# Patient Record
Sex: FEMALE | Race: BLACK OR AFRICAN AMERICAN | NOT HISPANIC OR LATINO | Employment: FULL TIME | URBAN - METROPOLITAN AREA
[De-identification: names, ages, dates, MRNs, and addresses within clinical notes are randomized per-mention and may not be internally consistent; named-entity substitution may affect disease eponyms.]

---

## 2017-05-14 ENCOUNTER — HOSPITAL ENCOUNTER (EMERGENCY)
Facility: HOSPITAL | Age: 27
Discharge: HOME | End: 2017-05-14
Attending: EMERGENCY MEDICINE

## 2017-05-14 VITALS
WEIGHT: 160 LBS | TEMPERATURE: 98 F | SYSTOLIC BLOOD PRESSURE: 128 MMHG | OXYGEN SATURATION: 98 % | BODY MASS INDEX: 29.44 KG/M2 | HEIGHT: 62 IN | HEART RATE: 99 BPM | RESPIRATION RATE: 20 BRPM | DIASTOLIC BLOOD PRESSURE: 70 MMHG

## 2017-05-14 DIAGNOSIS — J45.901 ASTHMA WITH ACUTE EXACERBATION, UNSPECIFIED ASTHMA SEVERITY: Primary | ICD-10-CM

## 2017-05-14 PROCEDURE — 94640 AIRWAY INHALATION TREATMENT: CPT

## 2017-05-14 PROCEDURE — 99283 EMERGENCY DEPT VISIT LOW MDM: CPT | Mod: 25

## 2017-05-14 PROCEDURE — 96372 THER/PROPH/DIAG INJ SC/IM: CPT

## 2017-05-14 PROCEDURE — 25000242 PHARM REV CODE 250 ALT 637 W/ HCPCS: Performed by: EMERGENCY MEDICINE

## 2017-05-14 PROCEDURE — 63600175 PHARM REV CODE 636 W HCPCS: Performed by: EMERGENCY MEDICINE

## 2017-05-14 RX ORDER — IPRATROPIUM BROMIDE AND ALBUTEROL SULFATE 2.5; .5 MG/3ML; MG/3ML
3 SOLUTION RESPIRATORY (INHALATION)
Status: COMPLETED | OUTPATIENT
Start: 2017-05-14 | End: 2017-05-14

## 2017-05-14 RX ORDER — ALBUTEROL SULFATE 90 UG/1
1-2 AEROSOL, METERED RESPIRATORY (INHALATION) EVERY 6 HOURS PRN
Qty: 1 INHALER | Refills: 0 | Status: SHIPPED | OUTPATIENT
Start: 2017-05-14 | End: 2023-04-12

## 2017-05-14 RX ORDER — PREDNISONE 20 MG/1
60 TABLET ORAL
Status: COMPLETED | OUTPATIENT
Start: 2017-05-14 | End: 2017-05-14

## 2017-05-14 RX ORDER — KETOROLAC TROMETHAMINE 30 MG/ML
30 INJECTION, SOLUTION INTRAMUSCULAR; INTRAVENOUS
Status: COMPLETED | OUTPATIENT
Start: 2017-05-14 | End: 2017-05-14

## 2017-05-14 RX ORDER — PREDNISONE 10 MG/1
10 TABLET ORAL DAILY
Qty: 21 TABLET | Refills: 0 | Status: SHIPPED | OUTPATIENT
Start: 2017-05-14 | End: 2017-05-24

## 2017-05-14 RX ADMIN — PREDNISONE 60 MG: 20 TABLET ORAL at 02:05

## 2017-05-14 RX ADMIN — IPRATROPIUM BROMIDE AND ALBUTEROL SULFATE 3 ML: .5; 3 SOLUTION RESPIRATORY (INHALATION) at 02:05

## 2017-05-14 RX ADMIN — KETOROLAC TROMETHAMINE 30 MG: 30 INJECTION, SOLUTION INTRAMUSCULAR at 02:05

## 2017-05-14 NOTE — ED AVS SNAPSHOT
[facility] -   [id number] [place] [id number]               Alisha Cardenas   2017  2:18 AM   ED    Description:  Female : 1990   Department:  [facility]           Your Care was Coordinated By:     Provider Role From To    Gurvinder Chavira MD Attending Provider 17 0220 --      Reason for Visit     Asthma           Diagnoses this Visit        Comments    Asthma with acute exacerbation, unspecified asthma severity    -  Primary       ED Disposition     None           To Do List           Follow-up Information     Follow up with [facility] In 2 days.    Specialty:  Internal Medicine    Contact information:    [address] [facility] [address] [phone]    Additional information:    (off [address]) 1st floor        Follow up with [facility].    Specialty:  Emergency Medicine    Why:  If symptoms worsen    Contact information:    [address] [facility] [id number] [phone]       These Medications        Disp Refills Start End    predniSONE (DELTASONE) 10 MG tablet 21 tablet 0 2017    Take 1 tablet (10 mg total) by mouth once daily. Take 4 tabs x 3 days, then  Take 2 tabs x 3 days, then   Take 1 tab x 3 days. - Oral    Pharmacy: [facility] [id number] [address] & [facility] Ph #: [phone]       albuterol 90 mcg/actuation inhaler 1 Inhaler 0 2017    Inhale 1-2 puffs into the lungs every 6 (six) hours as needed for Wheezing. Rescue - Inhalation    Pharmacy: [facility] [id number] [facility] [address] [facility] & [facility] Ph #: [phone]         [facility]     [facility] Care [facility] -  Assistance  Unless otherwise directed by your provider, please contact [facility], our nurse  care line that is available for 24/7 assistance.     Registered nurses in the [facility] provide: appointment scheduling, clinical advisement, health education, and other advisory services.  Call: [phone] (toll free)               Medications           Message regarding Medications     Verify the changes and/or additions to your medication regime listed below are the same as discussed with your clinician today.  If any of these changes or additions are incorrect, please notify your healthcare provider.        START taking these NEW medications        Refills    predniSONE (DELTASONE) 10 MG tablet 0    Sig: Take 1 tablet (10 mg total) by mouth once daily. Take 4 tabs x 3 days, then  Take 2 tabs x 3 days, then   Take 1 tab x 3 days.    Class: Print    Route: Oral    albuterol 90 mcg/actuation inhaler 0    Sig: Inhale 1-2 puffs into the lungs every 6 (six) hours as needed for Wheezing. Rescue    Class: Print    Route: Inhalation      These medications were administered today        Dose Freq    albuterol-ipratropium 2.5mg-0.5mg/3mL nebulizer solution 3 mL 3 mL Every 5 min    Sig: Take 3 mLs by nebulization every 5 (five) minutes.    Class: Normal    Route: Nebulization    predniSONE tablet 60 mg 60 mg ED 1 Time    Sig: Take 3 tablets (60 mg total) by mouth ED 1 Time.    Class: Normal    Route: Oral    ketorolac injection 30 mg 30 mg ED 1 Time    Sig: Inject 30 mg into the muscle ED 1 Time.    Class: Normal    Route: Intramuscular    Non-formulary Exception Code: Defer to pharmacy           Verify that the below list of medications is an accurate representation of the medications you are currently taking.  If none reported, the list may be blank. If incorrect, please contact your healthcare provider. Carry this list with you in case of emergency.           Current Medications     albuterol 90 mcg/actuation inhaler Inhale 1-2 puffs into the lungs every 6 (six) hours as needed for Wheezing. Rescue     "ibuprofen (ADVIL,MOTRIN) 800 MG tablet Take 1 tablet (800 mg total) by mouth 3 (three) times daily.    norgestimate-ethinyl estradiol (ORTHO TRI-CYCLEN,TRI-SPRINTEC) 0.18/0.215/0.25 mg-35 mcg (28) tablet Take 1 tablet by mouth once daily.    predniSONE (DELTASONE) 10 MG tablet Take 1 tablet (10 mg total) by mouth once daily. Take 4 tabs x 3 days, then  Take 2 tabs x 3 days, then   Take 1 tab x 3 days.    sumatriptan (IMITREX) 100 MG tablet Take 1 tablet (100 mg total) by mouth daily as needed for Migraine.           Clinical Reference Information           Your Vitals Were     BP Pulse Temp Resp Height Weight    135/71 (BP Location: Right arm, Patient Position: Sitting) 101 97.8 °F (36.6 °C) (Oral) 18 5' 2" (1.575 m) 72.6 kg (160 lb)    SpO2 BMI             100% 29.26 kg/m2         Allergies as of 5/14/2017        Reactions    Shellfish Containing Products       Immunizations Administered on Date of Encounter - 5/14/2017     None      ED Micro, Lab, POCT     None      ED Imaging Orders     None        Discharge Instructions         Asthma (Adult)  Asthma is a disease where the medium and  small air passages within the lung go into spasm and restrict the flow of air. Inflammation and swelling of the airways cause further restriction. During an acute asthma attack, these factors cause difficulty breathing, wheezing, cough and chest tightness.    An asthma attack can be triggered by many things. Common triggers include infections such as the common cold, bronchitis, pneumonia. Irritants such as smoke or pollutants in the air, emotional upset, and exercise can also trigger an attack. In many adults with asthma, allergies to dust, mold, pollen and animal dander can cause an asthma attack. Skipping doses of daily asthma medicine can also bring on an asthma attack.  Asthma can be controlled using the proper medicines prescribed by your healthcare provider and avoiding exposure to known triggers including allergens and " "irritants.  Home care  · Take prescribed medicine exactly at the times advised. If you need medicine such as from a hand held inhaler or aerosol breathing machine more than every 4 hours, contact your healthcare provider or seek immediate medical attention. If prescribed an antibiotic or prednisone, take all of the medicine as prescribed, even if you are feeling better after a few days.  · Do not smoke. Avoid being exposed to the smoke of others.  · Some people with asthma have worsening of their symptoms when they take aspirin and non-steroidal or fever-reducing medicines like ibuprofen and naproxen. Talk to your healthcare provider if you think this may apply to you.  Follow-up care  Follow up with your healthcare provider, or as advised. Always bring all of your current medicines to any appointments with your healthcare provider. Also bring a complete list of medications even those not taken for asthma. If you do not already have one, talk to your healthcare provider about developing a personalized "Asthma Action Plan."  A pneumococcal (pneumonia) vaccine and yearly flu shot (every fall) are recommended. Ask your doctor about this.  When to seek medical advice  Call your healthcare provider right away if any of these occur:   · Increased wheezing or shortness of breath  · Need to use your inhalers more often than usual without relief  · Fever of 100.4ºF (38ºC) or higher, or as directed by your healthcare provider  · Coughing up lots of dark-colored or bloody sputum (mucus)  · Chest pain with each breath  · If you use a peak flow meter as part of an Asthma Action Plan, and you are still in the yellow zone (50% to 80%) 15 minutes after using inhaler medicine.  Call 911  Call 911 if any of the following occur  · Trouble walking or talking because of shortness of breath  · If you use a peak flow meter as part of an Asthma Action Plan and you are still in the red zone (less than 50%) 15 minutes after using inhaler " medicine  · Lips or fingernails turning gray or blue  Date Last Reviewed: 12/2/2015  © 8751-5420 [employer]. [address] [place] [address], [place] [address]. All rights reserved. This information is not intended as a substitute for professional medical care. Always follow your healthcare professional's instructions.          Your Scheduled Appointments     Nov 13, 2017  1:00 PM CST   Established Patient Visit with MD [facility] ([facility])    [id number] [place] [id number] [phone]              [facility] Sign-Up     Activating your [facility] account is as easy as 1-2-3!     1) Visit [email or web address], select Sign Up Now, enter this activation code and your date of birth, then select Next.  [email or web address]  Expires: 6/28/2017  3:20 AM      2) Create a username and password to use when you visit [facility] in the future and select a security question in case you lose your password and select Next.    3) Enter your e-mail address and click Sign Up!    Additional Information  If you have questions, please e-mail [email or web address] or call [phone] to talk to our [facility] staff. Remember, [facility] is NOT to be used for urgent needs. For medical emergencies, dial 911.          [facility] complies with applicable Federal civil rights laws and does not discriminate on the basis of race, color, national origin, age, disability, or sex.        Language Assistance Services     ATTENTION: Language assistance services are available, free of charge. Please call [phone].      ATENCIÓN: Si habla español, tiene a morales disposición servicios gratuitos de asistencia lingüística. Llame al [phone].     CHÚ Ý: N?u b?n nói Ti?ng Vi?t, có các d?ch v? h? tr? ngôn ng? mi?n phí dành cho b?n. G?i s? [phone].

## 2017-05-14 NOTE — DISCHARGE INSTRUCTIONS
"  Asthma (Adult)  Asthma is a disease where the medium and  small air passages within the lung go into spasm and restrict the flow of air. Inflammation and swelling of the airways cause further restriction. During an acute asthma attack, these factors cause difficulty breathing, wheezing, cough and chest tightness.    An asthma attack can be triggered by many things. Common triggers include infections such as the common cold, bronchitis, pneumonia. Irritants such as smoke or pollutants in the air, emotional upset, and exercise can also trigger an attack. In many adults with asthma, allergies to dust, mold, pollen and animal dander can cause an asthma attack. Skipping doses of daily asthma medicine can also bring on an asthma attack.  Asthma can be controlled using the proper medicines prescribed by your healthcare provider and avoiding exposure to known triggers including allergens and irritants.  Home care  · Take prescribed medicine exactly at the times advised. If you need medicine such as from a hand held inhaler or aerosol breathing machine more than every 4 hours, contact your healthcare provider or seek immediate medical attention. If prescribed an antibiotic or prednisone, take all of the medicine as prescribed, even if you are feeling better after a few days.  · Do not smoke. Avoid being exposed to the smoke of others.  · Some people with asthma have worsening of their symptoms when they take aspirin and non-steroidal or fever-reducing medicines like ibuprofen and naproxen. Talk to your healthcare provider if you think this may apply to you.  Follow-up care  Follow up with your healthcare provider, or as advised. Always bring all of your current medicines to any appointments with your healthcare provider. Also bring a complete list of medications even those not taken for asthma. If you do not already have one, talk to your healthcare provider about developing a personalized "Asthma Action Plan."  A " pneumococcal (pneumonia) vaccine and yearly flu shot (every fall) are recommended. Ask your doctor about this.  When to seek medical advice  Call your healthcare provider right away if any of these occur:   · Increased wheezing or shortness of breath  · Need to use your inhalers more often than usual without relief  · Fever of 100.4ºF (38ºC) or higher, or as directed by your healthcare provider  · Coughing up lots of dark-colored or bloody sputum (mucus)  · Chest pain with each breath  · If you use a peak flow meter as part of an Asthma Action Plan, and you are still in the yellow zone (50% to 80%) 15 minutes after using inhaler medicine.  Call 911  Call 911 if any of the following occur  · Trouble walking or talking because of shortness of breath  · If you use a peak flow meter as part of an Asthma Action Plan and you are still in the red zone (less than 50%) 15 minutes after using inhaler medicine  · Lips or fingernails turning gray or blue  Date Last Reviewed: 12/2/2015  © 6383-1705 [employer]. [address] [place] [address], [place] [address]. All rights reserved. This information is not intended as a substitute for professional medical care. Always follow your healthcare professional's instructions.

## 2017-05-14 NOTE — ED PROVIDER NOTES
SCRIBE #1 NOTE: I, Cayla Rae, am scribing for, and in the presence of, Gurvinder Chavira MD. I have scribed the entire note.      History      Chief Complaint   Patient presents with    Asthma     cough and chest wall pain       Review of patient's allergies indicates:   Allergen Reactions    Shellfish containing products         HPI   HPI    5/14/2017, 2:26 AM   History obtained from the patient      History of Present Illness: Alisha Cardenas is a 26 y.o. female patient with PMHx of asthma who presents to the Emergency Department for SOB which onset gradually tonight. Symptoms are constant and moderate in severity. No mitigating or exacerbating factors reported. Associated sxs include cough and chest wall pain. Pt reports taking Allegra, Zyrtec, and Flonase for allergies. Patient denies any CP, diaphoresis, palpitations, extremity weakness/numbness, leg swelling, dizziness, N/V, and all other sxs at this time. No further complaints or concerns at this time.     Arrival mode: Personal vehicle     PCP: Primary Doctor No       Past Medical History:  Past Medical History:   Diagnosis Date    Asthma     reports as a child    Astigmatism of left eye     Prior miscarriage with pregnancy in first trimester, antepartum     Prior miscarriage with pregnancy in first trimester, antepartum     Prior miscarriage with pregnancy in second trimester, antepartum        Past Surgical History:  History reviewed. No pertinent surgical history.      Family History:  History reviewed. No pertinent family history.    Social History:  Social History     Social History Main Topics    Smoking status: Never Smoker    Smokeless tobacco: Unknown    Alcohol use Yes      Comment: weekends    Drug use: No    Sexual activity: Yes     Partners: Male     Birth control/ protection: OCP       ROS   Review of Systems   Constitutional: Negative for diaphoresis and fever.   HENT: Negative for sore throat.    Respiratory: Positive for cough and  shortness of breath.    Cardiovascular: Negative for chest pain, palpitations and leg swelling.   Gastrointestinal: Negative for nausea and vomiting.   Genitourinary: Negative for dysuria.   Musculoskeletal: Negative for back pain.        (+) chest wall pain   Skin: Negative for rash.   Neurological: Negative for dizziness, weakness and numbness.   Hematological: Does not bruise/bleed easily.   All other systems reviewed and are negative.      Physical Exam    Initial Vitals   BP Pulse Resp Temp SpO2   05/14/17 0217 05/14/17 0217 05/14/17 0217 05/14/17 0217 05/14/17 0217   135/71 100 20 97.8 °F (36.6 °C) 97 %      Physical Exam  Nursing Notes and Vital Signs Reviewed.  Constitutional: Patient is in no acute distress. Awake and alert. Well-developed and well-nourished.  Head: Atraumatic. Normocephalic.  Eyes: PERRL. EOM intact. Conjunctivae are not pale. No scleral icterus.  ENT: Mucous membranes are moist. Oropharynx is clear and symmetric.    Neck: Supple. Full ROM. No lymphadenopathy.  Cardiovascular: Regular rate. Regular rhythm. No murmurs, rubs, or gallops. Distal pulses are 2+ and symmetric.  Pulmonary/Chest: No respiratory distress. Decreased breath sounds bilaterally with diffuse anterior wall tenderness. No wheezing, rales, or rhonchi.  Abdominal: Soft and non-distended.  There is no tenderness.  No rebound, guarding, or rigidity. Good bowel sounds.  Genitourinary: No CVA tenderness  Musculoskeletal: Moves all extremities. No obvious deformities. No edema. No calf tenderness.  Skin: Warm and dry.  Neurological:  Alert, awake, and appropriate.  Normal speech.  No acute focal neurological deficits are appreciated.  Psychiatric: Normal affect. Good eye contact. Appropriate in content.    ED Course    Procedures  ED Vital Signs:  Vitals:    05/14/17 0217 05/14/17 0226 05/14/17 0230 05/14/17 0234   BP: 135/71      Pulse: 100 88 99 101   Resp: 20 19 18 18   Temp: 97.8 °F (36.6 °C)      TempSrc: Oral      SpO2:  "97% 100% 100% 100%   Weight: 72.6 kg (160 lb)      Height: 5' 2" (1.575 m)       05/14/17 0330   BP: 128/70   Pulse: 99   Resp: 20   Temp: 98.3 °F (36.8 °C)   TempSrc: Oral   SpO2: 98%   Weight:    Height:        Abnormal Lab Results:  Labs Reviewed - No data to display     All Lab Results:      Imaging Results:  Imaging Results     None                  The Emergency Provider reviewed the vital signs and test results, which are outlined above.    ED Discussion     3:20 AM: Reassessed pt at this time.  Pt states her condition has improved at this time. Discussed with pt all pertinent ED information and results. Discussed pt dx of asthma with acute exacerbation and plan of tx. Gave pt all f/u and return to the ED instructions. All questions and concerns were addressed at this time. Pt expresses understanding of information and instructions, and is comfortable with plan to discharge. Pt is stable for discharge.    I discussed with patient and/or family/caretaker that evaluation in the ED does not suggest any emergent or life threatening medical conditions requiring immediate intervention beyond what was provided in the ED, and I believe patient is safe for discharge.  Regardless, an unremarkable evaluation in the ED does not preclude the development or presence of a serious of life threatening condition. As such, patient was instructed to return immediately for any worsening or change in current symptoms.      ED Medication(s):  Medications   albuterol-ipratropium 2.5mg-0.5mg/3mL nebulizer solution 3 mL (3 mLs Nebulization Given 5/14/17 0234)   predniSONE tablet 60 mg (60 mg Oral Given 5/14/17 0242)   ketorolac injection 30 mg (30 mg Intramuscular Given 5/14/17 0241)       Discharge Medication List as of 5/14/2017  3:20 AM      START taking these medications    Details   albuterol 90 mcg/actuation inhaler Inhale 1-2 puffs into the lungs every 6 (six) hours as needed for Wheezing. Rescue, Starting 5/14/2017, Until Mon " 5/14/18, Print      predniSONE (DELTASONE) 10 MG tablet Take 1 tablet (10 mg total) by mouth once daily. Take 4 tabs x 3 days, then  Take 2 tabs x 3 days, then   Take 1 tab x 3 days., Starting 5/14/2017, Until Wed 5/24/17, Print             Follow-up Information     Follow up with [facility] In [facility] days.    Specialty:  Internal Medicine    Contact information:    [address] [facility] [address] [phone]    Additional information:    ([address]) 1st floor        Follow up with [facility]    Specialty:  Emergency Medicine    Why:  If symptoms worsen    Contact information:    [address] [facility] [address] [phone]            Medical Decision Making              Scribe Attestation:   Scribe #1: I performed the above scribed service and the documentation accurately describes the services I performed. I attest to the accuracy of the note.    Attending:   Physician Attestation Statement for Scribe #1: I, Gurvinder Chavira MD, personally performed the services described in this documentation, as scribed by Cayla Rae, in my presence, and it is both accurate and complete.          Clinical Impression       ICD-10-CM ICD-9-CM   1. Asthma with acute exacerbation, unspecified asthma severity J45.901 493.92       Disposition:   Disposition: Discharged  Condition: Stable         Gurvinder Chavira MD  05/14/17 0504

## 2018-11-15 ENCOUNTER — OFFICE VISIT (OUTPATIENT)
Dept: URGENT CARE | Facility: CLINIC | Age: 28
End: 2018-11-15
Payer: COMMERCIAL

## 2018-11-15 VITALS
DIASTOLIC BLOOD PRESSURE: 81 MMHG | BODY MASS INDEX: 29.44 KG/M2 | WEIGHT: 160 LBS | OXYGEN SATURATION: 98 % | HEART RATE: 96 BPM | RESPIRATION RATE: 16 BRPM | TEMPERATURE: 98 F | SYSTOLIC BLOOD PRESSURE: 135 MMHG | HEIGHT: 62 IN

## 2018-11-15 DIAGNOSIS — J01.00 ACUTE NON-RECURRENT MAXILLARY SINUSITIS: Primary | ICD-10-CM

## 2018-11-15 PROCEDURE — 99214 OFFICE O/P EST MOD 30 MIN: CPT | Mod: S$GLB,,, | Performed by: NURSE PRACTITIONER

## 2018-11-15 RX ORDER — PREDNISONE 20 MG/1
20 TABLET ORAL DAILY
Qty: 4 TABLET | Refills: 0 | Status: SHIPPED | OUTPATIENT
Start: 2018-11-15 | End: 2018-11-19

## 2018-11-15 RX ORDER — CEFDINIR 300 MG/1
300 CAPSULE ORAL 2 TIMES DAILY
Qty: 14 CAPSULE | Refills: 0 | Status: SHIPPED | OUTPATIENT
Start: 2018-11-15 | End: 2018-11-22

## 2018-11-15 NOTE — LETTER
November 15, 2018      [facility] [address] [address]  Phone: [phone]  Fax: [phone]       Patient: Alisha Cardenas   YOB: 1990  Date of Visit: 11/15/2018    To Whom It May Concern:    Marcelino Cardenas  was at [facility] on 11/15/2018. She may return to work/school on 11/16/2018 with no restrictions. If you have any questions or concerns, or if I can be of further assistance, please do not hesitate to contact me.    Sincerely,    Vanessa Kearney NP

## 2018-11-15 NOTE — PROGRESS NOTES
"Subjective:       Patient ID: Alisha Cardenas is a 27 y.o. female.    Vitals:  height is 5' 2" (1.575 m) and weight is 72.6 kg (160 lb). Her oral temperature is 98.3 °F (36.8 °C). Her blood pressure is 135/81 and her pulse is 96. Her respiration is 16 and oxygen saturation is 98%.     Chief Complaint: Sinus Problem    Onset from Sunday, tried flonase benadryl      Sinus Problem   This is a new problem. The current episode started in the past 7 days. The problem is unchanged. There has been no fever. Her pain is at a severity of 9/10. The pain is moderate. Associated symptoms include congestion and sinus pressure. Pertinent negatives include no chills, coughing, diaphoresis, ear pain, shortness of breath or sore throat.       Constitution: Negative for chills, sweating, fatigue and fever.   HENT: Positive for congestion and sinus pressure. Negative for ear pain, sinus pain, sore throat and voice change.    Neck: Negative for painful lymph nodes.   Eyes: Negative for eye redness.   Respiratory: Negative for chest tightness, cough, sputum production, bloody sputum, COPD, shortness of breath, stridor, wheezing and asthma.    Gastrointestinal: Negative for nausea and vomiting.   Musculoskeletal: Negative for muscle ache.   Skin: Negative for rash.   Allergic/Immunologic: Negative for seasonal allergies and asthma.   Hematologic/Lymphatic: Negative for swollen lymph nodes.       Objective:      Physical Exam   Constitutional: She is oriented to person, place, and time. She appears well-developed and well-nourished. She is cooperative.  Non-toxic appearance. She does not appear ill. No distress.   HENT:   Head: Normocephalic and atraumatic.   Right Ear: Hearing, external ear and ear canal normal. A middle ear effusion is present.   Left Ear: Hearing, tympanic membrane, external ear and ear canal normal.   Nose: Mucosal edema and rhinorrhea present. No nasal deformity. No epistaxis. Right sinus exhibits maxillary sinus " tenderness. Right sinus exhibits no frontal sinus tenderness. Left sinus exhibits maxillary sinus tenderness. Left sinus exhibits no frontal sinus tenderness.   Mouth/Throat: Uvula is midline and mucous membranes are normal. No trismus in the jaw. Normal dentition. No uvula swelling. Posterior oropharyngeal edema and posterior oropharyngeal erythema present.   Eyes: Conjunctivae and lids are normal. No scleral icterus.   Sclera clear bilat   Neck: Trachea normal, full passive range of motion without pain and phonation normal. Neck supple.   Cardiovascular: Normal rate, regular rhythm, normal heart sounds, intact distal pulses and normal pulses.   Pulmonary/Chest: Effort normal and breath sounds normal. No stridor. No respiratory distress. She has no decreased breath sounds. She has no wheezes. She has no rhonchi.   Abdominal: Soft. Normal appearance and bowel sounds are normal. She exhibits no distension. There is no tenderness.   Musculoskeletal: Normal range of motion. She exhibits no edema or deformity.   Lymphadenopathy:     She has cervical adenopathy.   Neurological: She is alert and oriented to person, place, and time. She exhibits normal muscle tone. Coordination normal.   Skin: Skin is warm, dry and intact. She is not diaphoretic. No pallor.   Psychiatric: She has a normal mood and affect. Her speech is normal and behavior is normal. Judgment and thought content normal. Cognition and memory are normal.   Nursing note and vitals reviewed.      Assessment:       1. Acute non-recurrent maxillary sinusitis        Plan:         Acute non-recurrent maxillary sinusitis  -     cefdinir (OMNICEF) 300 MG capsule; Take 1 capsule (300 mg total) by mouth 2 (two) times daily. for 7 days  Dispense: 14 capsule; Refill: 0  -     predniSONE (DELTASONE) 20 MG tablet; Take 1 tablet (20 mg total) by mouth once daily. for 4 days  Dispense: 4 tablet; Refill: 0      Patient Instructions   Follow up with your doctor in a few  days.  Return to the urgent care or go to the ER if symptoms get worse.        Start taking your antibiotics and take for the full duration.  Start oral steroids today and take as directed.     Zyrtec, Claritin, or Allegra OTC as directed for the next 7 days  Add a decongestant to your antihistamine for congestion- like Zyrtec-D, Claritin D, Allegra D-this may increase your blood pressure.   If high blood pressure, an alternative decongestant is Coricidin HBP   Flonase OTC as directed for the next 7 days  Salt Water Nasal Spray OTC 3x/day for the next 7 days        Follow up with Primary Care or ENT if not improved in 7-10 Days        Sinusitis (Antibiotic Treatment)    The sinuses are air-filled spaces within the bones of the face. They connect to the inside of the nose. Sinusitis is an inflammation of the tissue lining the sinus cavity. Sinus inflammation can occur during a cold. It can also be due to allergies to pollens and other particles in the air. Sinusitis can cause symptoms of sinus congestion and fullness. A sinus infection causes fever, headache and facial pain. There is often green or yellow drainage from the nose or into the back of the throat (post-nasal drip). You have been given antibiotics to treat this condition.    Please return here or go to the Emergency Department for any concerns or worsening of condition.    Home care:  · Take the full course of antibiotics as instructed. Do not stop taking them, even if you feel better.  · Get rest!  · Drink plenty of water, hot tea, and other liquids. This may help thin mucus. It also may promote sinus drainage.  · Heat may help soothe painful areas of the face. Use a towel soaked in hot water. Or,  the shower and direct the hot spray onto your face. Using a vaporizer along with a menthol rub at night may also help.   · An expectorant containing guaifenesin may help thin the mucus and promote drainage from the sinuses.  · If you are a female and on  "birth control pills and take the antibiotics, use additional methods to prevent pregnancy while on the antibiotics and for one cycle after.  · Flonase (fluticasone) is an over the counter nasal spray which may help with your symptoms.  · Zyrtec D, Claritin D, or Allegra D can also help with symptoms of congestion and drainage  · If you have hypertesion, avoid using the "D" which is a decongestant.  · If clear drainage, you can take plain zyrtec, claritin, allegra.  · If congested, you can take pseudoephedrine-you need to ask for this behind the pharmacy counter-do not take if you have high blood pressure. Pheylephrine is on the shelf and is not effective.  · Tylenol or ibuprofen can be used as directed for pain, unless you have allergies. Avoid ibuprofen if medical conditions such as stomach ulcers, kidney or liver disease, or blood thinners  · Afrin is effective if flying in the next few days. Take as directed for the airplane flight upon taking off and landing. Do not continue to use Afrin as it will cause rebound congestion.  · Don't smoke. This can worsen symptoms.  Follow-up care  Follow up with your healthcare provider or our staff if you are not improving within the next week.    When to seek medical advice  Call your healthcare provider if any of these occur:  · Facial pain or headache becoming more severe  · Stiff neck  · Unusual drowsiness or confusion  · Swelling of the forehead or eyelids  · Vision problems, including blurred or double vision  · Fever of 100.4ºF (38ºC) or higher, or as directed by your healthcare provider  · Seizure  · Breathing problems  · Symptoms not resolving within 10 days               "

## 2018-11-15 NOTE — PATIENT INSTRUCTIONS
Follow up with your doctor in a few days.  Return to the urgent care or go to the ER if symptoms get worse.        Start taking your antibiotics and take for the full duration.  Start oral steroids today and take as directed.     Zyrtec, Claritin, or Allegra OTC as directed for the next 7 days  Add a decongestant to your antihistamine for congestion- like Zyrtec-D, Claritin D, Allegra D-this may increase your blood pressure.   If high blood pressure, an alternative decongestant is Coricidin HBP   Flonase OTC as directed for the next 7 days  Salt Water Nasal Spray OTC 3x/day for the next 7 days        Follow up with Primary Care or ENT if not improved in 7-10 Days        Sinusitis (Antibiotic Treatment)    The sinuses are air-filled spaces within the bones of the face. They connect to the inside of the nose. Sinusitis is an inflammation of the tissue lining the sinus cavity. Sinus inflammation can occur during a cold. It can also be due to allergies to pollens and other particles in the air. Sinusitis can cause symptoms of sinus congestion and fullness. A sinus infection causes fever, headache and facial pain. There is often green or yellow drainage from the nose or into the back of the throat (post-nasal drip). You have been given antibiotics to treat this condition.    Please return here or go to the Emergency Department for any concerns or worsening of condition.    Home care:  · Take the full course of antibiotics as instructed. Do not stop taking them, even if you feel better.  · Get rest!  · Drink plenty of water, hot tea, and other liquids. This may help thin mucus. It also may promote sinus drainage.  · Heat may help soothe painful areas of the face. Use a towel soaked in hot water. Or,  the shower and direct the hot spray onto your face. Using a vaporizer along with a menthol rub at night may also help.   · An expectorant containing guaifenesin may help thin the mucus and promote drainage from the  "sinuses.  · If you are a female and on birth control pills and take the antibiotics, use additional methods to prevent pregnancy while on the antibiotics and for one cycle after.  · Flonase (fluticasone) is an over the counter nasal spray which may help with your symptoms.  · Zyrtec D, Claritin D, or Allegra D can also help with symptoms of congestion and drainage  · If you have hypertesion, avoid using the "D" which is a decongestant.  · If clear drainage, you can take plain zyrtec, claritin, allegra.  · If congested, you can take pseudoephedrine-you need to ask for this behind the pharmacy counter-do not take if you have high blood pressure. Pheylephrine is on the shelf and is not effective.  · Tylenol or ibuprofen can be used as directed for pain, unless you have allergies. Avoid ibuprofen if medical conditions such as stomach ulcers, kidney or liver disease, or blood thinners  · Afrin is effective if flying in the next few days. Take as directed for the airplane flight upon taking off and landing. Do not continue to use Afrin as it will cause rebound congestion.  · Don't smoke. This can worsen symptoms.  Follow-up care  Follow up with your healthcare provider or our staff if you are not improving within the next week.    When to seek medical advice  Call your healthcare provider if any of these occur:  · Facial pain or headache becoming more severe  · Stiff neck  · Unusual drowsiness or confusion  · Swelling of the forehead or eyelids  · Vision problems, including blurred or double vision  · Fever of 100.4ºF (38ºC) or higher, or as directed by your healthcare provider  · Seizure  · Breathing problems  · Symptoms not resolving within 10 days          "

## 2020-08-11 ENCOUNTER — OFFICE VISIT (OUTPATIENT)
Dept: URGENT CARE | Facility: CLINIC | Age: 30
End: 2020-08-11
Payer: COMMERCIAL

## 2020-08-11 VITALS
SYSTOLIC BLOOD PRESSURE: 123 MMHG | WEIGHT: 160 LBS | BODY MASS INDEX: 29.44 KG/M2 | DIASTOLIC BLOOD PRESSURE: 70 MMHG | HEART RATE: 119 BPM | HEIGHT: 62 IN | RESPIRATION RATE: 16 BRPM | OXYGEN SATURATION: 98 % | TEMPERATURE: 99 F
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
## 2020-08-11 DIAGNOSIS — M54.9 DORSALGIA, UNSPECIFIED: ICD-10-CM

## 2020-08-11 DIAGNOSIS — M54.50 ACUTE MIDLINE LOW BACK PAIN WITHOUT SCIATICA: Primary | ICD-10-CM

## 2020-08-11 PROCEDURE — 96372 THER/PROPH/DIAG INJ SC/IM: CPT | Mod: S$GLB,,, | Performed by: NURSE PRACTITIONER

## 2020-08-11 PROCEDURE — 72100 XR LUMBAR SPINE 2 OR 3 VIEWS: ICD-10-PCS | Mod: S$GLB,,, | Performed by: RADIOLOGY

## 2020-08-11 PROCEDURE — 96372 PR INJECTION,THERAP/PROPH/DIAG2ST, IM OR SUBCUT: ICD-10-PCS | Mod: S$GLB,,, | Performed by: NURSE PRACTITIONER

## 2020-08-11 PROCEDURE — 99214 OFFICE O/P EST MOD 30 MIN: CPT | Mod: 25,S$GLB,, | Performed by: NURSE PRACTITIONER

## 2020-08-11 PROCEDURE — 99214 PR OFFICE/OUTPT VISIT, EST, LEVL IV, 30-39 MIN: ICD-10-PCS | Mod: 25,S$GLB,, | Performed by: NURSE PRACTITIONER

## 2020-08-11 PROCEDURE — 72100 X-RAY EXAM L-S SPINE 2/3 VWS: CPT | Mod: S$GLB,,, | Performed by: RADIOLOGY

## 2020-08-11 RX ORDER — KETOROLAC TROMETHAMINE 30 MG/ML
30 INJECTION, SOLUTION INTRAMUSCULAR; INTRAVENOUS
Status: COMPLETED | OUTPATIENT
Start: 2020-08-11 | End: 2020-08-11

## 2020-08-11 RX ORDER — CYCLOBENZAPRINE HCL 10 MG
10 TABLET ORAL 2 TIMES DAILY PRN
Qty: 22 TABLET | Refills: 0 | Status: SHIPPED | OUTPATIENT
Start: 2020-08-11 | End: 2021-06-24

## 2020-08-11 RX ORDER — NAPROXEN 500 MG/1
500 TABLET ORAL 2 TIMES DAILY PRN
Qty: 20 TABLET | Refills: 0 | Status: ON HOLD | OUTPATIENT
Start: 2020-08-11 | End: 2022-03-20 | Stop reason: HOSPADM

## 2020-08-11 RX ADMIN — KETOROLAC TROMETHAMINE 30 MG: 30 INJECTION, SOLUTION INTRAMUSCULAR; INTRAVENOUS at 04:08
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
## 2020-08-11 NOTE — LETTER
August 11, 2020      [facility] [address] [address]  Phone: [phone]  Fax: [phone]       Patient: Alisha Cardenas   YOB: 1990  Date of Visit: 08/11/2020    To Whom It May Concern:    Marcelino Cardenas  was at [facility] on 08/11/2020. She may return to work/school on 08/13/20 with no restrictions. If you have any questions or concerns, or if I can be of further assistance, please do not hesitate to contact me.    Sincerely,    Jany Ha MA
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
## 2021-03-18 ENCOUNTER — OFFICE VISIT (OUTPATIENT)
Dept: UROGYNECOLOGY | Facility: CLINIC | Age: 31
End: 2021-03-18
Payer: COMMERCIAL

## 2021-03-18 VITALS
HEIGHT: 62 IN | BODY MASS INDEX: 33.47 KG/M2 | SYSTOLIC BLOOD PRESSURE: 120 MMHG | WEIGHT: 181.88 LBS | DIASTOLIC BLOOD PRESSURE: 70 MMHG

## 2021-03-18 DIAGNOSIS — N85.2 ENLARGED UTERUS: ICD-10-CM

## 2021-03-18 DIAGNOSIS — R39.15 URINARY URGENCY: Primary | ICD-10-CM

## 2021-03-18 PROCEDURE — 88112 CYTOPATH CELL ENHANCE TECH: CPT | Performed by: PATHOLOGY

## 2021-03-18 PROCEDURE — 88112 CYTOPATH CELL ENHANCE TECH: CPT | Mod: 26,,, | Performed by: PATHOLOGY

## 2021-03-18 PROCEDURE — 99205 OFFICE O/P NEW HI 60 MIN: CPT | Mod: S$GLB,,, | Performed by: OBSTETRICS & GYNECOLOGY

## 2021-03-18 PROCEDURE — 99205 PR OFFICE/OUTPT VISIT, NEW, LEVL V, 60-74 MIN: ICD-10-PCS | Mod: S$GLB,,, | Performed by: OBSTETRICS & GYNECOLOGY

## 2021-03-18 PROCEDURE — 99999 PR PBB SHADOW E&M-EST. PATIENT-LVL III: CPT | Mod: PBBFAC,,, | Performed by: OBSTETRICS & GYNECOLOGY

## 2021-03-18 PROCEDURE — 99999 PR PBB SHADOW E&M-EST. PATIENT-LVL III: ICD-10-PCS | Mod: PBBFAC,,, | Performed by: OBSTETRICS & GYNECOLOGY

## 2021-03-18 PROCEDURE — 88112 PR  CYTOPATH, CELL ENHANCE TECH: ICD-10-PCS | Mod: 26,,, | Performed by: PATHOLOGY

## 2021-03-18 RX ORDER — SOLIFENACIN SUCCINATE 5 MG/1
5 TABLET, FILM COATED ORAL DAILY
Qty: 30 TABLET | Refills: 11 | Status: SHIPPED | OUTPATIENT
Start: 2021-03-18 | End: 2021-05-20 | Stop reason: ALTCHOICE

## 2021-03-23 LAB
FINAL PATHOLOGIC DIAGNOSIS: NORMAL
Lab: NORMAL

## 2021-04-08 ENCOUNTER — HOSPITAL ENCOUNTER (OUTPATIENT)
Dept: RADIOLOGY | Facility: OTHER | Age: 31
Discharge: HOME | End: 2021-04-08
Attending: OBSTETRICS & GYNECOLOGY
Payer: COMMERCIAL

## 2021-04-08 DIAGNOSIS — N85.2 ENLARGED UTERUS: ICD-10-CM

## 2021-04-08 PROCEDURE — 76856 US PELVIS COMP WITH TRANSVAG NON-OB (XPD): ICD-10-PCS | Mod: 26,,, | Performed by: RADIOLOGY

## 2021-04-08 PROCEDURE — 76856 US EXAM PELVIC COMPLETE: CPT | Mod: TC

## 2021-04-08 PROCEDURE — 76830 US PELVIS COMP WITH TRANSVAG NON-OB (XPD): ICD-10-PCS | Mod: 26,,, | Performed by: RADIOLOGY

## 2021-04-08 PROCEDURE — 76856 US EXAM PELVIC COMPLETE: CPT | Mod: 26,,, | Performed by: RADIOLOGY

## 2021-04-08 PROCEDURE — 76830 TRANSVAGINAL US NON-OB: CPT | Mod: 26,,, | Performed by: RADIOLOGY

## 2021-04-12 ENCOUNTER — PATIENT MESSAGE (OUTPATIENT)
Dept: UROGYNECOLOGY | Facility: CLINIC | Age: 31
End: 2021-04-12

## 2021-04-12 DIAGNOSIS — N83.202 OVARIAN CYST, LEFT: Primary | ICD-10-CM

## 2021-04-20 ENCOUNTER — TELEPHONE (OUTPATIENT)
Dept: UROGYNECOLOGY | Facility: CLINIC | Age: 31
End: 2021-04-20

## 2021-04-26 ENCOUNTER — PATIENT MESSAGE (OUTPATIENT)
Dept: RESEARCH | Facility: HOSPITAL | Age: 31
End: 2021-04-26

## 2021-05-20 ENCOUNTER — OFFICE VISIT (OUTPATIENT)
Dept: UROGYNECOLOGY | Facility: CLINIC | Age: 31
End: 2021-05-20
Payer: COMMERCIAL

## 2021-05-20 VITALS
DIASTOLIC BLOOD PRESSURE: 66 MMHG | HEART RATE: 93 BPM | BODY MASS INDEX: 32.88 KG/M2 | HEIGHT: 62 IN | SYSTOLIC BLOOD PRESSURE: 127 MMHG | WEIGHT: 178.69 LBS

## 2021-05-20 DIAGNOSIS — R35.0 URINARY FREQUENCY: ICD-10-CM

## 2021-05-20 DIAGNOSIS — R39.15 URINARY URGENCY: Primary | ICD-10-CM

## 2021-05-20 PROCEDURE — 99999 PR PBB SHADOW E&M-EST. PATIENT-LVL III: CPT | Mod: PBBFAC,,, | Performed by: PHYSICIAN ASSISTANT

## 2021-05-20 PROCEDURE — 87147 CULTURE TYPE IMMUNOLOGIC: CPT | Performed by: PHYSICIAN ASSISTANT

## 2021-05-20 PROCEDURE — 99999 PR PBB SHADOW E&M-EST. PATIENT-LVL III: ICD-10-PCS | Mod: PBBFAC,,, | Performed by: PHYSICIAN ASSISTANT

## 2021-05-20 PROCEDURE — 99212 PR OFFICE/OUTPT VISIT, EST, LEVL II, 10-19 MIN: ICD-10-PCS | Mod: S$GLB,,, | Performed by: PHYSICIAN ASSISTANT

## 2021-05-20 PROCEDURE — 99212 OFFICE O/P EST SF 10 MIN: CPT | Mod: S$GLB,,, | Performed by: PHYSICIAN ASSISTANT

## 2021-05-20 PROCEDURE — 87086 URINE CULTURE/COLONY COUNT: CPT | Performed by: PHYSICIAN ASSISTANT

## 2021-05-20 PROCEDURE — 87088 URINE BACTERIA CULTURE: CPT | Performed by: PHYSICIAN ASSISTANT

## 2021-05-20 RX ORDER — TOLTERODINE 2 MG/1
2 CAPSULE, EXTENDED RELEASE ORAL DAILY
Qty: 30 CAPSULE | Refills: 11 | Status: SHIPPED | OUTPATIENT
Start: 2021-05-20 | End: 2022-05-06

## 2021-05-23 LAB — BACTERIA UR CULT: ABNORMAL

## 2021-05-24 ENCOUNTER — PATIENT MESSAGE (OUTPATIENT)
Dept: UROGYNECOLOGY | Facility: CLINIC | Age: 31
End: 2021-05-24

## 2021-05-24 DIAGNOSIS — N30.00 ACUTE CYSTITIS WITHOUT HEMATURIA: Primary | ICD-10-CM

## 2021-05-24 RX ORDER — CEFDINIR 300 MG/1
300 CAPSULE ORAL 2 TIMES DAILY
Qty: 10 CAPSULE | Refills: 0 | Status: SHIPPED | OUTPATIENT
Start: 2021-05-24 | End: 2021-05-29

## 2022-03-15 ENCOUNTER — OFFICE VISIT (OUTPATIENT)
Dept: URGENT CARE | Facility: CLINIC | Age: 32
End: 2022-03-15
Payer: COMMERCIAL

## 2022-03-15 VITALS
SYSTOLIC BLOOD PRESSURE: 101 MMHG | WEIGHT: 178 LBS | OXYGEN SATURATION: 100 % | DIASTOLIC BLOOD PRESSURE: 55 MMHG | HEIGHT: 62 IN | TEMPERATURE: 98 F | HEART RATE: 99 BPM | BODY MASS INDEX: 32.76 KG/M2 | RESPIRATION RATE: 18 BRPM

## 2022-03-15 DIAGNOSIS — R05.9 COUGH: ICD-10-CM

## 2022-03-15 DIAGNOSIS — R52 GENERALIZED BODY ACHES: ICD-10-CM

## 2022-03-15 DIAGNOSIS — B34.9 ACUTE VIRAL SYNDROME: Primary | ICD-10-CM

## 2022-03-15 DIAGNOSIS — J02.9 SORE THROAT: ICD-10-CM

## 2022-03-15 LAB
CTP QC/QA: YES
CTP QC/QA: YES
POC MOLECULAR INFLUENZA A AGN: NEGATIVE
POC MOLECULAR INFLUENZA B AGN: NEGATIVE
SARS-COV-2 RDRP RESP QL NAA+PROBE: NEGATIVE

## 2022-03-15 PROCEDURE — 99213 PR OFFICE/OUTPT VISIT, EST, LEVL III, 20-29 MIN: ICD-10-PCS | Mod: S$GLB,,, | Performed by: NURSE PRACTITIONER

## 2022-03-15 PROCEDURE — 99213 OFFICE O/P EST LOW 20 MIN: CPT | Mod: S$GLB,,, | Performed by: NURSE PRACTITIONER

## 2022-03-15 PROCEDURE — 87502 POCT INFLUENZA A/B MOLECULAR: ICD-10-PCS | Mod: QW,S$GLB,, | Performed by: NURSE PRACTITIONER

## 2022-03-15 PROCEDURE — U0002: ICD-10-PCS | Mod: QW,S$GLB,, | Performed by: NURSE PRACTITIONER

## 2022-03-15 PROCEDURE — U0002 COVID-19 LAB TEST NON-CDC: HCPCS | Mod: QW,S$GLB,, | Performed by: NURSE PRACTITIONER

## 2022-03-15 PROCEDURE — 87502 INFLUENZA DNA AMP PROBE: CPT | Mod: QW,S$GLB,, | Performed by: NURSE PRACTITIONER

## 2022-03-15 NOTE — PATIENT INSTRUCTIONS
Use warm salt water gargles to ease your throat pain. Warm salt water gargles as needed for sore throat-  1/2 tsp salt to 1 cup warm water, gargle as desired. Hot tea helps soothe.     Over the counter you can use Tylenol (acetominophen) or Ibuprofen for your minor aches and pains as long as you have no contraindications.    Good nutrition. Lots of rest. Plenty of fluids        You must understand that you've received an Urgent Care treatment only and that you may be released before all your medical problems are known or treated. You, the patient, will arrange for follow up care as instructed.  Follow up with your PCP or specialty clinic as directed in the next 1-2 weeks if not improved or as needed.  You can call [phone] to schedule an appointment with the appropriate provider.  If your condition worsens we recommend that you receive another evaluation at the emergency room immediately or contact your primary medical clinics after hours call service to discuss your concerns.  Please return here or go to the Emergency Department for any concerns or worsening of condition.

## 2022-03-15 NOTE — PROGRESS NOTES
"Subjective:       Patient ID: Alisha Cardenas is a 31 y.o. female.    Vitals:  height is 5' 2" (1.575 m) and weight is 80.7 kg (178 lb). Her temperature is 98.1 °F (36.7 °C). Her blood pressure is 101/55 (abnormal) and her pulse is 99. Her respiration is 18 and oxygen saturation is 100%.     Chief Complaint: Sore Throat    Sore Throat   This is a new problem. The current episode started in the past 7 days (x2 days). The problem has been unchanged. There has been no fever. The pain is at a severity of 10/10. The pain is severe. Associated symptoms include ear pain, headaches, swollen glands and trouble swallowing. Associated symptoms comments: Body aches. She has tried acetaminophen for the symptoms. The treatment provided no relief.       Constitution: Positive for fatigue.   HENT: Positive for ear pain, sore throat and trouble swallowing.    Neurological: Positive for headaches.       Objective:      Physical Exam   Constitutional: She is oriented to person, place, and time. She appears well-developed. She is cooperative.  Non-toxic appearance. She does not appear ill. No distress.   HENT:   Head: Normocephalic and atraumatic.   Ears:   Right Ear: Hearing, tympanic membrane, external ear and ear canal normal.   Left Ear: Hearing, tympanic membrane, external ear and ear canal normal.   Nose: Nose normal. No mucosal edema, rhinorrhea or nasal deformity. No epistaxis. Right sinus exhibits no maxillary sinus tenderness and no frontal sinus tenderness. Left sinus exhibits no maxillary sinus tenderness and no frontal sinus tenderness.   Mouth/Throat: Uvula is midline, oropharynx is clear and moist and mucous membranes are normal. No trismus in the jaw. Normal dentition. No uvula swelling. Cobblestoning present. No posterior oropharyngeal erythema.   Eyes: Conjunctivae and lids are normal. Right eye exhibits no discharge. Left eye exhibits no discharge. No scleral icterus.   Neck: Trachea normal and phonation normal. Neck " supple.   Cardiovascular: Normal rate, regular rhythm, normal heart sounds and normal pulses.   Pulmonary/Chest: Effort normal and breath sounds normal. No respiratory distress.   Lungs clear bilaterally    Comments: Lungs clear bilaterally    Abdominal: Normal appearance and bowel sounds are normal. She exhibits no distension and no mass. Soft. There is no abdominal tenderness.   Musculoskeletal: Normal range of motion.         General: No deformity. Normal range of motion.   Neurological: She is alert and oriented to person, place, and time. She exhibits normal muscle tone. Coordination normal.   Skin: Skin is warm, dry, intact, not diaphoretic and not pale.   Psychiatric: Her speech is normal and behavior is normal. Judgment and thought content normal.   Nursing note and vitals reviewed.        Results for orders placed or performed in visit on 03/15/22   POCT Influenza A/B MOLECULAR   Result Value Ref Range    POC Molecular Influenza A Ag Negative Negative, Not Reported    POC Molecular Influenza B Ag Negative Negative, Not Reported     Acceptable Yes    POCT COVID-19 Rapid Screening   Result Value Ref Range    POC Rapid COVID Negative Negative     Acceptable Yes      Assessment:       1. Cough    2. Sore throat    3. Generalized body aches    4. Acute viral syndrome          Plan:         Cough  -     POCT COVID-19 Rapid Screening    Sore throat  -     Cancel: POCT COVID-19 Rapid Screening  -     (Magic mouthwash) 1:1:1 diphenhydramine(Benadryl) 12.5mg/5ml liq, aluminum & magnesium hydroxide-simethicone (Maalox), LIDOcaine viscous 2%; Swish and spit 10 mLs every 4 (four) hours as needed (sore throat). for mouth sores  Dispense: 360 mL; Refill: 0    Generalized body aches  -     POCT Influenza A/B MOLECULAR    Acute viral syndrome      Patient Instructions   Use warm salt water gargles to ease your throat pain. Warm salt water gargles as needed for sore throat-  1/2 tsp salt to 1  cup warm water, gargle as desired. Hot tea helps soothe.     Over the counter you can use Tylenol (acetominophen) or Ibuprofen for your minor aches and pains as long as you have no contraindications.    Good nutrition. Lots of rest. Plenty of fluids        You must understand that you've received an Urgent Care treatment only and that you may be released before all your medical problems are known or treated. You, the patient, will arrange for follow up care as instructed.  Follow up with your PCP or specialty clinic as directed in the next 1-2 weeks if not improved or as needed.  You can call [phone] to schedule an appointment with the appropriate provider.  If your condition worsens we recommend that you receive another evaluation at the emergency room immediately or contact your primary medical clinics after hours call service to discuss your concerns.  Please return here or go to the Emergency Department for any concerns or worsening of condition.

## 2022-03-19 ENCOUNTER — HOSPITAL ENCOUNTER (OUTPATIENT)
Facility: OTHER | Age: 32
Discharge: HOME | End: 2022-03-20
Attending: EMERGENCY MEDICINE | Admitting: INTERNAL MEDICINE
Payer: COMMERCIAL

## 2022-03-19 DIAGNOSIS — D64.9 ANEMIA: ICD-10-CM

## 2022-03-19 DIAGNOSIS — D64.9 ANEMIA, UNSPECIFIED TYPE: Primary | ICD-10-CM

## 2022-03-19 DIAGNOSIS — D75.839 THROMBOCYTOSIS: ICD-10-CM

## 2022-03-19 DIAGNOSIS — J06.9 VIRAL URI: ICD-10-CM

## 2022-03-19 PROBLEM — B34.9 VIRAL SYNDROME: Status: ACTIVE | Noted: 2022-03-19

## 2022-03-19 LAB
ABO + RH BLD: NORMAL
ALBUMIN SERPL BCP-MCNC: 3.5 G/DL (ref 3.5–5.2)
ALP SERPL-CCNC: 35 U/L (ref 55–135)
ALT SERPL W/O P-5'-P-CCNC: 10 U/L (ref 10–44)
ANION GAP SERPL CALC-SCNC: 15 MMOL/L (ref 8–16)
ANISOCYTOSIS BLD QL SMEAR: ABNORMAL
AST SERPL-CCNC: 17 U/L (ref 10–40)
BASOPHILS # BLD AUTO: 0.06 K/UL (ref 0–0.2)
BASOPHILS # BLD AUTO: 0.09 K/UL (ref 0–0.2)
BASOPHILS NFR BLD: 0.4 % (ref 0–1.9)
BASOPHILS NFR BLD: 0.7 % (ref 0–1.9)
BILIRUB SERPL-MCNC: 0.2 MG/DL (ref 0.1–1)
BLD GP AB SCN CELLS X3 SERPL QL: NORMAL
BUN SERPL-MCNC: 9 MG/DL (ref 6–20)
CALCIUM SERPL-MCNC: 9.3 MG/DL (ref 8.7–10.5)
CHLORIDE SERPL-SCNC: 104 MMOL/L (ref 95–110)
CO2 SERPL-SCNC: 20 MMOL/L (ref 23–29)
CREAT SERPL-MCNC: 0.9 MG/DL (ref 0.5–1.4)
CTP QC/QA: YES
DACRYOCYTES BLD QL SMEAR: ABNORMAL
DIFFERENTIAL METHOD: ABNORMAL
DIFFERENTIAL METHOD: ABNORMAL
EOSINOPHIL # BLD AUTO: 0.3 K/UL (ref 0–0.5)
EOSINOPHIL # BLD AUTO: 0.3 K/UL (ref 0–0.5)
EOSINOPHIL NFR BLD: 2 % (ref 0–8)
EOSINOPHIL NFR BLD: 2.1 % (ref 0–8)
ERYTHROCYTE [DISTWIDTH] IN BLOOD BY AUTOMATED COUNT: 35.8 % (ref 11.5–14.5)
ERYTHROCYTE [DISTWIDTH] IN BLOOD BY AUTOMATED COUNT: 35.9 % (ref 11.5–14.5)
EST. GFR  (AFRICAN AMERICAN): >60 ML/MIN/1.73 M^2
EST. GFR  (NON AFRICAN AMERICAN): >60 ML/MIN/1.73 M^2
GLUCOSE SERPL-MCNC: 139 MG/DL (ref 70–110)
GROUP A STREP, MOLECULAR: NEGATIVE
HCT VFR BLD AUTO: 17.9 % (ref 37–48.5)
HCT VFR BLD AUTO: 18.6 % (ref 37–48.5)
HCV AB SERPL QL IA: NEGATIVE
HGB BLD-MCNC: 4.4 G/DL (ref 12–16)
HGB BLD-MCNC: 4.6 G/DL (ref 12–16)
HIV 1+2 AB+HIV1 P24 AG SERPL QL IA: NEGATIVE
HYPOCHROMIA BLD QL SMEAR: ABNORMAL
IMM GRANULOCYTES # BLD AUTO: 0.08 K/UL (ref 0–0.04)
IMM GRANULOCYTES # BLD AUTO: 0.08 K/UL (ref 0–0.04)
IMM GRANULOCYTES NFR BLD AUTO: 0.6 % (ref 0–0.5)
IMM GRANULOCYTES NFR BLD AUTO: 0.6 % (ref 0–0.5)
LDH SERPL L TO P-CCNC: 307 U/L (ref 110–260)
LYMPHOCYTES # BLD AUTO: 3.2 K/UL (ref 1–4.8)
LYMPHOCYTES # BLD AUTO: 3.3 K/UL (ref 1–4.8)
LYMPHOCYTES NFR BLD: 23.1 % (ref 18–48)
LYMPHOCYTES NFR BLD: 24.7 % (ref 18–48)
MCH RBC QN AUTO: 16.1 PG (ref 27–31)
MCH RBC QN AUTO: 16.4 PG (ref 27–31)
MCHC RBC AUTO-ENTMCNC: 24.6 G/DL (ref 32–36)
MCHC RBC AUTO-ENTMCNC: 24.7 G/DL (ref 32–36)
MCV RBC AUTO: 66 FL (ref 82–98)
MCV RBC AUTO: 66 FL (ref 82–98)
MONOCYTES # BLD AUTO: 1 K/UL (ref 0.3–1)
MONOCYTES # BLD AUTO: 1 K/UL (ref 0.3–1)
MONOCYTES NFR BLD: 6.8 % (ref 4–15)
MONOCYTES NFR BLD: 7.3 % (ref 4–15)
NEUTROPHILS # BLD AUTO: 8.7 K/UL (ref 1.8–7.7)
NEUTROPHILS # BLD AUTO: 9.3 K/UL (ref 1.8–7.7)
NEUTROPHILS NFR BLD: 64.6 % (ref 38–73)
NEUTROPHILS NFR BLD: 67.1 % (ref 38–73)
NRBC BLD-RTO: 3 /100 WBC
NRBC BLD-RTO: 3 /100 WBC
OVALOCYTES BLD QL SMEAR: ABNORMAL
PLATELET # BLD AUTO: 1522 K/UL (ref 150–450)
PLATELET # BLD AUTO: 1560 K/UL (ref 150–450)
PLATELET BLD QL SMEAR: ABNORMAL
PMV BLD AUTO: 8.9 FL (ref 9.2–12.9)
PMV BLD AUTO: 9.1 FL (ref 9.2–12.9)
POLYCHROMASIA BLD QL SMEAR: ABNORMAL
POTASSIUM SERPL-SCNC: 3.7 MMOL/L (ref 3.5–5.1)
PROT SERPL-MCNC: 7.9 G/DL (ref 6–8.4)
RBC # BLD AUTO: 2.73 M/UL (ref 4–5.4)
RBC # BLD AUTO: 2.81 M/UL (ref 4–5.4)
RETICS/RBC NFR AUTO: 0.9 % (ref 0.5–2.5)
SARS-COV-2 RDRP RESP QL NAA+PROBE: NEGATIVE
SODIUM SERPL-SCNC: 139 MMOL/L (ref 136–145)
WBC # BLD AUTO: 13.48 K/UL (ref 3.9–12.7)
WBC # BLD AUTO: 13.88 K/UL (ref 3.9–12.7)

## 2022-03-19 PROCEDURE — G0378 HOSPITAL OBSERVATION PER HR: HCPCS

## 2022-03-19 PROCEDURE — 86920 COMPATIBILITY TEST SPIN: CPT | Performed by: PHYSICIAN ASSISTANT

## 2022-03-19 PROCEDURE — 80053 COMPREHEN METABOLIC PANEL: CPT | Performed by: EMERGENCY MEDICINE

## 2022-03-19 PROCEDURE — 25000003 PHARM REV CODE 250: Performed by: PHYSICIAN ASSISTANT

## 2022-03-19 PROCEDURE — 85045 AUTOMATED RETICULOCYTE COUNT: CPT | Performed by: EMERGENCY MEDICINE

## 2022-03-19 PROCEDURE — 84466 ASSAY OF TRANSFERRIN: CPT | Performed by: EMERGENCY MEDICINE

## 2022-03-19 PROCEDURE — 83615 LACTATE (LD) (LDH) ENZYME: CPT | Performed by: EMERGENCY MEDICINE

## 2022-03-19 PROCEDURE — 86803 HEPATITIS C AB TEST: CPT | Performed by: EMERGENCY MEDICINE

## 2022-03-19 PROCEDURE — 96361 HYDRATE IV INFUSION ADD-ON: CPT

## 2022-03-19 PROCEDURE — U0002 COVID-19 LAB TEST NON-CDC: HCPCS | Performed by: EMERGENCY MEDICINE

## 2022-03-19 PROCEDURE — 25000003 PHARM REV CODE 250: Performed by: EMERGENCY MEDICINE

## 2022-03-19 PROCEDURE — 86920 COMPATIBILITY TEST SPIN: CPT | Performed by: EMERGENCY MEDICINE

## 2022-03-19 PROCEDURE — 87651 STREP A DNA AMP PROBE: CPT | Performed by: EMERGENCY MEDICINE

## 2022-03-19 PROCEDURE — 85660 RBC SICKLE CELL TEST: CPT | Performed by: EMERGENCY MEDICINE

## 2022-03-19 PROCEDURE — 85025 COMPLETE CBC W/AUTO DIFF WBC: CPT | Performed by: EMERGENCY MEDICINE

## 2022-03-19 PROCEDURE — 82746 ASSAY OF FOLIC ACID SERUM: CPT | Performed by: EMERGENCY MEDICINE

## 2022-03-19 PROCEDURE — A4216 STERILE WATER/SALINE, 10 ML: HCPCS | Performed by: PHYSICIAN ASSISTANT

## 2022-03-19 PROCEDURE — 82728 ASSAY OF FERRITIN: CPT | Performed by: EMERGENCY MEDICINE

## 2022-03-19 PROCEDURE — 96375 TX/PRO/DX INJ NEW DRUG ADDON: CPT

## 2022-03-19 PROCEDURE — 99291 CRITICAL CARE FIRST HOUR: CPT | Mod: 25

## 2022-03-19 PROCEDURE — 86850 RBC ANTIBODY SCREEN: CPT | Performed by: EMERGENCY MEDICINE

## 2022-03-19 PROCEDURE — 99220 PR INITIAL OBSERVATION CARE,LEVL III: ICD-10-PCS | Mod: ,,, | Performed by: PHYSICIAN ASSISTANT

## 2022-03-19 PROCEDURE — 63600175 PHARM REV CODE 636 W HCPCS: Performed by: EMERGENCY MEDICINE

## 2022-03-19 PROCEDURE — 36415 COLL VENOUS BLD VENIPUNCTURE: CPT | Performed by: EMERGENCY MEDICINE

## 2022-03-19 PROCEDURE — 82607 VITAMIN B-12: CPT | Performed by: EMERGENCY MEDICINE

## 2022-03-19 PROCEDURE — 87389 HIV-1 AG W/HIV-1&-2 AB AG IA: CPT | Performed by: EMERGENCY MEDICINE

## 2022-03-19 PROCEDURE — 99220 PR INITIAL OBSERVATION CARE,LEVL III: CPT | Mod: ,,, | Performed by: PHYSICIAN ASSISTANT

## 2022-03-19 PROCEDURE — 96374 THER/PROPH/DIAG INJ IV PUSH: CPT

## 2022-03-19 RX ORDER — SODIUM CHLORIDE 0.9 % (FLUSH) 0.9 %
10 SYRINGE (ML) INJECTION
Status: CANCELLED | OUTPATIENT
Start: 2022-03-19

## 2022-03-19 RX ORDER — TALC
6 POWDER (GRAM) TOPICAL NIGHTLY PRN
Status: CANCELLED | OUTPATIENT
Start: 2022-03-19

## 2022-03-19 RX ORDER — HYDROCODONE BITARTRATE AND ACETAMINOPHEN 500; 5 MG/1; MG/1
TABLET ORAL
Status: DISCONTINUED | OUTPATIENT
Start: 2022-03-19 | End: 2022-03-20 | Stop reason: HOSPADM

## 2022-03-19 RX ORDER — SODIUM CHLORIDE 0.9 % (FLUSH) 0.9 %
10 SYRINGE (ML) INJECTION EVERY 8 HOURS
Status: DISCONTINUED | OUTPATIENT
Start: 2022-03-19 | End: 2022-03-20 | Stop reason: HOSPADM

## 2022-03-19 RX ORDER — AMOXICILLIN 250 MG
1 CAPSULE ORAL 2 TIMES DAILY PRN
Status: DISCONTINUED | OUTPATIENT
Start: 2022-03-19 | End: 2022-03-20 | Stop reason: HOSPADM

## 2022-03-19 RX ORDER — ONDANSETRON 2 MG/ML
4 INJECTION INTRAMUSCULAR; INTRAVENOUS
Status: COMPLETED | OUTPATIENT
Start: 2022-03-19 | End: 2022-03-19

## 2022-03-19 RX ORDER — ACETAMINOPHEN 325 MG/1
650 TABLET ORAL EVERY 6 HOURS PRN
Status: DISCONTINUED | OUTPATIENT
Start: 2022-03-19 | End: 2022-03-20 | Stop reason: HOSPADM

## 2022-03-19 RX ORDER — KETOROLAC TROMETHAMINE 30 MG/ML
15 INJECTION, SOLUTION INTRAMUSCULAR; INTRAVENOUS
Status: COMPLETED | OUTPATIENT
Start: 2022-03-19 | End: 2022-03-19

## 2022-03-19 RX ORDER — NALOXONE HCL 0.4 MG/ML
0.02 VIAL (ML) INJECTION
Status: DISCONTINUED | OUTPATIENT
Start: 2022-03-19 | End: 2022-03-20 | Stop reason: HOSPADM

## 2022-03-19 RX ORDER — TALC
6 POWDER (GRAM) TOPICAL NIGHTLY PRN
Status: DISCONTINUED | OUTPATIENT
Start: 2022-03-19 | End: 2022-03-20 | Stop reason: HOSPADM

## 2022-03-19 RX ADMIN — SODIUM CHLORIDE 1000 ML: 0.9 INJECTION, SOLUTION INTRAVENOUS at 08:03

## 2022-03-19 RX ADMIN — ONDANSETRON 4 MG: 2 INJECTION INTRAMUSCULAR; INTRAVENOUS at 08:03

## 2022-03-19 RX ADMIN — KETOROLAC TROMETHAMINE 15 MG: 30 INJECTION, SOLUTION INTRAMUSCULAR at 08:03

## 2022-03-19 RX ADMIN — Medication 10 ML: at 10:03

## 2022-03-20 VITALS
RESPIRATION RATE: 18 BRPM | TEMPERATURE: 98 F | HEIGHT: 62 IN | WEIGHT: 165.13 LBS | DIASTOLIC BLOOD PRESSURE: 74 MMHG | HEART RATE: 83 BPM | BODY MASS INDEX: 30.39 KG/M2 | OXYGEN SATURATION: 98 % | SYSTOLIC BLOOD PRESSURE: 124 MMHG

## 2022-03-20 LAB
ANION GAP SERPL CALC-SCNC: 9 MMOL/L (ref 8–16)
ANISOCYTOSIS BLD QL SMEAR: ABNORMAL
ANISOCYTOSIS BLD QL SMEAR: ABNORMAL
BASOPHILS # BLD AUTO: 0.08 K/UL (ref 0–0.2)
BASOPHILS # BLD AUTO: 0.09 K/UL (ref 0–0.2)
BASOPHILS NFR BLD: 0.6 % (ref 0–1.9)
BASOPHILS NFR BLD: 0.8 % (ref 0–1.9)
BLD PROD TYP BPU: NORMAL
BLOOD UNIT EXPIRATION DATE: NORMAL
BLOOD UNIT TYPE CODE: 5100
BLOOD UNIT TYPE CODE: 7300
BLOOD UNIT TYPE: NORMAL
BUN SERPL-MCNC: 8 MG/DL (ref 6–20)
CALCIUM SERPL-MCNC: 8.6 MG/DL (ref 8.7–10.5)
CHLORIDE SERPL-SCNC: 109 MMOL/L (ref 95–110)
CO2 SERPL-SCNC: 23 MMOL/L (ref 23–29)
CODING SYSTEM: NORMAL
CREAT SERPL-MCNC: 0.7 MG/DL (ref 0.5–1.4)
DACRYOCYTES BLD QL SMEAR: ABNORMAL
DACRYOCYTES BLD QL SMEAR: ABNORMAL
DIFFERENTIAL METHOD: ABNORMAL
DIFFERENTIAL METHOD: ABNORMAL
DISPENSE STATUS: NORMAL
EOSINOPHIL # BLD AUTO: 0.3 K/UL (ref 0–0.5)
EOSINOPHIL # BLD AUTO: 0.3 K/UL (ref 0–0.5)
EOSINOPHIL NFR BLD: 2.4 % (ref 0–8)
EOSINOPHIL NFR BLD: 2.6 % (ref 0–8)
ERYTHROCYTE [DISTWIDTH] IN BLOOD BY AUTOMATED COUNT: 34.5 % (ref 11.5–14.5)
ERYTHROCYTE [DISTWIDTH] IN BLOOD BY AUTOMATED COUNT: ABNORMAL % (ref 11.5–14.5)
EST. GFR  (AFRICAN AMERICAN): >60 ML/MIN/1.73 M^2
EST. GFR  (NON AFRICAN AMERICAN): >60 ML/MIN/1.73 M^2
FERRITIN SERPL-MCNC: 3 NG/ML (ref 20–300)
FOLATE SERPL-MCNC: 15.5 NG/ML (ref 4–24)
GLUCOSE SERPL-MCNC: 92 MG/DL (ref 70–110)
HCT VFR BLD AUTO: 20.9 % (ref 37–48.5)
HCT VFR BLD AUTO: 23.5 % (ref 37–48.5)
HGB BLD-MCNC: 5.6 G/DL (ref 12–16)
HGB BLD-MCNC: 6.8 G/DL (ref 12–16)
HGB S BLD QL SOLY: POSITIVE
HYPOCHROMIA BLD QL SMEAR: ABNORMAL
HYPOCHROMIA BLD QL SMEAR: ABNORMAL
IMM GRANULOCYTES # BLD AUTO: 0.07 K/UL (ref 0–0.04)
IMM GRANULOCYTES # BLD AUTO: 0.07 K/UL (ref 0–0.04)
IMM GRANULOCYTES NFR BLD AUTO: 0.6 % (ref 0–0.5)
IMM GRANULOCYTES NFR BLD AUTO: 0.6 % (ref 0–0.5)
IRON SERPL-MCNC: 18 UG/DL (ref 30–160)
LYMPHOCYTES # BLD AUTO: 2.5 K/UL (ref 1–4.8)
LYMPHOCYTES # BLD AUTO: 3.7 K/UL (ref 1–4.8)
LYMPHOCYTES NFR BLD: 21.7 % (ref 18–48)
LYMPHOCYTES NFR BLD: 30.1 % (ref 18–48)
MAGNESIUM SERPL-MCNC: 2.2 MG/DL (ref 1.6–2.6)
MCH RBC QN AUTO: 19 PG (ref 27–31)
MCH RBC QN AUTO: 20.8 PG (ref 27–31)
MCHC RBC AUTO-ENTMCNC: 26.8 G/DL (ref 32–36)
MCHC RBC AUTO-ENTMCNC: 28.9 G/DL (ref 32–36)
MCV RBC AUTO: 71 FL (ref 82–98)
MCV RBC AUTO: 72 FL (ref 82–98)
MONOCYTES # BLD AUTO: 0.7 K/UL (ref 0.3–1)
MONOCYTES # BLD AUTO: 0.8 K/UL (ref 0.3–1)
MONOCYTES NFR BLD: 5.9 % (ref 4–15)
MONOCYTES NFR BLD: 6.5 % (ref 4–15)
NEUTROPHILS # BLD AUTO: 7.4 K/UL (ref 1.8–7.7)
NEUTROPHILS # BLD AUTO: 7.8 K/UL (ref 1.8–7.7)
NEUTROPHILS NFR BLD: 59.8 % (ref 38–73)
NEUTROPHILS NFR BLD: 68.4 % (ref 38–73)
NRBC BLD-RTO: 2 /100 WBC
NRBC BLD-RTO: 2 /100 WBC
NUM UNITS TRANS PACKED RBC: NORMAL
OVALOCYTES BLD QL SMEAR: ABNORMAL
OVALOCYTES BLD QL SMEAR: ABNORMAL
PLATELET # BLD AUTO: 1179 K/UL (ref 150–450)
PLATELET # BLD AUTO: 1221 K/UL (ref 150–450)
PLATELET BLD QL SMEAR: ABNORMAL
PLATELET BLD QL SMEAR: ABNORMAL
PMV BLD AUTO: 8.3 FL (ref 9.2–12.9)
PMV BLD AUTO: 8.6 FL (ref 9.2–12.9)
POLYCHROMASIA BLD QL SMEAR: ABNORMAL
POLYCHROMASIA BLD QL SMEAR: ABNORMAL
POTASSIUM SERPL-SCNC: 4 MMOL/L (ref 3.5–5.1)
RBC # BLD AUTO: 2.95 M/UL (ref 4–5.4)
RBC # BLD AUTO: 3.27 M/UL (ref 4–5.4)
SATURATED IRON: 3 % (ref 20–50)
SODIUM SERPL-SCNC: 141 MMOL/L (ref 136–145)
TOTAL IRON BINDING CAPACITY: 653 UG/DL (ref 250–450)
TRANS ERYTHROCYTES VOL PATIENT: NORMAL ML
TRANSFERRIN SERPL-MCNC: 441 MG/DL (ref 200–375)
VIT B12 SERPL-MCNC: 754 PG/ML (ref 210–950)
WBC # BLD AUTO: 11.35 K/UL (ref 3.9–12.7)
WBC # BLD AUTO: 12.39 K/UL (ref 3.9–12.7)

## 2022-03-20 PROCEDURE — 85025 COMPLETE CBC W/AUTO DIFF WBC: CPT | Performed by: INTERNAL MEDICINE

## 2022-03-20 PROCEDURE — 36415 COLL VENOUS BLD VENIPUNCTURE: CPT | Performed by: PHYSICIAN ASSISTANT

## 2022-03-20 PROCEDURE — 83735 ASSAY OF MAGNESIUM: CPT | Performed by: PHYSICIAN ASSISTANT

## 2022-03-20 PROCEDURE — 87632 RESP VIRUS 6-11 TARGETS: CPT | Performed by: PHYSICIAN ASSISTANT

## 2022-03-20 PROCEDURE — 80048 BASIC METABOLIC PNL TOTAL CA: CPT | Performed by: PHYSICIAN ASSISTANT

## 2022-03-20 PROCEDURE — P9021 RED BLOOD CELLS UNIT: HCPCS | Performed by: EMERGENCY MEDICINE

## 2022-03-20 PROCEDURE — 25000242 PHARM REV CODE 250 ALT 637 W/ HCPCS: Performed by: INTERNAL MEDICINE

## 2022-03-20 PROCEDURE — 36430 TRANSFUSION BLD/BLD COMPNT: CPT

## 2022-03-20 PROCEDURE — 99217 PR OBSERVATION CARE DISCHARGE: ICD-10-PCS | Mod: ,,, | Performed by: INTERNAL MEDICINE

## 2022-03-20 PROCEDURE — 99217 PR OBSERVATION CARE DISCHARGE: CPT | Mod: ,,, | Performed by: INTERNAL MEDICINE

## 2022-03-20 PROCEDURE — G0378 HOSPITAL OBSERVATION PER HR: HCPCS

## 2022-03-20 PROCEDURE — 36415 COLL VENOUS BLD VENIPUNCTURE: CPT | Performed by: INTERNAL MEDICINE

## 2022-03-20 PROCEDURE — 85025 COMPLETE CBC W/AUTO DIFF WBC: CPT | Mod: 91 | Performed by: PHYSICIAN ASSISTANT

## 2022-03-20 PROCEDURE — 25000003 PHARM REV CODE 250: Performed by: INTERNAL MEDICINE

## 2022-03-20 PROCEDURE — P9021 RED BLOOD CELLS UNIT: HCPCS | Performed by: PHYSICIAN ASSISTANT

## 2022-03-20 PROCEDURE — 25000003 PHARM REV CODE 250: Performed by: PHYSICIAN ASSISTANT

## 2022-03-20 PROCEDURE — A4216 STERILE WATER/SALINE, 10 ML: HCPCS | Performed by: PHYSICIAN ASSISTANT

## 2022-03-20 RX ORDER — ASPIRIN 81 MG/1
81 TABLET ORAL DAILY
Qty: 30 TABLET | Refills: 0 | Status: SHIPPED | OUTPATIENT
Start: 2022-03-20 | End: 2022-07-27

## 2022-03-20 RX ORDER — GUAIFENESIN/DEXTROMETHORPHAN 100-10MG/5
10 SYRUP ORAL EVERY 6 HOURS PRN
Status: DISCONTINUED | OUTPATIENT
Start: 2022-03-20 | End: 2022-03-20 | Stop reason: HOSPADM

## 2022-03-20 RX ORDER — FLUTICASONE PROPIONATE 50 MCG
2 SPRAY, SUSPENSION (ML) NASAL DAILY
Status: DISCONTINUED | OUTPATIENT
Start: 2022-03-20 | End: 2022-03-20 | Stop reason: HOSPADM

## 2022-03-20 RX ORDER — HYDROCODONE BITARTRATE AND ACETAMINOPHEN 500; 5 MG/1; MG/1
TABLET ORAL
Status: DISCONTINUED | OUTPATIENT
Start: 2022-03-20 | End: 2022-03-20 | Stop reason: HOSPADM

## 2022-03-20 RX ORDER — FERROUS SULFATE 325(65) MG
325 TABLET ORAL 2 TIMES DAILY
Qty: 60 TABLET | Refills: 1 | Status: SHIPPED | OUTPATIENT
Start: 2022-03-20 | End: 2023-04-12

## 2022-03-20 RX ORDER — FLUTICASONE PROPIONATE 50 MCG
2 SPRAY, SUSPENSION (ML) NASAL DAILY
Qty: 9.9 ML | Refills: 0 | Status: SHIPPED | OUTPATIENT
Start: 2022-03-21 | End: 2023-04-12

## 2022-03-20 RX ORDER — GUAIFENESIN/DEXTROMETHORPHAN 100-10MG/5
10 SYRUP ORAL EVERY 6 HOURS PRN
Qty: 118 ML | Refills: 0 | Status: SHIPPED | OUTPATIENT
Start: 2022-03-20 | End: 2022-03-30

## 2022-03-20 RX ADMIN — GUAIFENESIN AND DEXTROMETHORPHAN 10 ML: 100; 10 SYRUP ORAL at 04:03

## 2022-03-20 RX ADMIN — Medication 10 ML: at 05:03

## 2022-03-20 RX ADMIN — Medication 10 ML: at 03:03

## 2022-03-20 RX ADMIN — FLUTICASONE PROPIONATE 100 MCG: 50 SPRAY, METERED NASAL at 04:03
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
## 2022-03-20 NOTE — ED PROVIDER NOTES
Encounter Date: 3/19/2022    SCRIBE #1 NOTE: IAlvaro III, am scribing for, and in the presence of, Clemente Greenberg MD.       History     Chief Complaint   Patient presents with    URI     Sore throat, body aches, & malaise x 1 week. Pt reports being seen at  03/15/2022 & tested negative for Flu & Covid.      Alisha Cardenas is a 31 y.o. female, with a PMHx of asthma, who presents to the ED with multiple complaints. Patient reports that her symptoms developed 1 week ago, starting with a migraine type headache, then fatigue, and sore throat. She had an episode of emesis, and is currently nauseous. She also reports rhinnorhea, coughing, congestion, and associated sinus pressure, but she denies any shortness of breath or wheezing. She has had fevers but cannot remember her last temperature. She was seen at Urgent Care 4 days ago where she had both negative Covid and flu tests. She has been taking Dayquil, Nyquil, Motrin, Tylenol, and Benadryl with minimal improvement. No other exacerbating or alleviating factors.  No known sick contacts but she does work at a . Denies any other associated symptoms.     The history is provided by the patient.     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 2014 9:22:57 AM    [facility]: , habitual w/o pregnancy-No Additional Notes    Missed  2014 9:22:49 AM    [facility]: , Missed, Less than 22 Weeks-No Additional Notes    Prior miscarriage with pregnancy in first trimester, antepartum     Prior miscarriage with pregnancy in first  trimester, antepartum     Prior miscarriage with pregnancy in second trimester, antepartum      Past Surgical History:   Procedure Laterality Date    none       Family History   Problem Relation Age of Onset    No Known Problems Mother     No Known Problems Father     Vaginal cancer Neg Hx     Endometrial cancer Neg Hx     Cervical cancer Neg Hx      Social History     Tobacco Use    Smoking status: Never Smoker    Smokeless tobacco: Never Used   Substance Use Topics    Alcohol use: Yes     Comment: weekends    Drug use: No     Review of Systems   Constitutional: Positive for appetite change, fatigue and fever.   HENT: Positive for congestion, rhinorrhea, sinus pressure and sore throat.    Respiratory: Positive for cough. Negative for shortness of breath.    Cardiovascular: Negative for chest pain.   Gastrointestinal: Positive for nausea and vomiting.   Genitourinary: Negative for dysuria.   Musculoskeletal: Negative for back pain.   Skin: Negative for rash.   Neurological: Positive for headaches. Negative for weakness.   Hematological: Does not bruise/bleed easily.       Physical Exam     Initial Vitals [03/19/22 1900]   BP Pulse Resp Temp SpO2   (!) 116/57 108 18 99 °F (37.2 °C) 100 %      MAP       --         Physical Exam    Nursing note and vitals reviewed.  Constitutional: She appears well-developed and well-nourished. She is not diaphoretic. No distress.   HENT:   Head: Normocephalic and atraumatic.   Right Ear: External ear normal.   Left Ear: External ear normal.   Mouth/Throat: No oropharyngeal exudate.   Mild posterior pharynx erythema with no exudate.  TMs normal bilaterally   Eyes: EOM are normal.   Pale conjunctiva   Neck: Neck supple.    No cervical lymphadenopathy.    Normal range of motion.  Cardiovascular: Normal rate, regular rhythm, normal heart sounds and intact distal pulses.   No murmur heard.  Pulmonary/Chest: Breath sounds normal. She has no wheezes. She has no rhonchi. She has no  rales.   Abdominal: Abdomen is soft. There is no abdominal tenderness. There is no rebound and no guarding.   Musculoskeletal:         General: No edema.      Cervical back: Normal range of motion and neck supple.     Neurological: She is alert and oriented to person, place, and time.   Skin: Skin is warm and dry.   Psychiatric: She has a normal mood and affect.         ED Course   Procedures  Labs Reviewed   CBC W/ AUTO DIFFERENTIAL - Abnormal; Notable for the following components:       Result Value    WBC 13.88 (*)     RBC 2.73 (*)     Hemoglobin 4.4 (*)     Hematocrit 17.9 (*)     MCV 66 (*)     MCH 16.1 (*)     MCHC 24.6 (*)     RDW 35.9 (*)     Platelets 1,560 (*)     MPV 9.1 (*)     Immature Granulocytes 0.6 (*)     Gran # (ANC) 9.3 (*)     Immature Grans (Abs) 0.08 (*)     nRBC 3 (*)     Platelet Estimate Increased (*)     All other components within normal limits    Narrative:        critical result(s) called and verbal readback obtained from     TONE MANNING RN by [facility] 03/19/2022 20:20   COMPREHENSIVE METABOLIC PANEL - Abnormal; Notable for the following components:    CO2 20 (*)     Glucose 139 (*)     Alkaline Phosphatase 35 (*)     All other components within normal limits   CBC W/ AUTO DIFFERENTIAL - Abnormal; Notable for the following components:    WBC 13.48 (*)     RBC 2.81 (*)     Hemoglobin 4.6 (*)     Hematocrit 18.6 (*)     MCV 66 (*)     MCH 16.4 (*)     MCHC 24.7 (*)     RDW 35.8 (*)     Platelets 1,522 (*)     MPV 8.9 (*)     Immature Granulocytes 0.6 (*)     Gran # (ANC) 8.7 (*)     Immature Grans (Abs) 0.08 (*)     nRBC 3 (*)     All other components within normal limits    Narrative:        critical result(s) called and verbal readback obtained from   ERNIE CARRASCO RN by [facility] 03/19/2022 20:50   LACTATE DEHYDROGENASE - Abnormal; Notable for the following components:     (*)     All other components within normal limits   GROUP A STREP, MOLECULAR   HIV 1 / 2 ANTIBODY    Narrative:      Release to patient->Immediate   HEPATITIS C ANTIBODY    Narrative:     Release to patient->Immediate   RETICULOCYTES   FOLATE   VITAMIN B12   IRON AND TIBC   SICKLE CELL SCREEN   FERRITIN   SARS-COV-2 RDRP GENE   TYPE & SCREEN   PREPARE RBC SOFT     EKG Readings: (Independently Interpreted)   Normal sinus rhythm at 98, flattened T-waves diffusely, no previous       Imaging Results    None          Medications   0.9%  NaCl infusion (for blood administration) (has no administration in time range)   sodium chloride 0.9% flush 10 mL (has no administration in time range)   melatonin tablet 6 mg (has no administration in time range)   senna-docusate 8.6-50 mg per tablet 1 tablet (has no administration in time range)   acetaminophen tablet 650 mg (has no administration in time range)   naloxone 0.4 mg/mL injection 0.02 mg (has no administration in time range)   sodium chloride 0.9% bolus 1,000 mL (0 mLs Intravenous Stopped 3/19/22 2144)   ketorolac injection 15 mg (15 mg Intravenous Given 3/19/22 2046)   ondansetron injection 4 mg (4 mg Intravenous Given 3/19/22 2046)     Medical Decision Making:   History:   Old Medical Records: I decided to obtain old medical records.  Initial Assessment:       31-year-old female with history of asthma presents for evaluation of persistent URI symptoms for the past week.  She had initial onset of migraine-type headache with nausea, followed by fatigue and sore throat, nasal congestion, mild cough, and right ear fullness.  She denies any wheezing or SOB, and no vomiting or other complaints.  No known sick contacts so she does work in a .  She was seen at urgent care 4 days ago and had negative COVID and flu testing, was prescribed viscous lidocaine for sore throat and has been taking multiple OTC cold medications with no improvement.  She has been able tolerate p.o. but with decreased appetite, with her major complaint being headache and fatigue currently.  Exam reassuring with  no fever, mild tachycardia likely due to dehydration, no signs of strep throat or pneumonia, no sign of otitis media or sinusitis.  Most likely viral URI, given associated fatigue and migraine with nausea will check basic labs and treat with IVF, Toradol, Zofran.      CBC surprisingly with severe anemia with hemoglobin 4.4, and thrombocytosis with platelet count 1560. Per chart review, no recent baseline labs except for ED visit in 2018 for viral URI symptoms with hemoglobin 8.5 then.  No known history of anemia; she does endorse occasional heavy cycles for which she is on an OCP.  No symptoms of GI bleed.  CBC repeated to ensure no lab error and confirmed severe anemia and thrombocytosis.  Patient states that she occasionally felt fatigue or [place] in the past few months, but usually uses her asthma inhaler with some improvement.  Her fatigue became severe in the past week.  Discussed with Dr. Blair, hematology on-call, who suspects severe iron deficiency given low MCV, causing a reactive thrombocytosis.  He advises doing iron studies and anemia workup, and transfusing 2 units to start off with.  I had extensive discussion with patient about suspected diagnosis and need for transfusion, and she consented to this as well as admission.      Clinical Tests:   Lab Tests: Ordered and Reviewed          Scribe Attestation:   Scribe #1: I performed the above scribed service and the documentation accurately describes the services I performed. I attest to the accuracy of the note.    Attending Attestation:         Attending Critical Care:   Critical Care Times:   Direct Patient Care (initial evaluation, reassessments, and time considering the case)................................................................18 minutes.   Additional History from reviewing old medical records or taking additional history from the family, EMS, PCP, etc.......................5 minutes.   Ordering, Reviewing, and Interpreting Diagnostic  Studies...............................................................................................................5 minutes.   Documentation..................................................................................................................................................................................5 minutes.   Consultation with other Physicians. .................................................................................................................................................6 minutes.   ==============================================================  · Total Critical Care Time - exclusive of procedural time: 39 minutes.  ==============================================================  Critical Care Condition: potentially life-threatening   Critical Care Comments: Severe anemia, emergent transfusion                   I, Dr. Clemente Greenberg, personally performed the services described in this documentation. All medical record entries made by the scribe were at my direction and in my presence.  I have reviewed the chart and agree that the record reflects my personal performance and is accurate and complete. Clemente Greenberg MD.  9:54 PM 03/19/2022          Clinical Impression:   Final diagnoses:  [J06.9] Viral URI (Primary)  [D64.9] Anemia, unspecified type  [D75.839] Thrombocytosis  [D64.9] Anemia          ED Disposition Condition    Observation               Clemente Greenberg MD  03/19/22 [id number]       Clemente Greenberg MD  03/19/22 1723

## 2022-03-20 NOTE — PLAN OF CARE
03/20/22 1315   Discharge Assessment   Assessment Type Discharge Planning Assessment   Confirmed/corrected address, phone number and insurance Yes   Confirmed Demographics Correct on Facesheet   Source of Information patient   Communicated ANGELICA with patient/caregiver Yes   Reason For Admission Acute anemia   Lives With alone   Do you expect to return to your current living situation? Yes   Do you have help at home or someone to help you manage your care at home? No   Prior to hospitilization cognitive status: Alert/Oriented   Current cognitive status: Alert/Oriented   Walking or Climbing Stairs Difficulty none   Dressing/Bathing Difficulty none   Home Layout Able to live on 1st floor   Equipment Currently Used at Home none   Readmission within 30 days? No   Patient currently being followed by outpatient case management? No   Do you currently have service(s) that help you manage your care at home? No   Do you take prescription medications? Yes   Do you have prescription coverage? Yes   Coverage [employer] - [employer] CHOICE [employer]   Do you have any problems affording any of your prescribed medications? No   Is the patient taking medications as prescribed? yes   Who is going to help you get home at discharge? sister- Amy Hector - [phone]   How do you get to doctors appointments? car, drives self   Are you on dialysis? No   Do you take coumadin? No   Discharge Plan A Home   DME Needed Upon Discharge  none   Discharge Plan discussed with: Patient   Discharge Barriers Identified None

## 2022-03-20 NOTE — PLAN OF CARE
Plan of care reviewed with pt.  Pt independent with ADL's.  Pt denies pain/denies SOB.  Blood currently infusing, pt will discharge when transfusion is finished.  AVS reviewed with pt.  Pt denies questions at this time.  Telemetry discontinued, equipment returned.

## 2022-03-20 NOTE — ASSESSMENT & PLAN NOTE
- Ms. Alisha Cardenas presents with fatigue, malaise  - H&H were low at 4.6/18.6  - ED MD d/w Hematology, appreciate recs   - will complete RADHA workup   - 2 units PRBCs ordered in ED    - upon admission exam, transfusion has not started - patient's sister questioning use of iron infusion vs. Pill    - advised patient and sister that Iron studies still pending and onset of effects from iron would not be immediate     - patient advised that with her ongoing menstrual bleeding would cause H&H to continue to drop    - discussed potential outcomes of receiving vs. Refusing transfusion   - monitor labs in AM

## 2022-03-20 NOTE — SUBJECTIVE & OBJECTIVE
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 2014 9:22:57 AM    [facility]: , habitual w/o pregnancy-No Additional Notes    Missed  2014 9:22:49 AM    [facility] [place]: , Missed, Less than 22 Weeks-No Additional Notes    Prior miscarriage with pregnancy in first trimester, antepartum     Prior miscarriage with pregnancy in first trimester, antepartum     Prior miscarriage with pregnancy in second trimester, antepartum        Past Surgical History:   Procedure Laterality Date    none         Review of patient's allergies indicates:   Allergen Reactions    Shellfish containing products Anaphylaxis       No current facility-administered medications on file prior to encounter.     Current Outpatient Medications on File Prior to Encounter   Medication Sig    (Magic mouthwash) 1:1:1 diphenhydramine(Benadryl) 12.5mg/5ml liq, aluminum & magnesium hydroxide-simethicone (Maalox), LIDOcaine viscous 2% Swish and spit 10 mLs every 4 (four) hours as needed (sore throat). for mouth sores    mirabegron (MYRBETRIQ) 25 mg Tb24 ER tablet Take 2 tablets (50 mg total) by mouth once daily.    tolterodine (DETROL LA) 2 MG Cp24 Take 1 capsule (2 mg total) by mouth once daily.    albuterol 90 mcg/actuation inhaler Inhale 1-2 puffs into the lungs every 6 (six) hours as needed for Wheezing. Rescue    naproxen (NAPROSYN) 500 MG tablet Take 1 tablet (500 mg total) by mouth 2 (two) times daily as needed (back pain).    norgestimate-ethinyl estradiol (ORTHO TRI-CYCLEN,TRI-SPRINTEC) 0.18/0.215/0.25 mg-35 mcg (28) tablet Take 1 tablet by mouth once daily.     Family History       Problem Relation (Age of Onset)    No Known Problems Mother,  Father          Tobacco Use    Smoking status: Never Smoker    Smokeless tobacco: Never Used   Substance and Sexual Activity    Alcohol use: Yes     Comment: weekends    Drug use: No    Sexual activity: Yes     Partners: Male     Birth control/protection: OCP     Review of Systems   Constitutional:  Positive for appetite change, fatigue and fever. Negative for chills and diaphoresis.   HENT:  Positive for congestion, rhinorrhea, sinus pressure and sore throat. Negative for ear pain, postnasal drip and trouble swallowing.    Respiratory:  Positive for cough. Negative for chest tightness, shortness of breath and wheezing.    Cardiovascular:  Negative for chest pain and palpitations.   Gastrointestinal:  Positive for nausea and vomiting. Negative for abdominal distention, abdominal pain, constipation and diarrhea.   Genitourinary:  Negative for dysuria, flank pain, frequency, hematuria and urgency.   Musculoskeletal:  Positive for myalgias. Negative for arthralgias, back pain, gait problem, joint swelling, neck pain and neck stiffness.   Skin:  Negative for color change, pallor, rash and wound.   Neurological:  Positive for headaches. Negative for dizziness, syncope, weakness and light-headedness.   Psychiatric/Behavioral:  Negative for agitation, behavioral problems and confusion.    Objective:     Vital Signs (Most Recent):  Temp: 98.6 °F (37 °C) (03/20/22 0118)  Pulse: 93 (03/20/22 0118)  Resp: 17 (03/20/22 0118)  BP: (!) 98/51 (03/20/22 0118)  SpO2: 97 % (03/20/22 0118)   Vital Signs (24h Range):  Temp:  [98 °F (36.7 °C)-99 °F (37.2 °C)] 98.6 °F (37 °C)  Pulse:  [] 93  Resp:  [16-23] 17  SpO2:  [96 %-100 %] 97 %  BP: ()/(51-63) 98/51     Weight: 81.6 kg (180 lb)  Body mass index is 32.92 kg/m².    Physical Exam  Vitals and nursing note reviewed.   Constitutional:       General: She is not in acute distress.     Appearance: She is well-developed. She is obese. She is not ill-appearing, toxic-appearing or  diaphoretic.   HENT:      Head: Normocephalic and atraumatic.      Mouth/Throat:      Comments: Pale mucous membranes.  Eyes:      General: No scleral icterus.        Right eye: No discharge.         Left eye: No discharge.      Comments: Pale conjunctiva.    Neck:      Trachea: No tracheal deviation.   Cardiovascular:      Rate and Rhythm: Normal rate and regular rhythm.      Heart sounds: Normal heart sounds. No murmur heard.    No gallop.   Pulmonary:      Effort: Pulmonary effort is normal. No respiratory distress.      Breath sounds: Normal breath sounds. No stridor. No wheezing or rales.   Abdominal:      General: Bowel sounds are normal. There is no distension.      Palpations: Abdomen is soft. There is no mass.      Tenderness: There is no abdominal tenderness. There is no guarding.   Musculoskeletal:         General: No deformity. Normal range of motion.      Cervical back: Normal range of motion and neck supple.   Skin:     General: Skin is warm and dry.      Coloration: Skin is not pale.      Findings: No erythema or rash.   Neurological:      General: No focal deficit present.      Mental Status: She is alert and oriented to person, place, and time.      Cranial Nerves: No cranial nerve deficit.      Motor: No abnormal muscle tone.   Psychiatric:         Mood and Affect: Mood normal.         Behavior: Behavior normal.         Thought Content: Thought content normal.         Judgment: Judgment normal.           Significant Labs: All pertinent labs within the past 24 hours have been reviewed.  BMP:   Recent Labs   Lab 03/19/22 2010   *      K 3.7      CO2 20*   BUN 9   CREATININE 0.9   CALCIUM 9.3     CBC:   Recent Labs   Lab 03/19/22 2010 03/19/22  2042   WBC 13.88* 13.48*   HGB 4.4* 4.6*   HCT 17.9* 18.6*   PLT 1,560* 1,522*     CMP:   Recent Labs   Lab 03/19/22 2010      K 3.7      CO2 20*   *   BUN 9   CREATININE 0.9   CALCIUM 9.3   PROT 7.9   ALBUMIN 3.5    BILITOT 0.2   ALKPHOS 35*   AST 17   ALT 10   ANIONGAP 15   EGFRNONAA >60       Significant Imaging: I have reviewed all pertinent imaging results/findings within the past 24 hours.    Imaging Results    None

## 2022-03-20 NOTE — HPI
Ms. Alisha Cardenas is a 31 y.o. female, with PMH of asthma, who presented to [facility] ED on 3/19/22 due to URI symptoms x 1 week. She notes sore throat, body aches, fatigue, malaise x 1 week. She is currently nauseous, and recently vomited. She further notes rhinorrhea, cough, congestion, and sinus pressure. She endorses subjective fever. She was seen at Urgent Care on 3/15 and tested negative for flu and Covid. She has been using Dayquil, Nyquil, motrin, tylenol, and benadryl with minimal improvement. She was evaluated in the ED, where labs showed low H&H of 4.6/18.6, MCV 66, MCH 16.4, MCHC of 24.7, and elevated PLT count of 1522. LD was 307, ALP was 35, metabolic panel was otherwise unremarkable. The patient states she has heavy menses, and often fills 4-5 super pads daily and passes multiple large blood clots throughout the day. Her menstrual cycle started yesterday, and became heavier today. Case discussed between ED MD and on call Hematologist, with recs for evaluation for RADHA, and transfusion of 2 units PRBCs. She was placed on OBS.

## 2022-03-20 NOTE — H&P
[facility] Medicine  History & Physical    Patient Name: Alisha Cardenas  MRN: [id number]  Patient Class: OP- Observation  Admission Date: 3/19/2022  Attending Physician: Jacquie Borges MD   Primary Care Provider: Primary Doctor No         Patient information was obtained from patient, relative(s) and ER records.     Subjective:     Principal Problem:Acute anemia    Chief Complaint:   Chief Complaint   Patient presents with    URI     Sore throat, body aches, & malaise x 1 week. Pt reports being seen at  03/15/2022 & tested negative for Flu & Covid.         HPI: Ms. Alisha Cardenas is a 31 y.o. female, with PMH of asthma, who presented to [facility] [place] ED on 3/19/22 due to URI symptoms x 1 week. She notes sore throat, body aches, fatigue, malaise x 1 week. She is currently nauseous, and recently vomited. She further notes rhinorrhea, cough, congestion, and sinus pressure. She endorses subjective fever. She was seen at Urgent Care on 3/15 and tested negative for flu and Covid. She has been using Dayquil, Nyquil, motrin, tylenol, and benadryl with minimal improvement. She was evaluated in the ED, where labs showed low H&H of 4.6/18.6, MCV 66, MCH 16.4, MCHC of 24.7, and elevated PLT count of 1522. LD was 307, ALP was 35, metabolic panel was otherwise unremarkable. The patient states she has heavy menses, and often fills 4-5 super pads daily and passes multiple large blood clots throughout the day. Her menstrual cycle started yesterday, and became heavier today. Case discussed between ED MD and on call Hematologist, with recs for evaluation for RADHA, and transfusion of 2 units PRBCs. She was placed on OBS.       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 2014  9:22:57 AM    [facility]: , habitual w/o pregnancy-No Additional Notes    Missed  2014 9:22:49 AM    [facility]: , Missed, Less than 22 Weeks-No Additional Notes    Prior miscarriage with pregnancy in first trimester, antepartum     Prior miscarriage with pregnancy in first trimester, antepartum     Prior miscarriage with pregnancy in second trimester, antepartum        Past Surgical History:   Procedure Laterality Date    none         Review of patient's allergies indicates:   Allergen Reactions    Shellfish containing products Anaphylaxis       No current facility-administered medications on file prior to encounter.     Current Outpatient Medications on File Prior to Encounter   Medication Sig    (Magic mouthwash) 1:1:1 diphenhydramine(Benadryl) 12.5mg/5ml liq, aluminum & magnesium hydroxide-simethicone (Maalox), LIDOcaine viscous 2% Swish and spit 10 mLs every 4 (four) hours as needed (sore throat). for mouth sores    mirabegron (MYRBETRIQ) 25 mg Tb24 ER tablet Take 2 tablets (50 mg total) by mouth once daily.    tolterodine (DETROL LA) 2 MG Cp24 Take 1 capsule (2 mg total) by mouth once daily.    albuterol 90 mcg/actuation inhaler Inhale 1-2 puffs into the lungs every 6 (six) hours as needed for Wheezing. Rescue    naproxen (NAPROSYN) 500 MG tablet Take 1 tablet (500 mg total) by mouth 2 (two) times daily as needed (back pain).    norgestimate-ethinyl estradiol (ORTHO TRI-CYCLEN,TRI-SPRINTEC) 0.18/0.215/0.25 mg-35 mcg (28) tablet Take 1 tablet by mouth once daily.     Family History       Problem Relation (Age of Onset)    No Known Problems Mother, Father          Tobacco Use    Smoking status: Never Smoker    Smokeless tobacco: Never Used   Substance and Sexual Activity    Alcohol use: Yes     Comment: weekends    Drug use: No    Sexual activity: Yes     Partners: Male     Birth control/protection: OCP     Review of Systems    Constitutional:  Positive for appetite change, fatigue and fever. Negative for chills and diaphoresis.   HENT:  Positive for congestion, rhinorrhea, sinus pressure and sore throat. Negative for ear pain, postnasal drip and trouble swallowing.    Respiratory:  Positive for cough. Negative for chest tightness, shortness of breath and wheezing.    Cardiovascular:  Negative for chest pain and palpitations.   Gastrointestinal:  Positive for nausea and vomiting. Negative for abdominal distention, abdominal pain, constipation and diarrhea.   Genitourinary:  Negative for dysuria, flank pain, frequency, hematuria and urgency.   Musculoskeletal:  Positive for myalgias. Negative for arthralgias, back pain, gait problem, joint swelling, neck pain and neck stiffness.   Skin:  Negative for color change, pallor, rash and wound.   Neurological:  Positive for headaches. Negative for dizziness, syncope, weakness and light-headedness.   Psychiatric/Behavioral:  Negative for agitation, behavioral problems and confusion.    Objective:     Vital Signs (Most Recent):  Temp: 98.6 °F (37 °C) (03/20/22 0118)  Pulse: 93 (03/20/22 0118)  Resp: 17 (03/20/22 0118)  BP: (!) 98/51 (03/20/22 0118)  SpO2: 97 % (03/20/22 0118)   Vital Signs (24h Range):  Temp:  [98 °F (36.7 °C)-99 °F (37.2 °C)] 98.6 °F (37 °C)  Pulse:  [] 93  Resp:  [16-23] 17  SpO2:  [96 %-100 %] 97 %  BP: ()/(51-63) 98/51     Weight: 81.6 kg (180 lb)  Body mass index is 32.92 kg/m².    Physical Exam  Vitals and nursing note reviewed.   Constitutional:       General: She is not in acute distress.     Appearance: She is well-developed. She is obese. She is not ill-appearing, toxic-appearing or diaphoretic.   HENT:      Head: Normocephalic and atraumatic.      Mouth/Throat:      Comments: Pale mucous membranes.  Eyes:      General: No scleral icterus.        Right eye: No discharge.         Left eye: No discharge.      Comments: Pale conjunctiva.    Neck:      Trachea:  No tracheal deviation.   Cardiovascular:      Rate and Rhythm: Normal rate and regular rhythm.      Heart sounds: Normal heart sounds. No murmur heard.    No gallop.   Pulmonary:      Effort: Pulmonary effort is normal. No respiratory distress.      Breath sounds: Normal breath sounds. No stridor. No wheezing or rales.   Abdominal:      General: Bowel sounds are normal. There is no distension.      Palpations: Abdomen is soft. There is no mass.      Tenderness: There is no abdominal tenderness. There is no guarding.   Musculoskeletal:         General: No deformity. Normal range of motion.      Cervical back: Normal range of motion and neck supple.   Skin:     General: Skin is warm and dry.      Coloration: Skin is not pale.      Findings: No erythema or rash.   Neurological:      General: No focal deficit present.      Mental Status: She is alert and oriented to person, place, and time.      Cranial Nerves: No cranial nerve deficit.      Motor: No abnormal muscle tone.   Psychiatric:         Mood and Affect: Mood normal.         Behavior: Behavior normal.         Thought Content: Thought content normal.         Judgment: Judgment normal.           Significant Labs: All pertinent labs within the past 24 hours have been reviewed.  BMP:   Recent Labs   Lab 03/19/22 2010   *      K 3.7      CO2 20*   BUN 9   CREATININE 0.9   CALCIUM 9.3     CBC:   Recent Labs   Lab 03/19/22 2010 03/19/22  2042   WBC 13.88* 13.48*   HGB 4.4* 4.6*   HCT 17.9* 18.6*   PLT 1,560* 1,522*     CMP:   Recent Labs   Lab 03/19/22 2010      K 3.7      CO2 20*   *   BUN 9   CREATININE 0.9   CALCIUM 9.3   PROT 7.9   ALBUMIN 3.5   BILITOT 0.2   ALKPHOS 35*   AST 17   ALT 10   ANIONGAP 15   EGFRNONAA >60       Significant Imaging: I have reviewed all pertinent imaging results/findings within the past 24 hours.    Imaging Results    None          Assessment/Plan:     * Acute anemia  - Ms. Alisha Cardenas presents  with fatigue, malaise  - H&H were low at 4.6/18.6  - ED MD d/w Hematology, appreciate recs   - will complete RADHA workup   - 2 units PRBCs ordered in ED    - upon admission exam, transfusion has not started - patient's sister questioning use of iron infusion vs. Pill    - advised patient and sister that Iron studies still pending and onset of effects from iron would not be immediate     - patient advised that with her ongoing menstrual bleeding would cause H&H to continue to drop    - discussed potential outcomes of receiving vs. Refusing transfusion   - monitor labs in AM     Viral syndrome  - respiratory viral panel ordered   - symptomatic treatment       VTE Risk Mitigation (From admission, onward)         Ordered     IP VTE HIGH RISK PATIENT  Once         03/19/22 2227     Place sequential compression device  Until discontinued         03/19/22 2227     Reason for No Pharmacological VTE Prophylaxis  Once        Question:  Reasons:  Answer:  Physician Provided (leave comment)    03/19/22 2227                   Natalya Koch PA-C  Department of Hospital Medicine   [facility] - Med Surg ([place])

## 2022-03-20 NOTE — PLAN OF CARE
03/20/22 1315   Discharge Assessment   Assessment Type Discharge Planning Assessment   Confirmed/corrected address, phone number and insurance Yes   Confirmed Demographics Correct on Facesheet   Source of Information patient   Communicated ANGELICA with patient/caregiver Yes   Reason For Admission Acute anemia   Lives With alone   Do you expect to return to your current living situation? Yes   Do you have help at home or someone to help you manage your care at home? No   Prior to hospitilization cognitive status: Alert/Oriented   Current cognitive status: Alert/Oriented   Walking or Climbing Stairs Difficulty none   Dressing/Bathing Difficulty none   Home Layout Able to live on 1st floor   Equipment Currently Used at Home none   Readmission within 30 days? No   Patient currently being followed by outpatient case management? No   Do you currently have service(s) that help you manage your care at home? No   Do you take prescription medications? Yes   Do you have prescription coverage? Yes   Coverage AETNA - AETNA CHOICE POS   Do you have any problems affording any of your prescribed medications? No   Is the patient taking medications as prescribed? yes   Who is going to help you get home at discharge? sister- Amy Hector - [phone]   How do you get to doctors appointments? car, drives self   Are you on dialysis? No   Do you take coumadin? No   Discharge Plan A Home   DME Needed Upon Discharge  none   Discharge Plan discussed with: Patient   Discharge Barriers Identified None   [facility] ([place])  Initial Discharge Assessment       Primary Care Provider: Primary Doctor No    Admission Diagnosis: Anemia [D64.9]  Viral URI [J06.9]  Thrombocytosis [D75.839]  Anemia, unspecified type [D64.9]    Admission Date: 3/19/2022  Expected Discharge Date:     Discharge Barriers Identified: (P) None    Payor: AETNA / Plan: AETNA CHOICE POS / Product Type: Commercial /     Extended Emergency Contact Information  Primary  Emergency Contact: Ruy Das   [place]  Mobile Phone: [phone]  Relation: Significant other    Discharge Plan A: (P) Home         [id number] [place] - [address]  [address]  [place] [address]  Phone: [phone] Fax: [phone]      Initial Assessment (most recent)       Adult Discharge Assessment - 03/20/22 1315          Discharge Assessment    Assessment Type Discharge Planning Assessment     Confirmed/corrected address, phone number and insurance Yes     Confirmed Demographics Correct on Facesheet     Source of Information patient     Communicated ANGELICA with patient/caregiver Yes     Reason For Admission Acute anemia (P)      Lives With alone (P)      Do you expect to return to your current living situation? Yes (P)      Do you have help at home or someone to help you manage your care at home? No (P)      Prior to hospitilization cognitive status: Alert/Oriented (P)      Current cognitive status: Alert/Oriented (P)      Walking or Climbing Stairs Difficulty none (P)      Dressing/Bathing Difficulty none (P)      Home Layout Able to live on 1st floor (P)      Equipment Currently Used at Home none (P)      Readmission within 30 days? No (P)      Patient currently being followed by outpatient case management? No (P)      Do you currently have service(s) that help you manage your care at home? No (P)      Do you take prescription medications? Yes (P)      Do you have prescription coverage? Yes (P)      Coverage AETNA - AETNA CHOICE POS (P)      Do you have any problems affording any of your prescribed medications? No (P)      Is the patient taking medications as prescribed? yes (P)      Who is going to help you get home at discharge? sister- Amy Hector - [phone] (P)      How do you get to doctors appointments? car, drives self (P)      Are you on dialysis? No (P)      Do you take coumadin? No (P)      Discharge Plan A Home (P)      DME  Needed Upon Discharge  none (P)      Discharge Plan discussed with: Patient (P)      Discharge Barriers Identified None (P)

## 2022-03-20 NOTE — PLAN OF CARE
POC reviewed with patient. AAOx4. VS stable on room air. No  complaints verbalized during shift. One unit of blood transfused and another unit currently infusing. Positions self  and voids independently. No injuries, falls, or trauma occurred during shift. Purposeful rounding completed. Bed low and locked with side rails up x 3 and call light within reach. Sister at bedside.

## 2022-03-20 NOTE — SUBJECTIVE & OBJECTIVE
Interval History: feels better, still with some congestion and headache.    Review of Systems   Constitutional:  Positive for fatigue. Negative for chills and fever.   HENT:  Positive for congestion. Negative for trouble swallowing.    Respiratory:  Negative for cough and shortness of breath.    Cardiovascular:  Negative for chest pain and leg swelling.   Gastrointestinal:  Negative for abdominal pain, blood in stool, nausea and vomiting.   Genitourinary:  Positive for vaginal bleeding. Negative for dysuria and hematuria.   Musculoskeletal:  Negative for gait problem.   Skin:  Negative for rash.   Neurological:  Positive for headaches.   Psychiatric/Behavioral:  Negative for confusion.    Objective:     Vital Signs (Most Recent):  Temp: 98.2 °F (36.8 °C) (03/20/22 1629)  Pulse: 83 (03/20/22 1629)  Resp: 18 (03/20/22 1629)  BP: 124/74 (03/20/22 1629)  SpO2: 98 % (03/20/22 1629) Vital Signs (24h Range):  Temp:  [98 °F (36.7 °C)-99 °F (37.2 °C)] 98.2 °F (36.8 °C)  Pulse:  [] 83  Resp:  [16-23] 18  SpO2:  [96 %-100 %] 98 %  BP: ()/(51-74) 124/74     Weight: 74.9 kg (165 lb 2 oz)  Body mass index is 30.2 kg/m².    Intake/Output Summary (Last 24 hours) at 3/20/2022 1635  Last data filed at 3/20/2022 0727  Gross per 24 hour   Intake 1008.33 ml   Output --   Net 1008.33 ml      Physical Exam  Vitals reviewed.   Constitutional:       General: She is not in acute distress.     Appearance: She is well-developed.   HENT:      Head: Normocephalic and atraumatic.   Eyes:      Extraocular Movements: Extraocular movements intact.      Pupils: Pupils are equal, round, and reactive to light.   Cardiovascular:      Rate and Rhythm: Normal rate and regular rhythm.   Pulmonary:      Effort: Pulmonary effort is normal. No respiratory distress.      Breath sounds: Normal breath sounds.   Abdominal:      General: Bowel sounds are normal. There is no distension.      Palpations: Abdomen is soft.      Tenderness: There is no  abdominal tenderness.   Musculoskeletal:         General: No swelling. Normal range of motion.      Cervical back: Normal range of motion and neck supple.   Skin:     General: Skin is warm.      Coloration: Skin is pale.      Findings: No rash.   Neurological:      General: No focal deficit present.      Mental Status: She is alert and oriented to person, place, and time.   Psychiatric:         Mood and Affect: Mood normal.         Behavior: Behavior normal.       Significant Labs: All pertinent labs within the past 24 hours have been reviewed.    Significant Imaging: I have reviewed all pertinent imaging results/findings within the past 24 hours.

## 2022-03-23 LAB
ENTEROVIRUS/RHINOVIRUS: NOT DETECTED
HUMAN BOCAVIRUS: NOT DETECTED
HUMAN CORONAVIRUS, COMMON COLD VIRUS: NOT DETECTED
INFLUENZA A - H1N1-09: NOT DETECTED
PARAINFLUENZA: POSITIVE
RVP - ADENOVIRUS: NOT DETECTED
RVP - HUMAN METAPNEUMOVIRUS (HMPV): NOT DETECTED
RVP - INFLUENZA A: NOT DETECTED
RVP - INFLUENZA B: NOT DETECTED
RVP - RESPIRATORY SYNCTIAL VIRUS (RSV) A: NOT DETECTED
RVP - RESPIRATORY VIRAL PANEL, SOURCE: ABNORMAL

## 2022-03-29 NOTE — DISCHARGE SUMMARY
[facility] Medicine  Discharge Summary      Patient Name: Alisha Cardenas  MRN: [id number]  Patient Class: OP- Observation  Admission Date: 3/19/2022  Hospital Length of Stay: 0 days  Discharge Date and Time: 3/20/2022  6:27 PM  Attending Physician: Mattie att. providers found   Discharging Provider: Jacquie Borges MD  Primary Care Provider: Primary Doctor Mattie      HPI:   Ms. Alisha Cardenas is a 31 y.o. female, with PMH of asthma, who presented to [facility] ED on 3/19/22 due to URI symptoms x 1 week. She notes sore throat, body aches, fatigue, malaise x 1 week. She is currently nauseous, and recently vomited. She further notes rhinorrhea, cough, congestion, and sinus pressure. She endorses subjective fever. She was seen at Urgent Care on 3/15 and tested negative for flu and Covid. She has been using Dayquil, Nyquil, motrin, tylenol, and benadryl with minimal improvement. She was evaluated in the ED, where labs showed low H&H of 4.6/18.6, MCV 66, MCH 16.4, MCHC of 24.7, and elevated PLT count of 1522. LD was 307, ALP was 35, metabolic panel was otherwise unremarkable. The patient states she has heavy menses, and often fills 4-5 super pads daily and passes multiple large blood clots throughout the day. Her menstrual cycle started yesterday, and became heavier today. Case discussed between ED MD and on call Hematologist, with recs for evaluation for RADHA, and transfusion of 2 units PRBCs. She was placed on OBS.       * No surgery found *      Hospital Course:   Patient was on her periods but overall bleeding was ok.She denies being ever told she was anemic or getting  blood transfusion but last cbc from 11/ 2018 in care everywhere showed h/h 8.5/28. She was transfused 2 units of blood with h/h upto 6.8/23.5.She was given another unit of blood before dc.Platelets count improved from admit.Discussed with hem onc, since it was more than 1000, she was prescribed asa 81 mg daily after per periods and stop it if  bleeding worsens.Iron profile showed iron deficiency anemia.Patient will be started on iron pills on dc with outpatient hem follow up.Sickle cell screen was positive.  Vaginal us from 04/21 showed multi fibroid uterus, patient has regular periods with heavy bleeding first 2-3 days, continue ocp and follow ob gyn outpatient.    For viral syndrome ,she was treated symptomatically with meds for congestion.Viral panel was pending before d       Goals of Care Treatment Preferences:         Consults:     No new Assessment & Plan notes have been filed under this hospital service since the last note was generated.  Service: Hospital Medicine    Final Active Diagnoses:    Diagnosis Date Noted POA    PRINCIPAL PROBLEM:  Acute anemia [D64.9] 03/19/2022 Yes    Viral syndrome [B34.9] 03/19/2022 Yes      Problems Resolved During this Admission:       Discharged Condition: stable    Disposition: Home or Self Care    Follow Up:   Follow-up Information     Primary Doctor No Follow up.                     Patient Instructions:      Ambulatory referral/consult to Hematology / Oncology   Standing Status: Future   Referral Priority: Routine Referral Type: Consultation   Referral Reason: Specialty Services Required   Requested Specialty: Hematology and Oncology   Number of Visits Requested: 1     Ambulatory referral/consult to Obstetrics / Gynecology   Standing Status: Future   Referral Priority: Routine Referral Type: Consultation   Referral Reason: Specialty Services Required   Requested Specialty: Obstetrics and Gynecology   Number of Visits Requested: 1     Diet Adult Regular     Activity as tolerated       Significant Diagnostic Studies:   Lab Results   Component Value Date    WBC 11.35 03/20/2022    HGB 6.8 (L) 03/20/2022    HCT 23.5 (L) 03/20/2022    MCV 72 (L) 03/20/2022    PLT 1,179 (HH) 03/20/2022       BMP  Lab Results   Component Value Date     03/20/2022    K 4.0 03/20/2022     03/20/2022    CO2 23 03/20/2022     BUN 8 03/20/2022    CREATININE 0.7 03/20/2022    CALCIUM 8.6 (L) 03/20/2022    ANIONGAP 9 03/20/2022    ESTGFRAFRICA >60 03/20/2022    EGFRNONAA >60 03/20/2022     Lab Results   Component Value Date    IRON 18 (L) 03/19/2022    TIBC 653 (H) 03/19/2022    FERRITIN 3 (L) 03/19/2022       US Pelvis Comp with Transvag NON-OB (xpd  Narrative: EXAMINATION:  US PELVIS COMP WITH TRANSVAG NON-OB (XPD)    CLINICAL HISTORY:  Hypertrophy of uterus    TECHNIQUE:  Transabdominal sonography of the pelvis was performed, followed by transvaginal sonography to better evaluate the uterus and ovaries.    COMPARISON:  None.    FINDINGS:  Uterus:    Size: 9.3 x 6.4 x 6.8 cm    Masses: 2 intramural fibroids are noted in the uterus, measuring 4 x 3.4 x 4.1 cm in the anterior body of the uterus and 3.4 x 2.9 x 3.2 cm in the left fundal aspect of the uterus.    Endometrium: Normal in this pre menopausal patient, measuring 6 mm.  Small amount of fluid noted in the endometrial cavity.    Right ovary:    Size: 2.9 x 2.3 x 3.5 cm    Appearance: A prominent follicle/cyst is noted in the right ovary, measuring 2.6 x 1.2 x 2.4 cm.    Vascular flow: Normal.    Left ovary:    Size: 4.3 x 4.6 x 2.5 cm    Appearance: Several prominent follicles are noted in the left ovary.    Vascular Flow: Normal.    Free Fluid:    None.  Impression: Multi-fibroid uterus.    Electronically signed by: Prasanna Mccary  Date:    04/09/2021  Time:    08:40        Pending Diagnostic Studies:     None         Medications:  Reconciled Home Medications:      Medication List      START taking these medications    aspirin 81 MG EC tablet  Commonly known as: ECOTRIN  Take 1 tablet (81 mg total) by mouth once daily. Start after finishing  period     dextromethorphan-guaiFENesin  mg/5 ml  mg/5 mL liquid  Commonly known as: ROBITUSSIN-DM  Take 10 mLs by mouth every 6 (six) hours as needed (cough).     ferrous sulfate 325 mg (65 mg iron) Tab tablet  Commonly known as:  FEOSOL  Take 1 tablet (325 mg total) by mouth 2 (two) times daily.     fluticasone propionate 50 mcg/actuation nasal spray  Commonly known as: FLONASE  2 sprays (100 mcg total) by Each Nostril route once daily.        CONTINUE taking these medications    (MAGIC MOUTHWASH) 1:1:1 BENADRYL 12.5MG/5ML LIQ, ALUMINUM & MAGNESIUM  Swish and spit 10 mLs every 4 (four) hours as needed (sore throat). for mouth sores     albuterol 90 mcg/actuation inhaler  Commonly known as: PROVENTIL/VENTOLIN HFA  Inhale 1-2 puffs into the lungs every 6 (six) hours as needed for Wheezing. Rescue     mirabegron 25 mg Tb24 ER tablet  Commonly known as: MYRBETRIQ  Take 2 tablets (50 mg total) by mouth once daily.     norgestimate-ethinyl estradioL 0.18/0.215/0.25 mg-35 mcg (28) tablet  Commonly known as: ORTHO TRI-CYCLEN,TRI-SPRINTEC  Take 1 tablet by mouth once daily.     tolterodine 2 MG Cp24  Commonly known as: DETROL LA  Take 1 capsule (2 mg total) by mouth once daily.        STOP taking these medications    naproxen 500 MG tablet  Commonly known as: NAPROSYN            Indwelling Lines/Drains at time of discharge:   Lines/Drains/Airways     None                 Time spent on the discharge of patient: 35 minutes         Jacquie Borges MD  Department of Hospital Medicine  [facility] ([place])

## 2022-03-29 NOTE — HOSPITAL COURSE
Patient was on her periods but overall bleeding was ok.She denies being ever told she was anemic or getting  blood transfusion but last cbc from 11/ 2018 in care everywhere showed h/h 8.5/28. She was transfused 2 units of blood with h/h upto 6.8/23.5.She was given another unit of blood before dc.Platelets count improved from admit.Discussed with hem onc, since it was more than 1000, she was prescribed asa 81 mg daily after per periods and stop it if bleeding worsens.Iron profile showed iron deficiency anemia.Patient will be started on iron pills on dc with outpatient hem follow up.Sickle cell screen was positive.  Vaginal us from 04/21 showed multi fibroid uterus, patient has regular periods with heavy bleeding first 2-3 days, continue ocp and follow ob gyn outpatient.    For viral syndrome ,she was treated symptomatically with meds for congestion.Viral panel was pending before dc.

## 2022-04-11 ENCOUNTER — OFFICE VISIT (OUTPATIENT)
Dept: OBSTETRICS AND GYNECOLOGY | Facility: CLINIC | Age: 32
End: 2022-04-11
Attending: OBSTETRICS & GYNECOLOGY
Payer: COMMERCIAL

## 2022-04-11 VITALS
WEIGHT: 164.44 LBS | BODY MASS INDEX: 31.05 KG/M2 | SYSTOLIC BLOOD PRESSURE: 104 MMHG | HEIGHT: 61 IN | DIASTOLIC BLOOD PRESSURE: 70 MMHG

## 2022-04-11 DIAGNOSIS — D21.9 FIBROIDS: Primary | ICD-10-CM

## 2022-04-11 DIAGNOSIS — N93.9 ABNORMAL UTERINE BLEEDING: ICD-10-CM

## 2022-04-11 DIAGNOSIS — D64.9 ANEMIA, UNSPECIFIED TYPE: ICD-10-CM

## 2022-04-11 LAB
B-HCG UR QL: NEGATIVE
CTP QC/QA: YES

## 2022-04-11 PROCEDURE — 88305 TISSUE EXAM BY PATHOLOGIST: CPT | Mod: 26,,, | Performed by: PATHOLOGY

## 2022-04-11 PROCEDURE — 99999 PR PBB SHADOW E&M-EST. PATIENT-LVL IV: CPT | Mod: PBBFAC,,, | Performed by: OBSTETRICS & GYNECOLOGY

## 2022-04-11 PROCEDURE — 81025 POCT URINE PREGNANCY: ICD-10-PCS | Mod: S$GLB,,, | Performed by: OBSTETRICS & GYNECOLOGY

## 2022-04-11 PROCEDURE — 88305 TISSUE EXAM BY PATHOLOGIST: CPT | Performed by: PATHOLOGY

## 2022-04-11 PROCEDURE — 81025 URINE PREGNANCY TEST: CPT | Mod: S$GLB,,, | Performed by: OBSTETRICS & GYNECOLOGY

## 2022-04-11 PROCEDURE — 99203 OFFICE O/P NEW LOW 30 MIN: CPT | Mod: S$GLB,,, | Performed by: OBSTETRICS & GYNECOLOGY

## 2022-04-11 PROCEDURE — 99203 PR OFFICE/OUTPT VISIT, NEW, LEVL III, 30-44 MIN: ICD-10-PCS | Mod: S$GLB,,, | Performed by: OBSTETRICS & GYNECOLOGY

## 2022-04-11 PROCEDURE — 99999 PR PBB SHADOW E&M-EST. PATIENT-LVL IV: ICD-10-PCS | Mod: PBBFAC,,, | Performed by: OBSTETRICS & GYNECOLOGY

## 2022-04-11 PROCEDURE — 88305 TISSUE EXAM BY PATHOLOGIST: ICD-10-PCS | Mod: 26,,, | Performed by: PATHOLOGY

## 2022-04-11 RX ORDER — NORGESTIMATE AND ETHINYL ESTRADIOL 0.25-0.035
1 KIT ORAL DAILY
Qty: 90 TABLET | Refills: 3 | Status: SHIPPED | OUTPATIENT
Start: 2022-04-11 | End: 2023-04-12

## 2022-04-11 NOTE — PROGRESS NOTES
HISTORY OF PRESENT ILLNESS:    Alisha Cardenas is a 31 y.o. female, , Patient's last menstrual period was 2022.,  presents for fibroids and AUB    Long hx heavy cycles.  On OCPs since age 23 for contraception.  OCPs don't help with bleeding.  Minimal cramping.    3/21 u/s:  Uterus  9.3 x 6.4 x 6.8 cm.  2 intramural fibroids are noted in the uterus, measuring 4 x 3.4 x 4.1 cm in the anterior body of the uterus and 3.4 x 2.9 x 3.2 cm in the left fundal aspect of the uterus.  Endometrium 6 mm.  Small amount of fluid noted in the endometrial cavity.  Right ovary 2.9 x 2.3 x 3.5 cm.  A prominent follicle/cyst measuring 2.6 x 1.2 x 2.4 cm.  Left ovary 4.3 x 4.6 x 2.5 cm.  Several prominent follicles are noted in the left ovary.     Sees urogyn for urinary frequency & urgency    Age 16 EAB  Age 18 & 20 SAB  Desires future fertility      Past Medical History:   Diagnosis Date    Asthma     reports as a child    Astigmatism of left eye     Complete spontaneous  2014 9:22:39 AM    [facility]: , spontaneous, complete-No Additional Notes    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 2014 9:22:57 AM    [facility]: , habitual w/o pregnancy-No Additional Notes    Missed  2014 9:22:49 AM    [facility]: , Missed, Less than 22 Weeks-No Additional Notes    Prior miscarriage with pregnancy in first trimester, antepartum     Prior miscarriage with pregnancy in first trimester, antepartum     Prior miscarriage with pregnancy in second trimester, antepartum        Past Surgical History:   Procedure Laterality Date    none         MEDICATIONS AND ALLERGIES:      Current Outpatient Medications:     ferrous sulfate (FEOSOL) 325 mg (65 mg iron) Tab tablet, Take 1 tablet (325 mg total) by mouth 2 (two) times daily., Disp: 60 tablet, Rfl: 1    tolterodine (DETROL LA) 2 MG Cp24, Take 1 capsule (2 mg total) by  mouth once daily., Disp: 30 capsule, Rfl: 11    (Magic mouthwash) 1:1:1 diphenhydramine(Benadryl) 12.5mg/5ml liq, aluminum & magnesium hydroxide-simethicone (Maalox), LIDOcaine viscous 2%, Swish and spit 10 mLs every 4 (four) hours as needed (sore throat). for mouth sores, Disp: 360 mL, Rfl: 0    albuterol 90 mcg/actuation inhaler, Inhale 1-2 puffs into the lungs every 6 (six) hours as needed for Wheezing. Rescue, Disp: 1 Inhaler, Rfl: 0    aspirin (ECOTRIN) 81 MG EC tablet, Take 1 tablet (81 mg total) by mouth once daily. Start after finishing  period (Patient not taking: Reported on 4/11/2022), Disp: 30 tablet, Rfl: 0    fluticasone propionate (FLONASE) 50 mcg/actuation nasal spray, 2 sprays (100 mcg total) by Each Nostril route once daily. (Patient not taking: Reported on 4/11/2022), Disp: 9.9 mL, Rfl: 0    mirabegron (MYRBETRIQ) 25 mg Tb24 ER tablet, Take 2 tablets (50 mg total) by mouth once daily. (Patient not taking: Reported on 4/11/2022), Disp: 30 tablet, Rfl: 11    norgestimate-ethinyl estradioL (ORTHO-CYCLEN) 0.25-35 mg-mcg per tablet, Take 1 tablet by mouth once daily., Disp: 90 tablet, Rfl: 3    Review of patient's allergies indicates:   Allergen Reactions    Shellfish containing products Anaphylaxis       Family History   Problem Relation Age of Onset    No Known Problems Mother     No Known Problems Father     Vaginal cancer Neg Hx     Endometrial cancer Neg Hx     Cervical cancer Neg Hx        Social History     Socioeconomic History    Marital status: Single   Tobacco Use    Smoking status: Never Smoker    Smokeless tobacco: Never Used   Substance and Sexual Activity    Alcohol use: Yes     Comment: weekends    Drug use: No    Sexual activity: Yes     Partners: Male     Birth control/protection: OCP     Social Determinants of Health     Financial Resource Strain: Low Risk     Difficulty of Paying Living Expenses: Not very hard   Food Insecurity: No Food Insecurity    Worried  "About Running Out of Food in the Last Year: Never true    Ran Out of Food in the Last Year: Never true   Transportation Needs: No Transportation Needs    Lack of Transportation (Medical): No    Lack of Transportation (Non-Medical): No   Housing Stability: Low Risk     Unable to Pay for Housing in the Last Year: No    Number of Places Lived in the Last Year: 1    Unstable Housing in the Last Year: No       ROS:  GENERAL: No weight changes. No swelling. No fatigue. No fever.  CARDIOVASCULAR: No chest pain. No shortness of breath. No leg cramps.   NEUROLOGICAL: No headaches. No vision changes.  BREASTS: No pain. No lumps. No discharge.  ABDOMEN: No pain. No nausea. No vomiting. No diarrhea. No constipation.  REPRODUCTIVE: see above  VULVA: No pain. No lesions. No itching.  VAGINA: No relaxation. No itching. No odor. No discharge. No lesions.  URINARY: No incontinence. No nocturia. No frequency. No dysuria.    /70   Ht 5' 1" (1.549 m)   Wt 74.6 kg (164 lb 7.4 oz)   LMP 03/21/2022   BMI 31.08 kg/m²     PE:  APPEARANCE: Well nourished, well developed, in no acute distress.  ABDOMEN: Soft. No tenderness or masses. No hepatosplenomegaly. No hernias.  BREASTS, FUNDOSCOPIC, RECTAL DEFERRED  PELVIC: External female genitalia without lesions.  Female hair distribution. Adequate perineal body, Normal urethral meatus. Vagina moist and well rugated without lesions or discharge.  No significant cystocele or rectocele present. Cervix pink without lesions, discharge or tenderness. Uterus is 14 weeks, irregular, mobile and nontender. Adnexa without masses or tenderness.  EXTREMITIES: No edema      DIAGNOSIS & PLAN  1. Fibroids  norgestimate-ethinyl estradioL (ORTHO-CYCLEN) 0.25-35 mg-mcg per tablet    US Pelvis Comp with Transvag NON-OB (xpd   2. Anemia, unspecified type  Ambulatory referral/consult to Obstetrics / Gynecology   3. Abnormal uterine bleeding  norgestimate-ethinyl estradioL (ORTHO-CYCLEN) 0.25-35 mg-mcg " per tablet    US Pelvis Comp with Transvag NON-OB (xpd    POCT Urine Pregnancy    Specimen to Pathology, Ob/Gyn           COUNSELING:  Patient counseled.  Referred to [facility] for possible myomectomy        30 minutes addressing problems.  This includes face to face time and non-face to face time preparing to see the patient (eg, review of tests), Obtaining and/or reviewing separately obtained history, Documenting clinical information in the electronic or other health record, Independently interpreting resultsand communicating results to the patient/family/caregiver, or Care coordination.

## 2022-04-20 LAB
FINAL PATHOLOGIC DIAGNOSIS: NORMAL
Lab: NORMAL

## 2022-05-06 DIAGNOSIS — R35.0 URINARY FREQUENCY: ICD-10-CM

## 2022-05-06 DIAGNOSIS — R39.15 URINARY URGENCY: ICD-10-CM

## 2022-05-06 RX ORDER — TOLTERODINE 2 MG/1
2 CAPSULE, EXTENDED RELEASE ORAL DAILY
Qty: 30 CAPSULE | Refills: 0 | Status: SHIPPED | OUTPATIENT
Start: 2022-05-06 | End: 2022-07-27

## 2022-05-17 ENCOUNTER — HOSPITAL ENCOUNTER (OUTPATIENT)
Dept: RADIOLOGY | Facility: OTHER | Age: 32
Discharge: HOME | End: 2022-05-17
Attending: OBSTETRICS & GYNECOLOGY
Payer: COMMERCIAL

## 2022-05-17 DIAGNOSIS — N93.9 ABNORMAL UTERINE BLEEDING: ICD-10-CM

## 2022-05-17 DIAGNOSIS — D21.9 FIBROIDS: ICD-10-CM

## 2022-05-17 PROCEDURE — 76830 TRANSVAGINAL US NON-OB: CPT | Mod: 26,,, | Performed by: RADIOLOGY

## 2022-05-17 PROCEDURE — 76830 US PELVIS COMP WITH TRANSVAG NON-OB (XPD): ICD-10-PCS | Mod: 26,,, | Performed by: RADIOLOGY

## 2022-05-17 PROCEDURE — 76856 US PELVIS COMP WITH TRANSVAG NON-OB (XPD): ICD-10-PCS | Mod: 26,,, | Performed by: RADIOLOGY

## 2022-05-17 PROCEDURE — 76830 TRANSVAGINAL US NON-OB: CPT | Mod: TC

## 2022-05-17 PROCEDURE — 76856 US EXAM PELVIC COMPLETE: CPT | Mod: 26,,, | Performed by: RADIOLOGY

## 2022-07-20 ENCOUNTER — HOSPITAL ENCOUNTER (OUTPATIENT)
Dept: RADIOLOGY | Facility: HOSPITAL | Age: 32
Discharge: HOME | End: 2022-07-20
Attending: OBSTETRICS & GYNECOLOGY
Payer: COMMERCIAL

## 2022-07-20 DIAGNOSIS — N93.8 OTHER SPECIFIED ABNORMAL UTERINE AND VAGINAL BLEEDING: ICD-10-CM

## 2022-07-20 DIAGNOSIS — D25.0 FIBROIDS, SUBMUCOSAL: ICD-10-CM

## 2022-07-20 PROCEDURE — 72197 MRI PELVIS W WO CONTRAST: ICD-10-PCS | Mod: 26,,, | Performed by: RADIOLOGY

## 2022-07-20 PROCEDURE — 72197 MRI PELVIS W/O & W/DYE: CPT | Mod: TC

## 2022-07-20 PROCEDURE — 72197 MRI PELVIS W/O & W/DYE: CPT | Mod: 26,,, | Performed by: RADIOLOGY

## 2022-07-20 PROCEDURE — A9585 GADOBUTROL INJECTION: HCPCS | Performed by: OBSTETRICS & GYNECOLOGY

## 2022-07-20 PROCEDURE — 25500020 PHARM REV CODE 255: Performed by: OBSTETRICS & GYNECOLOGY

## 2022-07-20 RX ORDER — GADOBUTROL 604.72 MG/ML
7.5 INJECTION INTRAVENOUS
Status: COMPLETED | OUTPATIENT
Start: 2022-07-20 | End: 2022-07-20

## 2022-07-20 RX ADMIN — GADOBUTROL 7.5 ML: 604.72 INJECTION INTRAVENOUS at 02:07

## 2022-07-27 ENCOUNTER — ANESTHESIA EVENT (OUTPATIENT)
Dept: SURGERY | Facility: OTHER | Age: 32
End: 2022-07-27
Payer: COMMERCIAL

## 2022-07-27 ENCOUNTER — HOSPITAL ENCOUNTER (OUTPATIENT)
Dept: PREADMISSION TESTING | Facility: OTHER | Age: 32
Discharge: HOME | End: 2022-07-27
Attending: OBSTETRICS & GYNECOLOGY
Payer: COMMERCIAL

## 2022-07-27 VITALS
BODY MASS INDEX: 30.96 KG/M2 | WEIGHT: 164 LBS | HEART RATE: 91 BPM | DIASTOLIC BLOOD PRESSURE: 76 MMHG | TEMPERATURE: 98 F | SYSTOLIC BLOOD PRESSURE: 133 MMHG | HEIGHT: 61 IN | RESPIRATION RATE: 16 BRPM | OXYGEN SATURATION: 97 %

## 2022-07-27 DIAGNOSIS — Z01.818 PREOP TESTING: Primary | ICD-10-CM

## 2022-07-27 LAB
ABO + RH BLD: NORMAL
BASOPHILS # BLD AUTO: 0.09 K/UL (ref 0–0.2)
BASOPHILS NFR BLD: 1 % (ref 0–1.9)
BLD GP AB SCN CELLS X3 SERPL QL: NORMAL
DIFFERENTIAL METHOD: ABNORMAL
EOSINOPHIL # BLD AUTO: 0.7 K/UL (ref 0–0.5)
EOSINOPHIL NFR BLD: 7.4 % (ref 0–8)
ERYTHROCYTE [DISTWIDTH] IN BLOOD BY AUTOMATED COUNT: 13.7 % (ref 11.5–14.5)
HCT VFR BLD AUTO: 35.9 % (ref 37–48.5)
HGB BLD-MCNC: 11.1 G/DL (ref 12–16)
IMM GRANULOCYTES # BLD AUTO: 0.03 K/UL (ref 0–0.04)
IMM GRANULOCYTES NFR BLD AUTO: 0.3 % (ref 0–0.5)
LYMPHOCYTES # BLD AUTO: 2.3 K/UL (ref 1–4.8)
LYMPHOCYTES NFR BLD: 24.4 % (ref 18–48)
MCH RBC QN AUTO: 24 PG (ref 27–31)
MCHC RBC AUTO-ENTMCNC: 30.9 G/DL (ref 32–36)
MCV RBC AUTO: 78 FL (ref 82–98)
MONOCYTES # BLD AUTO: 0.5 K/UL (ref 0.3–1)
MONOCYTES NFR BLD: 5.5 % (ref 4–15)
NEUTROPHILS # BLD AUTO: 5.7 K/UL (ref 1.8–7.7)
NEUTROPHILS NFR BLD: 61.4 % (ref 38–73)
NRBC BLD-RTO: 0 /100 WBC
PLATELET # BLD AUTO: 553 K/UL (ref 150–450)
PMV BLD AUTO: 9.7 FL (ref 9.2–12.9)
RBC # BLD AUTO: 4.62 M/UL (ref 4–5.4)
WBC # BLD AUTO: 9.25 K/UL (ref 3.9–12.7)

## 2022-07-27 PROCEDURE — 36415 COLL VENOUS BLD VENIPUNCTURE: CPT | Performed by: ANESTHESIOLOGY

## 2022-07-27 PROCEDURE — 85025 COMPLETE CBC W/AUTO DIFF WBC: CPT | Performed by: ANESTHESIOLOGY

## 2022-07-27 PROCEDURE — 86920 COMPATIBILITY TEST SPIN: CPT | Performed by: ANESTHESIOLOGY

## 2022-07-27 PROCEDURE — 86850 RBC ANTIBODY SCREEN: CPT | Performed by: ANESTHESIOLOGY

## 2022-07-27 RX ORDER — SODIUM CHLORIDE, SODIUM LACTATE, POTASSIUM CHLORIDE, CALCIUM CHLORIDE 600; 310; 30; 20 MG/100ML; MG/100ML; MG/100ML; MG/100ML
INJECTION, SOLUTION INTRAVENOUS CONTINUOUS
Status: CANCELLED | OUTPATIENT
Start: 2022-07-27

## 2022-07-27 RX ORDER — LIDOCAINE HYDROCHLORIDE 10 MG/ML
0.5 INJECTION, SOLUTION EPIDURAL; INFILTRATION; INTRACAUDAL; PERINEURAL ONCE
Status: CANCELLED | OUTPATIENT
Start: 2022-07-27 | End: 2022-07-27

## 2022-07-27 RX ORDER — ACETAMINOPHEN 500 MG
1000 TABLET ORAL
Status: CANCELLED | OUTPATIENT
Start: 2022-07-27 | End: 2022-07-27

## 2022-07-27 NOTE — ANESTHESIA PREPROCEDURE EVALUATION
07/27/2022  Alisha Cardenas is a 31 y.o., female.      Pre-op Assessment    I have reviewed the Patient Summary Reports.     I have reviewed the Nursing Notes. I have reviewed the NPO Status.   I have reviewed the Medications.     Review of Systems  Anesthesia Hx:  Denies Family Hx of Anesthesia complications.   Denies Personal Hx of Anesthesia complications.   Social:  Non-Smoker    Hematology/Oncology:  Hematology Normal   Oncology Normal     EENT/Dental:EENT/Dental Normal   Cardiovascular:  Cardiovascular Normal     Pulmonary:   Asthma    Renal/:  Renal/ Normal     Hepatic/GI:  Hepatic/GI Normal    Musculoskeletal:  Musculoskeletal Normal    Neurological:  Neurology Normal    Endocrine:  Endocrine Normal    Dermatological:  Skin Normal    Psych:  Psychiatric Normal           Physical Exam    Airway:  Mallampati: II       Dental:Pt. With recent tooth abscess in back of mouth. One day left of antibiotics and pt. Reports no symptoms      Anesthesia Plan  Type of Anesthesia, risks & benefits discussed:    Anesthesia Type: Gen ETT  Intra-op Monitoring Plan: Standard ASA Monitors  Post Op Pain Control Plan: multimodal analgesia  Induction:  IV  Airway Plan: Video  Informed Consent: Informed consent signed with the Patient and all parties understand the risks and agree with anesthesia plan.  All questions answered.   ASA Score: 2  Anesthesia Plan Notes: Cbc and type and screen today.  Pt. With admission in March for DUB and given a unit of prbc for anemia    HCT 35.9    Ready For Surgery From Anesthesia Perspective.     .

## 2022-07-27 NOTE — PRE ADMISSION SCREENING
Spoke with Lamar in Dr. Montoya's office notifying of patient reporting tooth abscess from root canal. PCN started 7/18/2022. Completion date 7/28/2022. Pt asymptomatic. Pt instructed to call Dr. Montoya's office as well. Also notified that pt needs orders, op consent, H&P for surgery on 8/5/2022. Understanding verbalized.

## 2022-07-27 NOTE — DISCHARGE INSTRUCTIONS
Information to Prepare you for your Surgery    PRE-ADMIT TESTING -  [phone]    [address] [facility]          Your surgery has been scheduled at [facility]. We are pleased to have the opportunity to serve you. For Further Information please call [phone].    On the day of surgery please report to the Information Desk on the 1st floor.    CONTACT YOUR PHYSICIAN'S OFFICE THE DAY PRIOR TO YOUR SURGERY TO OBTAIN YOUR ARRIVAL TIME.     The evening before surgery do not eat anything after 9 p.m. ( this includes hard candy, chewing gum and mints).  You may only have GATORADE, POWERADE AND WATER  from 9 p.m. until you leave your home.   DO NOT DRINK ANY LIQUIDS ON THE WAY TO THE HOSPITAL.      Why does your anesthesiologist allow you to drink Gatorade/Powerade before surgery?  Gatorade/Powerade helps to increase your comfort before surgery and to decrease your nausea after surgery. The carbohydrates in Gatorade/Powerade help reduce your body's stress response to surgery.  If you are a diabetic-drink only water prior to surgery.      Current Visitor policy(12/27/2021) - Patients may have 2 visitors pre and post procedure. Only 2 visitors will be allowed in the Surgical building with the patient.     SPECIAL MEDICATION INSTRUCTIONS: TAKE medications checked off by the Anesthesiologist on your Medication List.    Surgery Patients:  If you take ASPIRIN - Your PHYSICIAN/SURGEON will need to inform you IF/OR when you need to stop taking aspirin prior to your surgery.     Do Not take any medications containing IBUPROFEN.    Do Not Wear any make-up (especially eye make-up) to surgery. Please remove any false eyelashes or eyelash extensions. If you arrive the day of surgery with makeup/eyelashes on you will be required to remove prior to surgery. (There is a risk of corneal abrasions if eye makeup/eyelash extensions are not removed)      Leave all valuables at home.    Do Not wear any jewelry or watches, including any metal in body piercings. Jewelry must be removed prior to coming to the hospital.  There is a possibility that rings that are unable to be removed may be cut off if they are on the surgical extremity.    Please remove all hair extensions, wigs, clips and any other metal accessories/ ornaments from your hair.  These items may pose a flammable/fire risk in Surgery and must be removed.    Do not shave your surgical area at least 5 days prior to your surgery. The surgical prep will be performed at the hospital according to Infection Control regulations.    Contact Lens must be removed before surgery. Either do not wear the contact lens or bring a case and solution for storage.  Please bring a container for eyeglasses or dentures as required.  Bring any paperwork your physician has provided, such as consent forms,  history and physicals, doctor's orders, etc.   Bring comfortable clothes that are loose fitting to wear upon discharge. Take into consideration the type of surgery being performed.  Maintain your diet as advised per your physician the day prior to surgery.      Adequate rest the night before surgery is advised.   Park in the Parking lot behind the hospital or in the [employer] [facility] across the street from the parking lot. Parking is complimentary.  If you will be discharged the same day as your procedure, please arrange for a responsible adult to drive you home or to accompany you if traveling by taxi.   YOU WILL NOT BE PERMITTED TO DRIVE OR TO LEAVE THE HOSPITAL ALONE AFTER SURGERY.   If you are being discharged the same day, it is strongly recommended that you arrange for someone to remain with you for the first 24 hrs following your surgery.    The Surgeon will speak to your family/visitor after your surgery regarding the outcome of your surgery and post op care.  The Surgeon may speak to you after your surgery, but there is a possibility you may  not remember the details.  Please check with your family members regarding the conversation with the Surgeon.    We strongly recommend whoever is bringing you home be present for discharge instructions.  This will ensure a thorough understanding for your post op home care.    ALL CHILDREN MUST ALWAYS BE ACCOMPANIED BY AN ADULT.    Visitors-Refer to current Visitor policy handouts.    Thank you for your cooperation.  The Staff of [facility].            Bathing Instructions with Hibiclens    Shower the evening before and morning of your procedure with Hibiclens:  Wash your face with water and your regular face wash/soap  Apply Hibiclens directly on your skin or on a wet washcloth and wash gently. When showering: Move away from the shower stream when applying Hibiclens to avoid rinsing off too soon.  Rinse thoroughly with warm water  Do not dilute Hibiclens        Dry off as usual, do not use any deodorant, powder, body lotions, perfume, after shave or cologne.

## 2022-08-01 NOTE — H&P
History & Physical    SUBJECTIVE:     History of Present Illness:  Patient is a 31 y.o. female presents with symptomatic uterine fibroids and recurrent pregnancy loss    No chief complaint on file.      Review of patient's allergies indicates:   Allergen Reactions    Shellfish containing products Anaphylaxis       No current facility-administered medications for this encounter.     Current Outpatient Medications   Medication Sig Dispense Refill    albuterol 90 mcg/actuation inhaler Inhale 1-2 puffs into the lungs every 6 (six) hours as needed for Wheezing. Rescue 1 Inhaler 0    cetirizine HCl (ZYRTEC ORAL) Take by mouth once daily at 6am.      ferrous sulfate (FEOSOL) 325 mg (65 mg iron) Tab tablet Take 1 tablet (325 mg total) by mouth 2 (two) times daily. (Patient taking differently: Take 325 mg by mouth once daily.) 60 tablet 1    fluticasone propionate (FLONASE) 50 mcg/actuation nasal spray 2 sprays (100 mcg total) by Each Nostril route once daily. 9.9 mL 0    multivit,calc,mins/iron/folic (WOMEN'S ONE DAILY ORAL) Take by mouth once daily.      norgestimate-ethinyl estradioL (ORTHO-CYCLEN) 0.25-35 mg-mcg per tablet Take 1 tablet by mouth once daily. 90 tablet 3    UNABLE TO FIND Take by mouth 3 (three) times daily. Penicillin for tooth abscess  Start date 2022  Completion Date 2022         Past Medical History:   Diagnosis Date    Asthma     reports as a child    Astigmatism of left eye     Complete spontaneous  2014 9:22:39 AM    [facility]: , spontaneous, complete-No Additional Notes    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 2014 9:22:57 AM    [facility]: , habitual w/o pregnancy-No Additional Notes    Missed  2014 9:22:49 AM    [facility]: , Missed, Less than 22 Weeks-No Additional Notes    Prior miscarriage with pregnancy in first trimester, antepartum     Prior  miscarriage with pregnancy in first trimester, antepartum     Prior miscarriage with pregnancy in second trimester, antepartum      Past Surgical History:   Procedure Laterality Date    DILATION AND CURETTAGE OF UTERUS      none      TONSILLECTOMY       Family History   Problem Relation Age of Onset    No Known Problems Mother     No Known Problems Father     Vaginal cancer Neg Hx     Endometrial cancer Neg Hx     Cervical cancer Neg Hx      Social History     Tobacco Use    Smoking status: Never Smoker    Smokeless tobacco: Never Used   Substance Use Topics    Alcohol use: Yes     Comment: weekends    Drug use: No        Review of Systems:       OBJECTIVE:     Vital Signs (Most Recent)      stable        Physical Exam:   large, fibroid uterus    Laboratory  hgb 22 11.1    Diagnostic Results:     MRI pelvis:  The uterus measures 8 x 7 x 8 cm and there are multiple uterine leiomyomas too numerous to count.  These are small medium and large the largest of which is central measuring 5 cm.  These are mostly intramural, subserosal, and submucosal.  There are no pedunculated fibroids.  There is a left ovarian cyst measuring 3 cm.  There are physiologic bilateral ovarian cysts.  No adenopathy is seen.  The endometrium is normal.  No definite uterine anomalies are detected.  No adenopathy is seen.  The bones are unremarkable.    ASSESSMENT/PLAN:     32yo  with uterine fibroids and hx of anemia    PLAN:  Preop TAP block  Abdominal myomectomy  Preop CBC and T/S if not done at [facility] appt

## 2022-08-04 ENCOUNTER — TELEPHONE (OUTPATIENT)
Dept: OBSTETRICS AND GYNECOLOGY | Facility: CLINIC | Age: 32
End: 2022-08-04
Payer: COMMERCIAL

## 2022-08-04 ENCOUNTER — PATIENT MESSAGE (OUTPATIENT)
Dept: OBSTETRICS AND GYNECOLOGY | Facility: CLINIC | Age: 32
End: 2022-08-04
Payer: COMMERCIAL

## 2022-08-05 ENCOUNTER — HOSPITAL ENCOUNTER (OUTPATIENT)
Facility: OTHER | Age: 32
LOS: 1 days | Discharge: HOME | End: 2022-08-08
Attending: OBSTETRICS & GYNECOLOGY | Admitting: OBSTETRICS & GYNECOLOGY
Payer: COMMERCIAL

## 2022-08-05 ENCOUNTER — ANESTHESIA (OUTPATIENT)
Dept: SURGERY | Facility: OTHER | Age: 32
End: 2022-08-05
Payer: COMMERCIAL

## 2022-08-05 DIAGNOSIS — Z98.890 S/P MYOMECTOMY: Primary | ICD-10-CM

## 2022-08-05 DIAGNOSIS — D25.9 UTERINE FIBROID: ICD-10-CM

## 2022-08-05 DIAGNOSIS — Z98.890 S/P MYOMECTOMY: ICD-10-CM

## 2022-08-05 LAB
B-HCG UR QL: NEGATIVE
CTP QC/QA: YES
HCT VFR BLD AUTO: 34.7 % (ref 37–48.5)
HGB BLD-MCNC: 10.6 G/DL (ref 12–16)
POCT GLUCOSE: 80 MG/DL (ref 70–110)

## 2022-08-05 PROCEDURE — 37000008 HC ANESTHESIA 1ST 15 MINUTES: Performed by: OBSTETRICS & GYNECOLOGY

## 2022-08-05 PROCEDURE — 63600175 PHARM REV CODE 636 W HCPCS: Performed by: ANESTHESIOLOGY

## 2022-08-05 PROCEDURE — 25000003 PHARM REV CODE 250: Performed by: NURSE ANESTHETIST, CERTIFIED REGISTERED

## 2022-08-05 PROCEDURE — 94799 UNLISTED PULMONARY SVC/PX: CPT

## 2022-08-05 PROCEDURE — 63600175 PHARM REV CODE 636 W HCPCS: Performed by: NURSE ANESTHETIST, CERTIFIED REGISTERED

## 2022-08-05 PROCEDURE — 25000003 PHARM REV CODE 250: Performed by: OBSTETRICS & GYNECOLOGY

## 2022-08-05 PROCEDURE — 27201423 OPTIME MED/SURG SUP & DEVICES STERILE SUPPLY: Performed by: OBSTETRICS & GYNECOLOGY

## 2022-08-05 PROCEDURE — C1729 CATH, DRAINAGE: HCPCS | Performed by: OBSTETRICS & GYNECOLOGY

## 2022-08-05 PROCEDURE — P9045 ALBUMIN (HUMAN), 5%, 250 ML: HCPCS | Mod: JG | Performed by: NURSE ANESTHETIST, CERTIFIED REGISTERED

## 2022-08-05 PROCEDURE — 71000039 HC RECOVERY, EACH ADD'L HOUR: Performed by: OBSTETRICS & GYNECOLOGY

## 2022-08-05 PROCEDURE — 63600175 PHARM REV CODE 636 W HCPCS

## 2022-08-05 PROCEDURE — C9290 INJ, BUPIVACAINE LIPOSOME: HCPCS | Performed by: ANESTHESIOLOGY

## 2022-08-05 PROCEDURE — G0378 HOSPITAL OBSERVATION PER HR: HCPCS

## 2022-08-05 PROCEDURE — 88305 TISSUE EXAM BY PATHOLOGIST: ICD-10-PCS | Mod: 26,,, | Performed by: PATHOLOGY

## 2022-08-05 PROCEDURE — 25000003 PHARM REV CODE 250: Performed by: STUDENT IN AN ORGANIZED HEALTH CARE EDUCATION/TRAINING PROGRAM

## 2022-08-05 PROCEDURE — 71000033 HC RECOVERY, INTIAL HOUR: Performed by: OBSTETRICS & GYNECOLOGY

## 2022-08-05 PROCEDURE — 36000706: Performed by: OBSTETRICS & GYNECOLOGY

## 2022-08-05 PROCEDURE — 85018 HEMOGLOBIN: CPT | Performed by: ANESTHESIOLOGY

## 2022-08-05 PROCEDURE — 25000003 PHARM REV CODE 250: Performed by: ANESTHESIOLOGY

## 2022-08-05 PROCEDURE — 88305 TISSUE EXAM BY PATHOLOGIST: CPT | Mod: 26,,, | Performed by: PATHOLOGY

## 2022-08-05 PROCEDURE — 63600175 PHARM REV CODE 636 W HCPCS: Performed by: STUDENT IN AN ORGANIZED HEALTH CARE EDUCATION/TRAINING PROGRAM

## 2022-08-05 PROCEDURE — 81025 URINE PREGNANCY TEST: CPT | Performed by: ANESTHESIOLOGY

## 2022-08-05 PROCEDURE — 88305 TISSUE EXAM BY PATHOLOGIST: CPT | Performed by: PATHOLOGY

## 2022-08-05 PROCEDURE — 63600175 PHARM REV CODE 636 W HCPCS: Performed by: OBSTETRICS & GYNECOLOGY

## 2022-08-05 PROCEDURE — 99900035 HC TECH TIME PER 15 MIN (STAT)

## 2022-08-05 PROCEDURE — 94761 N-INVAS EAR/PLS OXIMETRY MLT: CPT

## 2022-08-05 PROCEDURE — 85014 HEMATOCRIT: CPT | Performed by: ANESTHESIOLOGY

## 2022-08-05 PROCEDURE — 82962 GLUCOSE BLOOD TEST: CPT | Performed by: OBSTETRICS & GYNECOLOGY

## 2022-08-05 PROCEDURE — 64488 TAP BLOCK BI INJECTION: CPT | Performed by: ANESTHESIOLOGY

## 2022-08-05 PROCEDURE — 36000707: Performed by: OBSTETRICS & GYNECOLOGY

## 2022-08-05 PROCEDURE — 37000009 HC ANESTHESIA EA ADD 15 MINS: Performed by: OBSTETRICS & GYNECOLOGY

## 2022-08-05 PROCEDURE — 96374 THER/PROPH/DIAG INJ IV PUSH: CPT | Mod: 59

## 2022-08-05 RX ORDER — SIMETHICONE 80 MG
80 TABLET,CHEWABLE ORAL EVERY 4 HOURS PRN
Status: DISCONTINUED | OUTPATIENT
Start: 2022-08-05 | End: 2022-08-08 | Stop reason: HOSPADM

## 2022-08-05 RX ORDER — ALBUTEROL SULFATE 90 UG/1
2 AEROSOL, METERED RESPIRATORY (INHALATION) EVERY 6 HOURS PRN
Status: DISCONTINUED | OUTPATIENT
Start: 2022-08-05 | End: 2022-08-08 | Stop reason: HOSPADM

## 2022-08-05 RX ORDER — SODIUM CHLORIDE, SODIUM LACTATE, POTASSIUM CHLORIDE, CALCIUM CHLORIDE 600; 310; 30; 20 MG/100ML; MG/100ML; MG/100ML; MG/100ML
INJECTION, SOLUTION INTRAVENOUS CONTINUOUS
Status: DISCONTINUED | OUTPATIENT
Start: 2022-08-05 | End: 2022-08-08 | Stop reason: HOSPADM

## 2022-08-05 RX ORDER — MISOPROSTOL 200 UG/1
TABLET ORAL
Status: DISCONTINUED | OUTPATIENT
Start: 2022-08-05 | End: 2022-08-05 | Stop reason: HOSPADM

## 2022-08-05 RX ORDER — ONDANSETRON 8 MG/1
8 TABLET, ORALLY DISINTEGRATING ORAL EVERY 8 HOURS PRN
Status: DISCONTINUED | OUTPATIENT
Start: 2022-08-05 | End: 2022-08-08 | Stop reason: HOSPADM

## 2022-08-05 RX ORDER — BUPIVACAINE HYDROCHLORIDE 2.5 MG/ML
INJECTION, SOLUTION EPIDURAL; INFILTRATION; INTRACAUDAL
Status: COMPLETED | OUTPATIENT
Start: 2022-08-05 | End: 2022-08-05

## 2022-08-05 RX ORDER — HYDROCODONE BITARTRATE AND ACETAMINOPHEN 5; 325 MG/1; MG/1
1 TABLET ORAL EVERY 4 HOURS PRN
Status: DISCONTINUED | OUTPATIENT
Start: 2022-08-05 | End: 2022-08-08 | Stop reason: HOSPADM

## 2022-08-05 RX ORDER — LIDOCAINE HYDROCHLORIDE 10 MG/ML
0.5 INJECTION, SOLUTION EPIDURAL; INFILTRATION; INTRACAUDAL; PERINEURAL ONCE
Status: DISCONTINUED | OUTPATIENT
Start: 2022-08-05 | End: 2022-08-05 | Stop reason: HOSPADM

## 2022-08-05 RX ORDER — IBUPROFEN 400 MG/1
400 TABLET ORAL EVERY 6 HOURS
Status: DISCONTINUED | OUTPATIENT
Start: 2022-08-05 | End: 2022-08-06

## 2022-08-05 RX ORDER — ACETAMINOPHEN 500 MG
1000 TABLET ORAL
Status: DISCONTINUED | OUTPATIENT
Start: 2022-08-05 | End: 2022-08-08 | Stop reason: HOSPADM

## 2022-08-05 RX ORDER — FENTANYL CITRATE 50 UG/ML
INJECTION, SOLUTION INTRAMUSCULAR; INTRAVENOUS
Status: DISCONTINUED | OUTPATIENT
Start: 2022-08-05 | End: 2022-08-05

## 2022-08-05 RX ORDER — SODIUM CHLORIDE 0.9 % (FLUSH) 0.9 %
3 SYRINGE (ML) INJECTION
Status: DISCONTINUED | OUTPATIENT
Start: 2022-08-05 | End: 2022-08-08 | Stop reason: HOSPADM

## 2022-08-05 RX ORDER — ACETAMINOPHEN 500 MG
1000 TABLET ORAL
Status: COMPLETED | OUTPATIENT
Start: 2022-08-05 | End: 2022-08-05

## 2022-08-05 RX ORDER — LIDOCAINE HYDROCHLORIDE 20 MG/ML
INJECTION INTRAVENOUS
Status: DISCONTINUED | OUTPATIENT
Start: 2022-08-05 | End: 2022-08-05

## 2022-08-05 RX ORDER — HYDROMORPHONE HYDROCHLORIDE 2 MG/ML
0.4 INJECTION, SOLUTION INTRAMUSCULAR; INTRAVENOUS; SUBCUTANEOUS EVERY 5 MIN PRN
Status: DISCONTINUED | OUTPATIENT
Start: 2022-08-05 | End: 2022-08-05 | Stop reason: HOSPADM

## 2022-08-05 RX ORDER — MIDAZOLAM HYDROCHLORIDE 1 MG/ML
INJECTION INTRAMUSCULAR; INTRAVENOUS
Status: DISCONTINUED | OUTPATIENT
Start: 2022-08-05 | End: 2022-08-05

## 2022-08-05 RX ORDER — ALBUMIN HUMAN 50 G/1000ML
SOLUTION INTRAVENOUS CONTINUOUS PRN
Status: DISCONTINUED | OUTPATIENT
Start: 2022-08-05 | End: 2022-08-05

## 2022-08-05 RX ORDER — PROCHLORPERAZINE EDISYLATE 5 MG/ML
5 INJECTION INTRAMUSCULAR; INTRAVENOUS EVERY 6 HOURS PRN
Status: DISCONTINUED | OUTPATIENT
Start: 2022-08-05 | End: 2022-08-08 | Stop reason: HOSPADM

## 2022-08-05 RX ORDER — MUPIROCIN 20 MG/G
OINTMENT TOPICAL
Status: DISCONTINUED | OUTPATIENT
Start: 2022-08-05 | End: 2022-08-06

## 2022-08-05 RX ORDER — PROPOFOL 10 MG/ML
VIAL (ML) INTRAVENOUS
Status: DISCONTINUED | OUTPATIENT
Start: 2022-08-05 | End: 2022-08-05

## 2022-08-05 RX ORDER — HYDROMORPHONE HYDROCHLORIDE 2 MG/ML
1 INJECTION, SOLUTION INTRAMUSCULAR; INTRAVENOUS; SUBCUTANEOUS EVERY 6 HOURS PRN
Status: DISCONTINUED | OUTPATIENT
Start: 2022-08-05 | End: 2022-08-08 | Stop reason: HOSPADM

## 2022-08-05 RX ORDER — SODIUM CHLORIDE 9 MG/ML
INJECTION, SOLUTION INTRAVENOUS CONTINUOUS
Status: DISCONTINUED | OUTPATIENT
Start: 2022-08-05 | End: 2022-08-06

## 2022-08-05 RX ORDER — MEPERIDINE HYDROCHLORIDE 25 MG/ML
12.5 INJECTION INTRAMUSCULAR; INTRAVENOUS; SUBCUTANEOUS ONCE AS NEEDED
Status: DISCONTINUED | OUTPATIENT
Start: 2022-08-05 | End: 2022-08-05 | Stop reason: HOSPADM

## 2022-08-05 RX ORDER — HYDROCODONE BITARTRATE AND ACETAMINOPHEN 500; 5 MG/1; MG/1
TABLET ORAL
Status: DISCONTINUED | OUTPATIENT
Start: 2022-08-05 | End: 2022-08-07

## 2022-08-05 RX ORDER — VASOPRESSIN 20 [USP'U]/ML
INJECTION, SOLUTION INTRAMUSCULAR; SUBCUTANEOUS
Status: DISCONTINUED | OUTPATIENT
Start: 2022-08-05 | End: 2022-08-05 | Stop reason: HOSPADM

## 2022-08-05 RX ORDER — MUPIROCIN 20 MG/G
1 OINTMENT TOPICAL 2 TIMES DAILY
Status: DISCONTINUED | OUTPATIENT
Start: 2022-08-05 | End: 2022-08-08 | Stop reason: HOSPADM

## 2022-08-05 RX ORDER — ONDANSETRON 2 MG/ML
INJECTION INTRAMUSCULAR; INTRAVENOUS
Status: DISCONTINUED | OUTPATIENT
Start: 2022-08-05 | End: 2022-08-05

## 2022-08-05 RX ORDER — OXYCODONE HYDROCHLORIDE 5 MG/1
5 TABLET ORAL
Status: DISCONTINUED | OUTPATIENT
Start: 2022-08-05 | End: 2022-08-05 | Stop reason: HOSPADM

## 2022-08-05 RX ORDER — ROCURONIUM BROMIDE 10 MG/ML
INJECTION, SOLUTION INTRAVENOUS
Status: DISCONTINUED | OUTPATIENT
Start: 2022-08-05 | End: 2022-08-05

## 2022-08-05 RX ORDER — CEFAZOLIN SODIUM 1 G/3ML
2 INJECTION, POWDER, FOR SOLUTION INTRAMUSCULAR; INTRAVENOUS
Status: COMPLETED | OUTPATIENT
Start: 2022-08-05 | End: 2022-08-05

## 2022-08-05 RX ORDER — OXYCODONE AND ACETAMINOPHEN 10; 325 MG/1; MG/1
1 TABLET ORAL EVERY 4 HOURS PRN
Status: DISCONTINUED | OUTPATIENT
Start: 2022-08-05 | End: 2022-08-08 | Stop reason: HOSPADM

## 2022-08-05 RX ORDER — ONDANSETRON 2 MG/ML
4 INJECTION INTRAMUSCULAR; INTRAVENOUS DAILY PRN
Status: DISCONTINUED | OUTPATIENT
Start: 2022-08-05 | End: 2022-08-05 | Stop reason: HOSPADM

## 2022-08-05 RX ORDER — DIPHENHYDRAMINE HCL 25 MG
25 CAPSULE ORAL EVERY 4 HOURS PRN
Status: DISCONTINUED | OUTPATIENT
Start: 2022-08-05 | End: 2022-08-08 | Stop reason: HOSPADM

## 2022-08-05 RX ADMIN — MIDAZOLAM HYDROCHLORIDE 2 MG: 1 INJECTION, SOLUTION INTRAMUSCULAR; INTRAVENOUS at 11:08

## 2022-08-05 RX ADMIN — BUPIVACAINE 20 MG: 13.3 INJECTION, SUSPENSION, LIPOSOMAL INFILTRATION at 11:08

## 2022-08-05 RX ADMIN — MUPIROCIN: 20 OINTMENT TOPICAL at 10:08

## 2022-08-05 RX ADMIN — ROCURONIUM BROMIDE 10 MG: 10 SOLUTION INTRAVENOUS at 02:08

## 2022-08-05 RX ADMIN — FENTANYL CITRATE 50 MCG: 50 INJECTION, SOLUTION INTRAMUSCULAR; INTRAVENOUS at 02:08

## 2022-08-05 RX ADMIN — FENTANYL CITRATE 100 MCG: 50 INJECTION, SOLUTION INTRAMUSCULAR; INTRAVENOUS at 11:08

## 2022-08-05 RX ADMIN — ALBUMIN (HUMAN): 12.5 SOLUTION INTRAVENOUS at 02:08

## 2022-08-05 RX ADMIN — HYDROMORPHONE HYDROCHLORIDE 0.4 MG: 2 INJECTION INTRAMUSCULAR; INTRAVENOUS; SUBCUTANEOUS at 04:08

## 2022-08-05 RX ADMIN — HYDROMORPHONE HYDROCHLORIDE 0.4 MG: 2 INJECTION INTRAMUSCULAR; INTRAVENOUS; SUBCUTANEOUS at 03:08

## 2022-08-05 RX ADMIN — FENTANYL CITRATE 50 MCG: 50 INJECTION, SOLUTION INTRAMUSCULAR; INTRAVENOUS at 03:08

## 2022-08-05 RX ADMIN — SODIUM CHLORIDE, SODIUM LACTATE, POTASSIUM CHLORIDE, AND CALCIUM CHLORIDE: .6; .31; .03; .02 INJECTION, SOLUTION INTRAVENOUS at 01:08

## 2022-08-05 RX ADMIN — OXYCODONE 5 MG: 5 TABLET ORAL at 04:08

## 2022-08-05 RX ADMIN — FENTANYL CITRATE 100 MCG: 50 INJECTION, SOLUTION INTRAMUSCULAR; INTRAVENOUS at 12:08

## 2022-08-05 RX ADMIN — ONDANSETRON HYDROCHLORIDE 4 MG: 2 INJECTION INTRAMUSCULAR; INTRAVENOUS at 03:08

## 2022-08-05 RX ADMIN — SODIUM CHLORIDE, SODIUM LACTATE, POTASSIUM CHLORIDE, AND CALCIUM CHLORIDE: .6; .31; .03; .02 INJECTION, SOLUTION INTRAVENOUS at 11:08

## 2022-08-05 RX ADMIN — ACETAMINOPHEN 1000 MG: 500 TABLET, FILM COATED ORAL at 10:08

## 2022-08-05 RX ADMIN — IBUPROFEN 400 MG: 400 TABLET, FILM COATED ORAL at 06:08

## 2022-08-05 RX ADMIN — CEFAZOLIN 2 G: 330 INJECTION, POWDER, FOR SOLUTION INTRAMUSCULAR; INTRAVENOUS at 12:08

## 2022-08-05 RX ADMIN — PROPOFOL 200 MG: 10 INJECTION, EMULSION INTRAVENOUS at 12:08

## 2022-08-05 RX ADMIN — BUPIVACAINE HYDROCHLORIDE 40 ML: 2.5 INJECTION, SOLUTION EPIDURAL; INFILTRATION; INTRACAUDAL; PERINEURAL at 11:08

## 2022-08-05 RX ADMIN — CARBOXYMETHYLCELLULOSE SODIUM 2 DROP: 2.5 SOLUTION/ DROPS OPHTHALMIC at 12:08

## 2022-08-05 RX ADMIN — ROCURONIUM BROMIDE 40 MG: 10 SOLUTION INTRAVENOUS at 12:08

## 2022-08-05 RX ADMIN — OXYCODONE AND ACETAMINOPHEN 1 TABLET: 10; 325 TABLET ORAL at 10:08

## 2022-08-05 RX ADMIN — HYDROMORPHONE HYDROCHLORIDE 1 MG: 2 INJECTION INTRAMUSCULAR; INTRAVENOUS; SUBCUTANEOUS at 06:08

## 2022-08-05 RX ADMIN — MUPIROCIN 1 G: 20 OINTMENT TOPICAL at 09:08

## 2022-08-05 RX ADMIN — LIDOCAINE HYDROCHLORIDE 50 MG: 20 INJECTION, SOLUTION INTRAVENOUS at 12:08

## 2022-08-05 NOTE — ANESTHESIA PROCEDURE NOTES
Peripheral Block    Patient location during procedure: holding area   Block not for primary anesthetic.  Reason for block: at surgeon's request and post-op pain management   Post-op Pain Location: myomectomy   Timeout: 8/5/2022 11:38 AM   End time: 8/5/2022 11:49 AM    Staffing  Authorizing Provider: Haresh Vega MD  Performing Provider: Haresh Vega MD    Preanesthetic Checklist  Completed: patient identified, IV checked, site marked, risks and benefits discussed, surgical consent, monitors and equipment checked, pre-op evaluation and timeout performed  Peripheral Block  Patient position: supine  Prep: ChloraPrep  Patient monitoring: heart rate, cardiac monitor, continuous pulse ox and frequent blood pressure checks  Block type: TAP  Laterality: bilateral  Injection technique: single shot  Needle  Needle gauge: 22 G  Needle length: 3.5 in  Needle localization: ultrasound guidance   -ultrasound image captured on disc.  Assessment  Injection assessment: negative parasthesia, negative aspiration and local visualized surrounding nerve  Paresthesia pain: none  Heart rate change: no  Slow fractionated injection: yes  Pain Tolerance: comfortable throughout block and no complaints  Medications:    Medications: bupivacaine (pf) (MARCAINE) injection 0.25% - Perineural   40 mL - 8/5/2022 11:50:00 AM    Additional Notes  Bilateral TAP with bupivacaine 0.25% 20 cc plus Exparel 10cc to each

## 2022-08-05 NOTE — PLAN OF CARE
Denied current PCP  Pharmacy [facility] [place]  Mother will provide assist needed during recovery & transportation home        08/05/22 1811   Discharge Assessment   Assessment Type Discharge Planning Assessment   Confirmed/corrected address, phone number and insurance Yes   Confirmed Demographics   (corrected in EPIC)   Source of Information family;patient   Lives With alone   Do you expect to return to your current living situation? Yes   Do you have help at home or someone to help you manage your care at home? Yes   Prior to hospitilization cognitive status: Alert/Oriented   Current cognitive status: Alert/Oriented   Walking or Climbing Stairs Difficulty none   Dressing/Bathing Difficulty none   Equipment Currently Used at Home none   Do you currently have service(s) that help you manage your care at home? No   Do you take prescription medications? Yes   Do you have prescription coverage? Yes   Do you have any problems affording any of your prescribed medications? No   Is the patient taking medications as prescribed? yes   Who is going to help you get home at discharge? mother   How do you get to doctors appointments? car, drives self   Discharge Plan A Home

## 2022-08-05 NOTE — ANESTHESIA POSTPROCEDURE EVALUATION
Anesthesia Post Evaluation    Patient: Alisha Cardenas    Procedure(s) Performed: Procedure(s) (LRB):  MYOMECTOMY (N/A)    Final Anesthesia Type: general      Patient location during evaluation: PACU  Patient participation: Yes- Able to Participate  Level of consciousness: awake and alert  Post-procedure vital signs: reviewed and stable  Pain management: adequate  Airway patency: patent    PONV status at discharge: No PONV  Anesthetic complications: no      Cardiovascular status: blood pressure returned to baseline  Respiratory status: unassisted and spontaneous ventilation  Hydration status: euvolemic  Follow-up not needed.          Vitals Value Taken Time   /69 08/05/22 1635   Temp 36.4 °C (97.6 °F) 08/05/22 1533   Pulse 97 08/05/22 1639   Resp 16 08/05/22 1605   SpO2 98 % 08/05/22 1639   Vitals shown include unvalidated device data.      No case tracking events are documented in the log.      Pain/Rimma Score: Pain Rating Prior to Med Admin: 9 (8/5/2022  4:18 PM)  Pain Rating Post Med Admin: 7 (8/5/2022  4:29 PM)  Rimma Score: 8 (8/5/2022  4:03 PM)

## 2022-08-05 NOTE — NURSING
Received patient to room 380 from Recovery. Pt AAO x 4. Resp. Even and unlabored. Dsg to abdomen CDI. F/C patent and draining clear yellow urine to g/u bag. Pt c/o abdominal pain @ 10. Will administer pain medication as ordered. Family @ bedside. Call light in reach. VSS.

## 2022-08-05 NOTE — TRANSFER OF CARE
"Anesthesia Transfer of Care Note    Patient: Alisha Cardenas    Procedure(s) Performed: Procedure(s) (LRB):  MYOMECTOMY (N/A)    Patient location: PACU    Anesthesia Type: general    Transport from OR: Transported from OR on 2-3 L/min O2 by NC with adequate spontaneous ventilation    Post pain: adequate analgesia    Post assessment: no apparent anesthetic complications    Post vital signs: stable    Level of consciousness: awake and alert    Nausea/Vomiting: no nausea/vomiting    Complications: none    Transfer of care protocol was followed      Last vitals:   Visit Vitals  BP (!) 141/88 (BP Location: Right arm, Patient Position: Sitting)   Pulse 84   Temp 36.6 °C (97.8 °F) (Oral)   Resp 18   Ht 5' 1" (1.549 m)   Wt 74.4 kg (164 lb)   LMP 08/01/2022 (Exact Date)   SpO2 96%   Breastfeeding No   BMI 30.99 kg/m²     "

## 2022-08-05 NOTE — PLAN OF CARE
08/05/22 1813   Physical Activity   On average, how many days per week do you engage in moderate to strenuous exercise (like a brisk walk)? 1 day   On average, how many minutes do you engage in exercise at this level? 30 min   Financial Resource Strain   How hard is it for you to pay for the very basics like food, housing, medical care, and heating? Not hard   Housing Stability   In the last 12 months, was there a time when you were not able to pay the mortgage or rent on time? N   In the last 12 months, was there a time when you did not have a steady place to sleep or slept in a shelter (including now)? N   Transportation Needs   In the past 12 months, has lack of transportation kept you from medical appointments or from getting medications? no   In the past 12 months, has lack of transportation kept you from meetings, work, or from getting things needed for daily living? No   Food Insecurity   Within the past 12 months, you worried that your food would run out before you got the money to buy more. Never true   Within the past 12 months, the food you bought just didn't last and you didn't have money to get more. Never true   Stress   Do you feel stress - tense, restless, nervous, or anxious, or unable to sleep at night because your mind is troubled all the time - these days? Not at all   Social Connections   In a typical week, how many times do you talk on the phone with family, friends, or neighbors? Twice a week   How often do you get together with friends or relatives? Once   How often do you attend [employer] or [employer] services? Never   Do you belong to any clubs or organizations such as [employer]oration groups, unions, fraternal or athletic groups, or school groups? No   How often do you attend meetings of the clubs or organizations you belong to? Never   Are you , , , , never , or living with a partner? Never marrie   Alcohol Use   Q1: How often do you have a drink containing  alcohol? Monthly or l   Q2: How many drinks containing alcohol do you have on a typical day when you are drinking? 1 or 2   Q3: How often do you have six or more drinks on one occasion? Never

## 2022-08-05 NOTE — OP NOTE
[facility])  General Surgery  Operative Note    SUMMARY     Date of Procedure: 8/5/2022     Procedure: Procedure(s) (LRB):  MYOMECTOMY (N/A)       Surgeon(s) and Role:     * Renzo Montoya III, MD - Primary    Assisting Surgeon:Shoshana Watson, PGY3 and Beverly HamiltonPGY3    Pre-Operative Diagnosis: Submucous uterine fibroid [D25.0]    Post-Operative Diagnosis: Post-Op Diagnosis Codes:     * Submucous uterine fibroid [D25.0]    Anesthesia: General    Operative Findings (including complications, if any): mutlifibroid uterus, normal ovaries and tubes    Description of Technical Procedures:  Patient was taken to the operating room and after surgical time-out was performed and adequate anesthesia was obtained the patient was prepped and draped in the dorsal supine position.  A Choi catheter was placed into the vagina.  400 mcg of misoprostol was placed into the rectum.  He a Pfannenstiel skin incision was made with a scalpel and carried down to the underlying layer of the fascia with the Bovie.  The fascia was then incised with Cowart scissors.  The anterior lip of the fascia was grasped with Kocher's and the muscle was dissected bluntly.  The peritoneum was entered bluntly in the midline.  The peritoneal incision was then extended with stretching and Bovie cautery.  Attention was paid to the bowel.  It was packed away with wet sponges.  At this time multiple fibroids were resected from the uterus in Situ.  The uterus was then exteriorized and the remaining fibroids were removed.  In total 12 fibroids were removed.  The myometrial incisions were closed with 2-0 Vicryl in a horizontal mattress fashion.  The serosa was closed with a baseball stitch utilizing 2-0 Vicryl.  Prior to each incision, the serosa was injected with a 20% vasopressin solution for hemostasis.  All sponges removed from the abdomen.  The abdomen was then copiously irrigated.  The Marco Antonio retractor was removed from the abdomen.  The  fascia was closed with 0 Vicryl in a running fashion.  2 0 Vicryl was used for subcuticular stitch for reapproximation to the skin was closed with 4-0 Monocryl.  The patient tolerated the procedure well.  Sponge needle instrument counts were correct x2.  The patient was taken to the recovery room in stable condition.  I was scrubbed for the entire duration of this procedure.    Significant Surgical Tasks Conducted by the Assistant(s), if Applicable: Majority of the procedure under my supervision    Estimated Blood Loss (EBL): 200 mL    IVF: 1.2L, 500ml albumin    UOP: 375cc             Specimens:   Specimen (24h ago, onward)             Start     Ordered    Pending  Specimen to Pathology, Surgery Gynecology and Obstetrics  Once        Comments: Pre-op Diagnosis: Submucous uterine fibroid [D25.0]Procedure(s):MYOMECTOMY Number of specimens: Name of specimens:     References:    Click here for ordering Quick Tip   Question Answer Comment   Procedure Type: Gynecology and Obstetrics    Specimen Class: Routine/Screening    Which provider would you like to cc? VIC OCHOA III    Release to patient Immediate        Pending    Pending  Specimen to Pathology, Surgery Gynecology and Obstetrics  Once        Comments: Pre-op Diagnosis: Submucous uterine fibroid [D25.0]Procedure(s):MYOMECTOMY Number of specimens: 1Name of specimens:   1. Uterine fibroids     References:    Click here for ordering Quick Tip   Question Answer Comment   Procedure Type: Gynecology and Obstetrics    Specimen Class: Routine/Screening    Which provider would you like to cc? VIC OCHOA III.    Release to patient Immediate        Pending                        Condition: Good    Disposition: PACU - hemodynamically stable.    Attestation: I was present and scrubbed for the entire procedure.

## 2022-08-05 NOTE — INTERVAL H&P NOTE
The patient has been examined and the H&P has been reviewed:    I concur with the findings and no changes have occurred since H&P was written.    Surgery risks, benefits and alternative options discussed and understood by patient/family with Dr Montoya in clinic.     To OR for abdominal myomectomy    Shoshana Watson MD PGY-3  Obstetrics and Gynecology        There are no hospital problems to display for this patient.

## 2022-08-05 NOTE — PROGRESS NOTES
Report called to PHILIP Barcenas.  Pt report pain is at a 4 and is tolerable.  VSS on 2L/m via NC.  Pt resting quietly, sleeping/snoring in between care, arouses to voice.  Dressing CDI.

## 2022-08-05 NOTE — ANESTHESIA PROCEDURE NOTES
Intubation    Date/Time: 8/5/2022 12:38 PM  Performed by: Ira Bonner CRNA  Authorized by: Donna Dumont MD     Intubation:     Induction:  Intravenous    Intubated:  Postinduction    Mask Ventilation:  Easy mask    Attempts:  1    Attempted By:  CRNA    Method of Intubation:  Video laryngoscopy    Blade:  Ellis 3    Laryngeal View Grade: Grade I - full view of cords      Difficult Airway Encountered?: No      Complications:  None    Airway Device:  Oral endotracheal tube    Airway Device Size:  7.5    Inflation Amount (mL):  6    Tube secured:  22    Secured at:  The lips    Placement Verified By:  Capnometry    Complicating Factors:  Obesity    Findings Post-Intubation:  BS equal bilateral and atraumatic/condition of teeth unchanged

## 2022-08-06 LAB
BASOPHILS # BLD AUTO: 0.04 K/UL (ref 0–0.2)
BASOPHILS NFR BLD: 0.3 % (ref 0–1.9)
DIFFERENTIAL METHOD: ABNORMAL
EOSINOPHIL # BLD AUTO: 0 K/UL (ref 0–0.5)
EOSINOPHIL NFR BLD: 0 % (ref 0–8)
ERYTHROCYTE [DISTWIDTH] IN BLOOD BY AUTOMATED COUNT: 13.6 % (ref 11.5–14.5)
HCT VFR BLD AUTO: 28.2 % (ref 37–48.5)
HGB BLD-MCNC: 8.6 G/DL (ref 12–16)
IMM GRANULOCYTES # BLD AUTO: 0.06 K/UL (ref 0–0.04)
IMM GRANULOCYTES NFR BLD AUTO: 0.4 % (ref 0–0.5)
LYMPHOCYTES # BLD AUTO: 1.7 K/UL (ref 1–4.8)
LYMPHOCYTES NFR BLD: 11.1 % (ref 18–48)
MCH RBC QN AUTO: 23.8 PG (ref 27–31)
MCHC RBC AUTO-ENTMCNC: 30.5 G/DL (ref 32–36)
MCV RBC AUTO: 78 FL (ref 82–98)
MONOCYTES # BLD AUTO: 1.1 K/UL (ref 0.3–1)
MONOCYTES NFR BLD: 7.3 % (ref 4–15)
NEUTROPHILS # BLD AUTO: 12.1 K/UL (ref 1.8–7.7)
NEUTROPHILS NFR BLD: 80.9 % (ref 38–73)
NRBC BLD-RTO: 0 /100 WBC
PLATELET # BLD AUTO: 431 K/UL (ref 150–450)
PMV BLD AUTO: 10.7 FL (ref 9.2–12.9)
RBC # BLD AUTO: 3.62 M/UL (ref 4–5.4)
WBC # BLD AUTO: 14.88 K/UL (ref 3.9–12.7)

## 2022-08-06 PROCEDURE — 63600175 PHARM REV CODE 636 W HCPCS: Performed by: STUDENT IN AN ORGANIZED HEALTH CARE EDUCATION/TRAINING PROGRAM

## 2022-08-06 PROCEDURE — 85025 COMPLETE CBC W/AUTO DIFF WBC: CPT | Performed by: STUDENT IN AN ORGANIZED HEALTH CARE EDUCATION/TRAINING PROGRAM

## 2022-08-06 PROCEDURE — 36415 COLL VENOUS BLD VENIPUNCTURE: CPT | Performed by: STUDENT IN AN ORGANIZED HEALTH CARE EDUCATION/TRAINING PROGRAM

## 2022-08-06 PROCEDURE — 25000003 PHARM REV CODE 250: Performed by: STUDENT IN AN ORGANIZED HEALTH CARE EDUCATION/TRAINING PROGRAM

## 2022-08-06 PROCEDURE — 94761 N-INVAS EAR/PLS OXIMETRY MLT: CPT

## 2022-08-06 PROCEDURE — G0378 HOSPITAL OBSERVATION PER HR: HCPCS

## 2022-08-06 PROCEDURE — 96361 HYDRATE IV INFUSION ADD-ON: CPT

## 2022-08-06 PROCEDURE — 96376 TX/PRO/DX INJ SAME DRUG ADON: CPT

## 2022-08-06 RX ORDER — IBUPROFEN 600 MG/1
600 TABLET ORAL EVERY 6 HOURS
Status: DISCONTINUED | OUTPATIENT
Start: 2022-08-06 | End: 2022-08-08 | Stop reason: HOSPADM

## 2022-08-06 RX ORDER — LANOLIN ALCOHOL/MO/W.PET/CERES
1 CREAM (GRAM) TOPICAL DAILY
Status: DISCONTINUED | OUTPATIENT
Start: 2022-08-06 | End: 2022-08-08 | Stop reason: HOSPADM

## 2022-08-06 RX ADMIN — FERROUS SULFATE TAB 325 MG (65 MG ELEMENTAL FE) 1 EACH: 325 (65 FE) TAB at 08:08

## 2022-08-06 RX ADMIN — ONDANSETRON 8 MG: 8 TABLET, ORALLY DISINTEGRATING ORAL at 06:08

## 2022-08-06 RX ADMIN — OXYCODONE AND ACETAMINOPHEN 1 TABLET: 10; 325 TABLET ORAL at 01:08

## 2022-08-06 RX ADMIN — IBUPROFEN 600 MG: 600 TABLET ORAL at 05:08

## 2022-08-06 RX ADMIN — IBUPROFEN 400 MG: 400 TABLET, FILM COATED ORAL at 05:08

## 2022-08-06 RX ADMIN — HYDROMORPHONE HYDROCHLORIDE 1 MG: 2 INJECTION INTRAMUSCULAR; INTRAVENOUS; SUBCUTANEOUS at 06:08

## 2022-08-06 RX ADMIN — OXYCODONE AND ACETAMINOPHEN 1 TABLET: 10; 325 TABLET ORAL at 08:08

## 2022-08-06 RX ADMIN — IBUPROFEN 400 MG: 400 TABLET, FILM COATED ORAL at 12:08

## 2022-08-06 RX ADMIN — HYDROMORPHONE HYDROCHLORIDE 1 MG: 2 INJECTION INTRAMUSCULAR; INTRAVENOUS; SUBCUTANEOUS at 12:08

## 2022-08-06 RX ADMIN — MUPIROCIN 1 G: 20 OINTMENT TOPICAL at 09:08

## 2022-08-06 RX ADMIN — MUPIROCIN 1 G: 20 OINTMENT TOPICAL at 08:08

## 2022-08-06 RX ADMIN — OXYCODONE AND ACETAMINOPHEN 1 TABLET: 10; 325 TABLET ORAL at 05:08

## 2022-08-06 RX ADMIN — IBUPROFEN 600 MG: 600 TABLET ORAL at 01:08

## 2022-08-06 RX ADMIN — SODIUM CHLORIDE, SODIUM LACTATE, POTASSIUM CHLORIDE, AND CALCIUM CHLORIDE 1000 ML: .6; .31; .03; .02 INJECTION, SOLUTION INTRAVENOUS at 08:08

## 2022-08-06 NOTE — PLAN OF CARE
Pt AAO x 4. Dsg to abdomen CDI. Pt voiding straw colored urine. Pain controlled with pain medications as ordered. Tolerating diet well. Pt ambulated in the arango today. Tolerated well.   Problem: Adult Inpatient Plan of Care  Goal: Plan of Care Review  Outcome: Ongoing, Progressing  Goal: Patient-Specific Goal (Individualized)  Outcome: Ongoing, Progressing  Goal: Absence of Hospital-Acquired Illness or Injury  Outcome: Ongoing, Progressing  Goal: Optimal Comfort and Wellbeing  Outcome: Ongoing, Progressing  Goal: Readiness for Transition of Care  Outcome: Ongoing, Progressing     Problem: Infection  Goal: Absence of Infection Signs and Symptoms  Outcome: Ongoing, Progressing     Problem: Fall Injury Risk  Goal: Absence of Fall and Fall-Related Injury  Outcome: Ongoing, Progressing   Pt free from falls, trauma or injury. Safety maintained. Call light in reach. Mother @ bedside. VSS.

## 2022-08-06 NOTE — PROGRESS NOTES
[facility]  Gynecology   Progress Note    Patient Name: Alisha Cardenas  MRN: [id number]  Admission Date: 8/5/2022  Primary Care Provider: Primary Doctor No  Principal Problem: S/P myomectomy    Subjective:     Interval History: POD#1 s/p Mini-laparotomy/abdominal myomectomy.   Patient is doing well this morning. She reports mild abdominal pain that is moderately relived by scheduled and PRN pain medications. She reports light to moderate vaginal bleeding (currently on menstrual cycle). She has not ambulated yet due to concern for pain. She is voiding via sosa catheter. She has not passed flatus. She is tolerating a regular diet without nausea or vomiting, ate small bites of potatoes last night without difficulty.    Scheduled Meds:   acetaminophen  1,000 mg Oral Q8H    ibuprofen  400 mg Oral Q6H    mupirocin  1 g Nasal BID     Continuous Infusions:   sodium chloride 0.9%      lactated ringers       PRN Meds:sodium chloride, albuterol, diphenhydrAMINE, HYDROcodone-acetaminophen, HYDROmorphone, mupirocin, ondansetron, oxyCODONE-acetaminophen, prochlorperazine, simethicone, sodium chloride 0.9%    Review of patient's allergies indicates:   Allergen Reactions    Shellfish containing products Anaphylaxis       Objective:     Vital Signs (Most Recent):  Temp: 98.8 °F (37.1 °C) (08/05/22 2308)  Pulse: 93 (08/05/22 2308)  Resp: 18 (08/06/22 0049)  BP: 118/73 (08/05/22 2308)  SpO2: 100 % (08/05/22 2308) Vital Signs (24h Range):  Temp:  [97.6 °F (36.4 °C)-98.8 °F (37.1 °C)] 98.8 °F (37.1 °C)  Pulse:  [] 93  Resp:  [16-18] 18  SpO2:  [96 %-100 %] 100 %  BP: (112-141)/(64-90) 118/73     Weight: 74.4 kg (164 lb)  Body mass index is 30.99 kg/m².  Patient's last menstrual period was 08/01/2022 (exact date).    I&O (Last 24H):    Intake/Output Summary (Last 24 hours) at 8/6/2022 0148  Last data filed at 8/5/2022 1713  Gross per 24 hour   Intake 2150 ml   Output 1075 ml   Net 1075 ml        Physical Exam:   Constitutional: She is oriented to person, place, and time. She appears well-developed.    Cardiovascular: Normal rate, regular rhythm, normal heart sounds and intact distal pulses.   Pulmonary/Chest: Effort normal and breath sounds normal. No respiratory distress. She has no wheezes.      Abdominal: Incision clean, dry, and intact, with pressure dressing I in place. Soft. Non-distended. There is mild to moderate tenderness diffusely, slightly more in RLQ.   Genitourinary: Choi in place draining clear yellow urine  Neurological: She is alert and oriented to person, place, and time.    Skin: Nails show no clubbing.    Psychiatric: She has a normal mood and affect.     Laboratory:  CBC:   Recent Labs   Lab 08/06/22  0334   WBC 14.88*   RBC 3.62*   HGB 8.6*   HCT 28.2*      MCV 78*   MCH 23.8*   MCHC 30.5*       Assessment/Plan:     Active Diagnoses:    Diagnosis Date Noted POA    PRINCIPAL PROBLEM:  S/P abdominal myomectomy [Z98.890] 08/05/2022 Not Applicable      Problems Resolved During this Admission:       Routine Post-Op Care:  POD#1, doing well, no acute events overnight; continue routine advances  - Regular diet, d/c IVF today  - Remove Choi catheter this morning, active void trial   - SCDs for DVT prophylaxis; Encourage ambulation  - Continue IS  - Continue scheduled colace  - Continue Current pain regimen     Anemia   - H/H 11/35 on admit   - AM H/H 8.6/28.2 today   - Consider repeat CBC later this AM to assess H/H trend     Anticipate discharge POD#1-2, when meeting all postop milestones .    Beverly Hamilton MD  Gynecology   [facility])    Late entry from 8/6/2022  Agree with above. Appreciate input and care  Pt examined by me at the bedside  Will continue to work towards postop milestones.   Will remain inpatient tonight for pain control and monitring of vitals    NESSA Montoya MD PhD  [facility]

## 2022-08-06 NOTE — NURSING
On call obgyn resident stated to wait until 11 am and reinsert the sosa catheter if pt has not yet voided.  Will pass to day shift nursing.  Call light in reach.   Will cont to monitor.

## 2022-08-07 LAB
ABO + RH BLD: NORMAL
BASOPHILS # BLD AUTO: 0.05 K/UL (ref 0–0.2)
BASOPHILS NFR BLD: 0.5 % (ref 0–1.9)
BLD GP AB SCN CELLS X3 SERPL QL: NORMAL
BLD PROD TYP BPU: NORMAL
BLOOD UNIT EXPIRATION DATE: NORMAL
BLOOD UNIT TYPE CODE: 7300
BLOOD UNIT TYPE: NORMAL
CODING SYSTEM: NORMAL
DIFFERENTIAL METHOD: ABNORMAL
DISPENSE STATUS: NORMAL
EOSINOPHIL # BLD AUTO: 0.3 K/UL (ref 0–0.5)
EOSINOPHIL NFR BLD: 2.7 % (ref 0–8)
ERYTHROCYTE [DISTWIDTH] IN BLOOD BY AUTOMATED COUNT: 13.6 % (ref 11.5–14.5)
HCT VFR BLD AUTO: 23.1 % (ref 37–48.5)
HGB BLD-MCNC: 6.9 G/DL (ref 12–16)
IMM GRANULOCYTES # BLD AUTO: 0.02 K/UL (ref 0–0.04)
IMM GRANULOCYTES NFR BLD AUTO: 0.2 % (ref 0–0.5)
LYMPHOCYTES # BLD AUTO: 2.4 K/UL (ref 1–4.8)
LYMPHOCYTES NFR BLD: 24.8 % (ref 18–48)
MCH RBC QN AUTO: 23.6 PG (ref 27–31)
MCHC RBC AUTO-ENTMCNC: 29.9 G/DL (ref 32–36)
MCV RBC AUTO: 79 FL (ref 82–98)
MONOCYTES # BLD AUTO: 0.8 K/UL (ref 0.3–1)
MONOCYTES NFR BLD: 8.5 % (ref 4–15)
NEUTROPHILS # BLD AUTO: 6.1 K/UL (ref 1.8–7.7)
NEUTROPHILS NFR BLD: 63.3 % (ref 38–73)
NRBC BLD-RTO: 0 /100 WBC
PLATELET # BLD AUTO: 321 K/UL (ref 150–450)
PMV BLD AUTO: 10.4 FL (ref 9.2–12.9)
RBC # BLD AUTO: 2.92 M/UL (ref 4–5.4)
TRANS ERYTHROCYTES VOL PATIENT: NORMAL ML
WBC # BLD AUTO: 9.58 K/UL (ref 3.9–12.7)

## 2022-08-07 PROCEDURE — 94761 N-INVAS EAR/PLS OXIMETRY MLT: CPT

## 2022-08-07 PROCEDURE — 25000003 PHARM REV CODE 250: Performed by: OBSTETRICS & GYNECOLOGY

## 2022-08-07 PROCEDURE — 85025 COMPLETE CBC W/AUTO DIFF WBC: CPT | Performed by: STUDENT IN AN ORGANIZED HEALTH CARE EDUCATION/TRAINING PROGRAM

## 2022-08-07 PROCEDURE — 86901 BLOOD TYPING SEROLOGIC RH(D): CPT | Performed by: STUDENT IN AN ORGANIZED HEALTH CARE EDUCATION/TRAINING PROGRAM

## 2022-08-07 PROCEDURE — 36415 COLL VENOUS BLD VENIPUNCTURE: CPT | Performed by: STUDENT IN AN ORGANIZED HEALTH CARE EDUCATION/TRAINING PROGRAM

## 2022-08-07 PROCEDURE — 36591 DRAW BLOOD OFF VENOUS DEVICE: CPT

## 2022-08-07 PROCEDURE — G0378 HOSPITAL OBSERVATION PER HR: HCPCS

## 2022-08-07 PROCEDURE — 86920 COMPATIBILITY TEST SPIN: CPT | Performed by: OBSTETRICS & GYNECOLOGY

## 2022-08-07 PROCEDURE — 25000003 PHARM REV CODE 250: Performed by: STUDENT IN AN ORGANIZED HEALTH CARE EDUCATION/TRAINING PROGRAM

## 2022-08-07 PROCEDURE — P9021 RED BLOOD CELLS UNIT: HCPCS | Performed by: OBSTETRICS & GYNECOLOGY

## 2022-08-07 PROCEDURE — 36430 TRANSFUSION BLD/BLD COMPNT: CPT

## 2022-08-07 RX ORDER — HYDROCODONE BITARTRATE AND ACETAMINOPHEN 500; 5 MG/1; MG/1
TABLET ORAL
Status: DISCONTINUED | OUTPATIENT
Start: 2022-08-07 | End: 2022-08-07

## 2022-08-07 RX ORDER — HYDROCODONE BITARTRATE AND ACETAMINOPHEN 500; 5 MG/1; MG/1
TABLET ORAL
Status: DISCONTINUED | OUTPATIENT
Start: 2022-08-07 | End: 2022-08-08 | Stop reason: HOSPADM

## 2022-08-07 RX ADMIN — MUPIROCIN 1 G: 20 OINTMENT TOPICAL at 11:08

## 2022-08-07 RX ADMIN — IBUPROFEN 600 MG: 600 TABLET ORAL at 11:08

## 2022-08-07 RX ADMIN — IBUPROFEN 600 MG: 600 TABLET ORAL at 05:08

## 2022-08-07 RX ADMIN — ACETAMINOPHEN 1000 MG: 500 TABLET, FILM COATED ORAL at 11:08

## 2022-08-07 RX ADMIN — HYDROCODONE BITARTRATE AND ACETAMINOPHEN 1 TABLET: 5; 325 TABLET ORAL at 06:08

## 2022-08-07 RX ADMIN — FERROUS SULFATE TAB 325 MG (65 MG ELEMENTAL FE) 1 EACH: 325 (65 FE) TAB at 09:08

## 2022-08-07 RX ADMIN — ACETAMINOPHEN 1000 MG: 500 TABLET, FILM COATED ORAL at 12:08

## 2022-08-07 RX ADMIN — OXYCODONE AND ACETAMINOPHEN 1 TABLET: 10; 325 TABLET ORAL at 05:08

## 2022-08-07 RX ADMIN — IBUPROFEN 600 MG: 600 TABLET ORAL at 06:08

## 2022-08-07 RX ADMIN — IBUPROFEN 600 MG: 600 TABLET ORAL at 12:08

## 2022-08-07 RX ADMIN — HYDROCODONE BITARTRATE AND ACETAMINOPHEN 1 TABLET: 5; 325 TABLET ORAL at 01:08

## 2022-08-07 RX ADMIN — SODIUM CHLORIDE: 0.9 INJECTION, SOLUTION INTRAVENOUS at 10:08

## 2022-08-07 NOTE — PLAN OF CARE
VSS and Oriented x4. Abdominal incision CDI. Patient tolerating diet. No complaints of nausea. Pain controlled with PRN medications. Patient ambulating independently. Ambulating 2X in hallway. Plan of care reviewed with patient. Purposeful rounding done, call light at bed side, bed at lowest position, brakes on, non-skid socks on, will continue to monitor.      0625- Type and Screen posted in results, nurse called blood bank to check availability of 1 Unit RBC. Blood bank states it will be read shortly.     Problem: Adult Inpatient Plan of Care  Goal: Optimal Comfort and Wellbeing  8/7/2022 0303 by Shayne Mathis RN  Outcome: Ongoing, Progressing  8/7/2022 0303 by Shayne Mathis RN  Outcome: Ongoing, Progressing     Problem: Fall Injury Risk  Goal: Absence of Fall and Fall-Related Injury  Outcome: Ongoing, Progressing     Problem: Pain Acute  Goal: Acceptable Pain Control and Functional Ability  Outcome: Ongoing, Progressing

## 2022-08-07 NOTE — PROGRESS NOTES
[facility] S[facility]  Gynecology   Progress Note    Patient Name: Alisha Cardenas  MRN: [id number]  Admission Date: 8/5/2022  Primary Care Provider: Primary Doctor No  Principal Problem: S/P myomectomy    Subjective:     Interval History: POD#2 s/p Mini-laparotomy/abdominal myomectomy.   Patient is doing well this morning. She reports abdominal pain that is moderately relived by scheduled and PRN pain medications. She reports light to moderate vaginal bleeding (currently on menstrual cycle). She is ambulating without difficulty and voiding spontanoeously. She is passing flatus. She is tolerating a regular diet without nausea/vomiting. She desires discharge home today.     Scheduled Meds:   acetaminophen  1,000 mg Oral Q8H    ferrous sulfate  1 tablet Oral Daily    ibuprofen  600 mg Oral Q6H    mupirocin  1 g Nasal BID     Continuous Infusions:   lactated ringers       PRN Meds:sodium chloride, albuterol, diphenhydrAMINE, HYDROcodone-acetaminophen, HYDROmorphone, ondansetron, oxyCODONE-acetaminophen, prochlorperazine, simethicone, sodium chloride 0.9%    Review of patient's allergies indicates:   Allergen Reactions    Shellfish containing products Anaphylaxis       Objective:     Vital Signs (Most Recent):  Temp: 98 °F (36.7 °C) (08/07/22 0356)  Pulse: 86 (08/07/22 0356)  Resp: 18 (08/07/22 0356)  BP: 110/69 (08/07/22 0356)  SpO2: 100 % (08/07/22 0356) Vital Signs (24h Range):  Temp:  [96.6 °F (35.9 °C)-99 °F (37.2 °C)] 98 °F (36.7 °C)  Pulse:  [] 86  Resp:  [16-18] 18  SpO2:  [89 %-100 %] 100 %  BP: ()/(48-70) 110/69     Weight: 74.4 kg (164 lb)  Body mass index is 30.99 kg/m².  Patient's last menstrual period was 08/01/2022 (exact date).    I&O (Last 24H):    Intake/Output Summary (Last 24 hours) at 8/7/2022 0505  Last data filed at 8/6/2022 2138  Gross per 24 hour   Intake 720 ml   Output 1000 ml   Net -280 ml       Physical Exam:   Constitutional: She is oriented to person, place,  and time. She appears well-developed.    Cardiovascular: Normal rate  Pulmonary/Chest: Effort normal, No respiratory distress.     Abdominal: Incision clean, dry, and intact, pressure dressing removed. Soft. Mild distension. There is mild to moderate tenderness diffusely. No rebound tenderness or guarding.   Genitourinary: Deferred  Neurological: She is alert and oriented to person, place, and time.    Psychiatric: She has a normal mood and affect.     Laboratory:  CBC:   Recent Labs   Lab 08/07/22  0308   WBC 9.58   RBC 2.92*   HGB 6.9*   HCT 23.1*      MCV 79*   MCH 23.6*   MCHC 29.9*       Assessment/Plan:     Active Diagnoses:    Diagnosis Date Noted POA    PRINCIPAL PROBLEM:  S/P abdominal myomectomy [Z98.890] 08/05/2022 Not Applicable      Problems Resolved During this Admission:       Routine Post-Op Care:  POD#2, doing well, no acute events overnight; continue routine advances  - Regular diet,   - Voiding spontaneously, urine output not charged. 2 unmeasured voids.  - SCDs for DVT prophylaxis; Encourage ambulation  - Continue IS  - Continue scheduled colace  - Continue scheduled ibuprofen/tylenol with oxycodone and dilaudid PRN    Anemia   - H/H 11/35 on admit   - AM H/H 8.6/28.2 on post op day 1. H/h 6.9/23.1   - Will transfuse 1u pRBC for Hb<7  - No symptoms of anemia  - Continue oral iron    Anticipate discharge POD#2, when meeting all postop milestones .    Shoshana Watson MD  Gynecology   [facility] Surg [facility])

## 2022-08-08 VITALS
HEART RATE: 93 BPM | DIASTOLIC BLOOD PRESSURE: 67 MMHG | BODY MASS INDEX: 30.96 KG/M2 | HEIGHT: 61 IN | WEIGHT: 164 LBS | SYSTOLIC BLOOD PRESSURE: 109 MMHG | OXYGEN SATURATION: 94 % | RESPIRATION RATE: 18 BRPM | TEMPERATURE: 99 F

## 2022-08-08 LAB
BASOPHILS # BLD AUTO: 0.08 K/UL (ref 0–0.2)
BASOPHILS NFR BLD: 0.9 % (ref 0–1.9)
DIFFERENTIAL METHOD: ABNORMAL
EOSINOPHIL # BLD AUTO: 0.5 K/UL (ref 0–0.5)
EOSINOPHIL NFR BLD: 5.5 % (ref 0–8)
ERYTHROCYTE [DISTWIDTH] IN BLOOD BY AUTOMATED COUNT: 14.8 % (ref 11.5–14.5)
HCT VFR BLD AUTO: 27.5 % (ref 37–48.5)
HGB BLD-MCNC: 8.5 G/DL (ref 12–16)
IMM GRANULOCYTES # BLD AUTO: 0.04 K/UL (ref 0–0.04)
IMM GRANULOCYTES NFR BLD AUTO: 0.5 % (ref 0–0.5)
LYMPHOCYTES # BLD AUTO: 2.1 K/UL (ref 1–4.8)
LYMPHOCYTES NFR BLD: 23.9 % (ref 18–48)
MCH RBC QN AUTO: 24.9 PG (ref 27–31)
MCHC RBC AUTO-ENTMCNC: 30.9 G/DL (ref 32–36)
MCV RBC AUTO: 81 FL (ref 82–98)
MONOCYTES # BLD AUTO: 0.7 K/UL (ref 0.3–1)
MONOCYTES NFR BLD: 7.7 % (ref 4–15)
NEUTROPHILS # BLD AUTO: 5.4 K/UL (ref 1.8–7.7)
NEUTROPHILS NFR BLD: 61.5 % (ref 38–73)
NRBC BLD-RTO: 0 /100 WBC
PLATELET # BLD AUTO: 323 K/UL (ref 150–450)
PMV BLD AUTO: 10.1 FL (ref 9.2–12.9)
RBC # BLD AUTO: 3.41 M/UL (ref 4–5.4)
WBC # BLD AUTO: 8.71 K/UL (ref 3.9–12.7)

## 2022-08-08 PROCEDURE — 85025 COMPLETE CBC W/AUTO DIFF WBC: CPT | Performed by: STUDENT IN AN ORGANIZED HEALTH CARE EDUCATION/TRAINING PROGRAM

## 2022-08-08 PROCEDURE — G0378 HOSPITAL OBSERVATION PER HR: HCPCS

## 2022-08-08 PROCEDURE — 36415 COLL VENOUS BLD VENIPUNCTURE: CPT | Performed by: STUDENT IN AN ORGANIZED HEALTH CARE EDUCATION/TRAINING PROGRAM

## 2022-08-08 PROCEDURE — 25000003 PHARM REV CODE 250: Performed by: STUDENT IN AN ORGANIZED HEALTH CARE EDUCATION/TRAINING PROGRAM

## 2022-08-08 RX ORDER — IRON POLYSACCHARIDE COMPLEX 150 MG
150 CAPSULE ORAL DAILY
Qty: 30 CAPSULE | Refills: 2 | Status: SHIPPED | OUTPATIENT
Start: 2022-08-08 | End: 2023-04-12

## 2022-08-08 RX ORDER — IBUPROFEN 600 MG/1
600 TABLET ORAL EVERY 6 HOURS
Qty: 90 TABLET | Refills: 0 | Status: SHIPPED | OUTPATIENT
Start: 2022-08-08 | End: 2023-04-12

## 2022-08-08 RX ORDER — HYDROCODONE BITARTRATE AND ACETAMINOPHEN 5; 325 MG/1; MG/1
1 TABLET ORAL EVERY 4 HOURS PRN
Qty: 30 TABLET | Refills: 0 | Status: SHIPPED | OUTPATIENT
Start: 2022-08-08 | End: 2023-04-12

## 2022-08-08 RX ORDER — DOCUSATE SODIUM 100 MG/1
100 CAPSULE, LIQUID FILLED ORAL 2 TIMES DAILY
Qty: 60 CAPSULE | Refills: 0 | Status: SHIPPED | OUTPATIENT
Start: 2022-08-08 | End: 2023-04-12

## 2022-08-08 RX ADMIN — HYDROCODONE BITARTRATE AND ACETAMINOPHEN 1 TABLET: 5; 325 TABLET ORAL at 06:08

## 2022-08-08 RX ADMIN — ACETAMINOPHEN 1000 MG: 500 TABLET, FILM COATED ORAL at 06:08

## 2022-08-08 RX ADMIN — MUPIROCIN 1 G: 20 OINTMENT TOPICAL at 09:08

## 2022-08-08 RX ADMIN — IBUPROFEN 600 MG: 600 TABLET ORAL at 06:08

## 2022-08-08 RX ADMIN — FERROUS SULFATE TAB 325 MG (65 MG ELEMENTAL FE) 1 EACH: 325 (65 FE) TAB at 08:08

## 2022-08-08 RX ADMIN — HYDROCODONE BITARTRATE AND ACETAMINOPHEN 1 TABLET: 5; 325 TABLET ORAL at 10:08

## 2022-08-08 NOTE — PLAN OF CARE
VSS on RA. Abdominal incision CDI. No complaints of nausea. Pain 1-4/10 during shift, no request for PRN pain medications. Patient ambulating/repositioning independently. Voiding without difficulty. Mother at bedside, supportive/attentive to patient. Plan of care reviewed with patient. Purposeful rounding done, call light at bed side, bed at lowest position, brakes on, non-skid socks on, will continue to monitor.      Problem: Adult Inpatient Plan of Care  Goal: Optimal Comfort and Wellbeing  Outcome: Ongoing, Progressing     Problem: Fall Injury Risk  Goal: Absence of Fall and Fall-Related Injury  Outcome: Ongoing, Progressing     Problem: Pain Acute  Goal: Acceptable Pain Control and Functional Ability  Outcome: Ongoing, Progressing

## 2022-08-08 NOTE — DISCHARGE SUMMARY
[facility]  Obstetrics & Gynecology  Discharge Summary    Patient Name: Alisha Cardenas  MRN: [id number]  Admission Date: 8/5/2022  Hospital Length of Stay: 1 days  Discharge Date and Time:  08/08/2022 10:08 AM  Attending Physician: Renzo Montoya III, MD   Discharging Provider: Beverly Hamilton MD  Primary Care Provider: Primary Doctor No    HPI:   Ms. Cardenas presented for abdominal myomectomy due to innumerable, symptomatic fibroids in setting of recurrent pregnancy loss.     Hospital Course:   8/5/22: POD 0 s/p abdominal myomectomy. 12 fibroids removed, please see op note for full details. Routine post op advances  8/6/22: POD 1. Will remain inpatient for better pain control with scheduled ibuprofen/tylenol with oxy IR and dilaudid for BTP. 1L bolus given for symptomatic hypotension with improvement in BP.  H/h 8/28 8/7/22: POD 2. Received 1u pRBC for Hb <7. Following blood transfusion she was meeting all post op milestones: pain controlled, tolerating PO, ambulating, voiding spontaneously, and passing flatus.  08/08/2022: POD#3. Patient now s/p transfusion 1u pRBC on POD#2 with appropriate rise in H/H to 8.5/27.5. She was meeting all postop milestones and was deemed stable for discharge.     Procedure(s) (LRB):  MYOMECTOMY (N/A)     Consults:     Significant Diagnostic Studies: Labs:   CBC   Recent Labs   Lab 08/07/22  0308 08/08/22  0557   WBC 9.58 8.71   HGB 6.9* 8.5*   HCT 23.1* 27.5*    323       Pending Diagnostic Studies:     Procedure Component Value Units Date/Time    Specimen to Pathology, Surgery Gynecology and Obstetrics [[id number]] Collected: 08/05/22 1514    Order Status: Sent Lab Status: In process Updated: 08/05/22 1643    Specimen: Tissue         Final Active Diagnoses:    Diagnosis Date Noted POA    PRINCIPAL PROBLEM:  S/P abdominal myomectomy [Z98.890] 08/05/2022 Not Applicable      Problems Resolved During this Admission:        Discharged Condition:  good    Disposition:     Follow Up:   Follow-up Information     Renzo Montoya III, MD. Go on 8/23/2022.    Specialties: Obstetrics and Gynecology, Reproductive Endocrinology and Infertility  Why: 1:00pm  Contact information:  [address]  [address]  [place] [address]  [phone]                       Patient Instructions:      Diet Adult Regular     Lifting restrictions   Order Comments: No lifting > 10 lbs until postoperative appointment     No driving until:   Order Comments: No longer taking narcotics. Able to safely hit brakes without pain.     Pelvic Rest   Order Comments: Until cleared by primary OBGYN at follow up visit     No dressing needed     Notify your health care provider if you experience any of the following:  temperature >100.4     Notify your health care provider if you experience any of the following:  persistent nausea and vomiting or diarrhea     Notify your health care provider if you experience any of the following:  severe uncontrolled pain     Notify your health care provider if you experience any of the following:  redness, tenderness, or signs of infection (pain, swelling, redness, odor or green/yellow discharge around incision site)     Notify your health care provider if you experience any of the following:  difficulty breathing or increased cough     Notify your health care provider if you experience any of the following:  severe persistent headache     Notify your health care provider if you experience any of the following:  worsening rash     Notify your health care provider if you experience any of the following:  persistent dizziness, light-headedness, or visual disturbances     Notify your health care provider if you experience any of the following:  increased confusion or weakness     Notify your health care provider if you experience any of the following:   Order Comments: Vaginal bleeding saturating more than one pad per hour for >2 hours     Activity as tolerated      Medications:  Reconciled Home Medications:      Medication List      START taking these medications    docusate sodium 100 MG capsule  Commonly known as: COLACE  Take 1 capsule (100 mg total) by mouth 2 (two) times daily.     FERREX 150 150 mg iron Cap  Generic drug: iron polysaccharides  Take 1 capsule (150 mg total) by mouth once daily.     HYDROcodone-acetaminophen 5-325 mg per tablet  Commonly known as: NORCO  Take 1 tablet by mouth every 4 (four) hours as needed for Pain.     ibuprofen 600 MG tablet  Commonly known as: ADVIL,MOTRIN  Take 1 tablet (600 mg total) by mouth every 6 (six) hours.        CHANGE how you take these medications    ferrous sulfate 325 mg (65 mg iron) Tab tablet  Commonly known as: FEOSOL  Take 1 tablet (325 mg total) by mouth 2 (two) times daily.  What changed: when to take this        CONTINUE taking these medications    albuterol 90 mcg/actuation inhaler  Commonly known as: PROVENTIL/VENTOLIN HFA  Inhale 1-2 puffs into the lungs every 6 (six) hours as needed for Wheezing. Rescue     fluticasone propionate 50 mcg/actuation nasal spray  Commonly known as: FLONASE  2 sprays (100 mcg total) by Each Nostril route once daily.     norgestimate-ethinyl estradioL 0.25-35 mg-mcg per tablet  Commonly known as: ORTHO-CYCLEN  Take 1 tablet by mouth once daily.     WOMEN'S ONE DAILY ORAL  Take by mouth once daily.     ZYRTEC ORAL  Take by mouth once daily at 6am.            Beverly Hamilton MD  Obstetrics & Gynecology  [facility]

## 2022-08-08 NOTE — PROGRESS NOTES
[facility] Surg [facility]  Gynecology   Progress Note    Patient Name: Alisha Cardenas  MRN: [id number]  Admission Date: 8/5/2022  Primary Care Provider: Primary Doctor No  Principal Problem: S/P myomectomy    Subjective:     Interval History: POD#3 s/p Mini-laparotomy/abdominal myomectomy.   Patient is doing well this morning. She reports abdominal pain that is appropriately relived by scheduled and PRN pain medications. She reports light to moderate vaginal bleeding. She is ambulating without difficulty and voiding spontanoeously. She is passing flatus. She is tolerating a regular diet without nausea/vomiting. She desires discharge home today.     Scheduled Meds:   acetaminophen  1,000 mg Oral Q8H    ferrous sulfate  1 tablet Oral Daily    ibuprofen  600 mg Oral Q6H    mupirocin  1 g Nasal BID     Continuous Infusions:   lactated ringers       PRN Meds:sodium chloride, albuterol, diphenhydrAMINE, HYDROcodone-acetaminophen, HYDROmorphone, ondansetron, oxyCODONE-acetaminophen, prochlorperazine, simethicone, sodium chloride 0.9%    Review of patient's allergies indicates:   Allergen Reactions    Shellfish containing products Anaphylaxis       Objective:     Vital Signs (Most Recent):  Temp: 98.4 °F (36.9 °C) (08/08/22 0329)  Pulse: 90 (08/08/22 0329)  Resp: 15 (08/08/22 0642)  BP: 110/64 (08/08/22 0329)  SpO2: (!) 94 % (08/08/22 0329) Vital Signs (24h Range):  Temp:  [97.5 °F (36.4 °C)-99.4 °F (37.4 °C)] 98.4 °F (36.9 °C)  Pulse:  [] 90  Resp:  [15-18] 15  SpO2:  [92 %-99 %] 94 %  BP: ()/(51-67) 110/64     Weight: 74.4 kg (164 lb)  Body mass index is 30.99 kg/m².  Patient's last menstrual period was 08/01/2022 (exact date).    I&O (Last 24H):    Intake/Output Summary (Last 24 hours) at 8/8/2022 0701  Last data filed at 8/7/2022 2344  Gross per 24 hour   Intake 1442.5 ml   Output 1250 ml   Net 192.5 ml       Physical Exam:   Constitutional: She is oriented to person, place, and time. She  appears well-developed.    Cardiovascular: Normal rate  Pulmonary/Chest: Effort normal, No respiratory distress.     Abdominal: Incision clean, dry, and intact. Dressing removed. Soft. Mild distension. There is mild to moderate tenderness diffusely. No rebound tenderness or guarding.   Genitourinary: Deferred  Neurological: She is alert and oriented to person, place, and time.    Psychiatric: She has a normal mood and affect.     Laboratory:  CBC:   Recent Labs   Lab 08/08/22  0557   WBC 8.71   RBC 3.41*   HGB 8.5*   HCT 27.5*      MCV 81*   MCH 24.9*   MCHC 30.9*       Assessment/Plan:     Active Diagnoses:    Diagnosis Date Noted POA    PRINCIPAL PROBLEM:  S/P abdominal myomectomy [Z98.890] 08/05/2022 Not Applicable      Problems Resolved During this Admission:       Routine Post-Op Care:  POD#3, doing well, no acute events overnight; continue routine advances  - Regular diet,   - Voiding spontaneously, urine output adequate at 0.5cc/kg/hr  - SCDs for DVT prophylaxis; Encourage ambulation  - Continue IS  - Continue scheduled colace  - Continue scheduled ibuprofen/tylenol with oxycodone and dilaudid PRN    Anemia   - H/H 11/35 on admit   - AM H/H 8.6/28.2>6.9/23.1>8.5/27.5  - No symptoms of anemia  - Continue oral iron    Meeting all post op milestones. Will plan for discharge today.    Shoshana Watson MD PGY3  Gynecology   [facility] Surg [facility])

## 2022-08-08 NOTE — PLAN OF CARE
Family at bedside and will provide transportation home. Follow up appointments scheduled and added to AVS. No CM needs identified.   08/08/22 1018   Final Note   Assessment Type Final Discharge Note   Anticipated Discharge Disposition Home   Hospital Resources/Appts/Education Provided Provided patient/caregiver with written discharge plan information;Appointments scheduled and added to AVS;Appointments scheduled by Navigator/Coordinator   Post-Acute Status   Discharge Delays None known at this time   [facility])  Discharge Final Note    Primary Care Provider: Primary Doctor No    Expected Discharge Date: 8/8/2022    Final Discharge Note (most recent)       Final Note - 08/08/22 1018          Final Note    Assessment Type Final Discharge Note (P)      Anticipated Discharge Disposition Home or Self Care (P)      Hospital Resources/Appts/Education Provided Provided patient/caregiver with written discharge plan information;Appointments scheduled and added to AVS;Appointments scheduled by Navigator/Coordinator (P)         Post-Acute Status    Discharge Delays None known at this time (P)                      Important Message from Medicare             Contact Info       Renzo Montoya III, MD   Specialty: Obstetrics and Gynecology, Reproductive Endocrinology and Infertility    [address]  [address]  [place] [address]   Phone: [phone]       Next Steps: Go on 8/23/2022    Instructions: 1:00pm

## 2022-08-08 NOTE — PLAN OF CARE
[id number] able to make needs known .  VSS. No c/o pain or discomfort at this time . On assessment incision/dsg clean dry , intact . Plan of care  and discharge instruction discuss with patient verbalizes understanding .   Iv removed from  left hand no redness or swelling noted .Family (mother) at bedside . Purposeful round completed . Patient currently sitting up in recliner chair . Safety maintain call bell in reach   Problem: Adult Inpatient Plan of Care  Goal: Plan of Care Review  Outcome: Met  Goal: Patient-Specific Goal (Individualized)  Outcome: Met  Goal: Absence of Hospital-Acquired Illness or Injury  Outcome: Met  Goal: Optimal Comfort and Wellbeing  Outcome: Met  Goal: Readiness for Transition of Care  Outcome: Met     Problem: Infection  Goal: Absence of Infection Signs and Symptoms  Outcome: Met     Problem: Fall Injury Risk  Goal: Absence of Fall and Fall-Related Injury  Outcome: Met     Problem: Pain Acute  Goal: Acceptable Pain Control and Functional Ability  Outcome: Met

## 2022-08-11 LAB
FINAL PATHOLOGIC DIAGNOSIS: NORMAL
GROSS: NORMAL
Lab: NORMAL

## 2022-10-27 ENCOUNTER — PATIENT MESSAGE (OUTPATIENT)
Dept: ADMINISTRATIVE | Facility: OTHER | Age: 32
End: 2022-10-27
Payer: COMMERCIAL

## 2023-04-12 ENCOUNTER — OFFICE VISIT (OUTPATIENT)
Dept: OBSTETRICS AND GYNECOLOGY | Facility: CLINIC | Age: 33
End: 2023-04-12
Attending: OBSTETRICS & GYNECOLOGY
Payer: COMMERCIAL

## 2023-04-12 ENCOUNTER — LAB VISIT (OUTPATIENT)
Dept: LAB | Facility: OTHER | Age: 33
End: 2023-04-12
Attending: OBSTETRICS & GYNECOLOGY
Payer: COMMERCIAL

## 2023-04-12 VITALS
HEIGHT: 61 IN | RESPIRATION RATE: 18 BRPM | DIASTOLIC BLOOD PRESSURE: 80 MMHG | SYSTOLIC BLOOD PRESSURE: 126 MMHG | WEIGHT: 190.94 LBS | BODY MASS INDEX: 36.05 KG/M2

## 2023-04-12 DIAGNOSIS — Z01.419 WELL WOMAN EXAM: ICD-10-CM

## 2023-04-12 DIAGNOSIS — Z20.2 POSSIBLE EXPOSURE TO STD: ICD-10-CM

## 2023-04-12 DIAGNOSIS — Z01.419 WELL WOMAN EXAM: Primary | ICD-10-CM

## 2023-04-12 LAB — RPR SER QL: NORMAL

## 2023-04-12 PROCEDURE — 80074 ACUTE HEPATITIS PANEL: CPT | Performed by: OBSTETRICS & GYNECOLOGY

## 2023-04-12 PROCEDURE — 86695 HERPES SIMPLEX TYPE 1 TEST: CPT | Performed by: OBSTETRICS & GYNECOLOGY

## 2023-04-12 PROCEDURE — 99395 PREV VISIT EST AGE 18-39: CPT | Mod: S$GLB,,, | Performed by: OBSTETRICS & GYNECOLOGY

## 2023-04-12 PROCEDURE — 87389 HIV-1 AG W/HIV-1&-2 AB AG IA: CPT | Performed by: OBSTETRICS & GYNECOLOGY

## 2023-04-12 PROCEDURE — 99999 PR PBB SHADOW E&M-EST. PATIENT-LVL III: CPT | Mod: PBBFAC,,, | Performed by: OBSTETRICS & GYNECOLOGY

## 2023-04-12 PROCEDURE — 99395 PR PREVENTIVE VISIT,EST,18-39: ICD-10-PCS | Mod: S$GLB,,, | Performed by: OBSTETRICS & GYNECOLOGY

## 2023-04-12 PROCEDURE — 86592 SYPHILIS TEST NON-TREP QUAL: CPT | Performed by: OBSTETRICS & GYNECOLOGY

## 2023-04-12 PROCEDURE — 88142 CYTOPATH C/V THIN LAYER: CPT | Performed by: OBSTETRICS & GYNECOLOGY

## 2023-04-12 PROCEDURE — 99999 PR PBB SHADOW E&M-EST. PATIENT-LVL III: ICD-10-PCS | Mod: PBBFAC,,, | Performed by: OBSTETRICS & GYNECOLOGY

## 2023-04-12 PROCEDURE — 36415 COLL VENOUS BLD VENIPUNCTURE: CPT | Performed by: OBSTETRICS & GYNECOLOGY

## 2023-04-12 PROCEDURE — 87624 HPV HI-RISK TYP POOLED RSLT: CPT | Performed by: OBSTETRICS & GYNECOLOGY

## 2023-04-12 PROCEDURE — 87591 N.GONORRHOEAE DNA AMP PROB: CPT | Performed by: OBSTETRICS & GYNECOLOGY

## 2023-04-12 NOTE — PROGRESS NOTES
CC: Well woman exam    Alisha Cardenas is a 32 y.o. female  presents for well woman exam.  LMP: No LMP recorded..  No gyn issues, problems, or complaints.       myomectomy (Dr Montoya)    Currently sexually active, would be ok with pregnancy  Discussed PNV or MVI with folic acid for NTD px    Last pap a few years ago.  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: , habitual w/o pregnancy-No Additional Notes    Missed  2014 9:22:49 AM    [facility]: , Missed, Less than 22 Weeks-No Additional Notes    Prior miscarriage with pregnancy in first trimester, antepartum     Prior miscarriage with pregnancy in first trimester, antepartum     Prior miscarriage with pregnancy in second trimester, antepartum      Past Surgical History:   Procedure Laterality Date    DILATION AND CURETTAGE OF UTERUS      MYOMECTOMY N/A 2022    Procedure: MYOMECTOMY;  Surgeon: Renzo Montoya III, MD;  Location: [facility];  Service: OB/GYN;  Laterality: N/A;    none      TONSILLECTOMY       Social History     Socioeconomic History    Marital status: Single   Tobacco Use    Smoking status: Never    Smokeless tobacco: Never   Substance and Sexual Activity    Alcohol use: Yes     Comment: weekends    Drug use: No    Sexual activity: Yes     Partners: Male     Birth control/protection: OCP     Social Determinants of Health     Financial Resource Strain: Low Risk     Difficulty of Paying Living Expenses: Not hard at all   Food Insecurity: No Food Insecurity    Worried About Running Out of Food in the Last Year: Never  "true    Ran Out of Food in the Last Year: Never true   Transportation Needs: No Transportation Needs    Lack of Transportation (Medical): No    Lack of Transportation (Non-Medical): No   Physical Activity: Insufficiently Active    Days of Exercise per Week: 1 day    Minutes of Exercise per Session: 30 min   Stress: No Stress Concern Present    Feeling of Stress : Not at all   Social Connections: Socially Isolated    Frequency of Communication with Friends and Family: Twice a week    Frequency of Social Gatherings with Friends and Family: Once a week    Attends Baptism Services: Never    Active Member of Clubs or Organizations: No    Attends Club or Organization Meetings: Never    Marital Status: Never    Housing Stability: Unknown    Unable to Pay for Housing in the Last Year: No    Unstable Housing in the Last Year: No     Family History   Problem Relation Age of Onset    No Known Problems Mother     No Known Problems Father     Vaginal cancer Neg Hx     Endometrial cancer Neg Hx     Cervical cancer Neg Hx      OB History          3    Para        Term                AB   3    Living             SAB        IAB        Ectopic        Multiple        Live Births                     /80   Resp 18   Ht 5' 1" (1.549 m)   Wt 86.6 kg (190 lb 14.7 oz)   BMI 36.07 kg/m²       ROS:  GENERAL: Denies weight gain or weight loss. Feeling well overall.   SKIN: Denies rash or lesions.   HEAD: Denies head injury or headache.   NODES: Denies enlarged lymph nodes.   CHEST: Denies chest pain or shortness of breath.   CARDIOVASCULAR: Denies palpitations or left sided chest pain.   ABDOMEN: No abdominal pain, constipation, diarrhea, nausea, vomiting or rectal bleeding.   URINARY: No frequency, dysuria, hematuria, or burning on urination.  REPRODUCTIVE: See HPI.   BREASTS: The patient performs breast self-examination and denies pain, lumps, or nipple discharge.   HEMATOLOGIC: No easy bruisability or " excessive bleeding.   MUSCULOSKELETAL: Denies joint pain or swelling.   NEUROLOGIC: Denies syncope or weakness.   PSYCHIATRIC: Denies depression, anxiety or mood swings.    PHYSICAL EXAM:  APPEARANCE: Well nourished, well developed, in no acute distress.  AFFECT: WNL, alert and oriented x 3  SKIN: No acne or hirsutism  NECK: Neck symmetric without masses or thyromegaly  NODES: No inguinal, cervical, axillary, or femoral lymph node enlargement  CHEST: Good respiratory effect  ABDOMEN: Soft.  No tenderness or masses.  No hepatosplenomegaly.  No hernias.  BREASTS: Symmetrical, no skin changes or visible lesions.  No palpable masses, nipple discharge bilaterally.  PELVIC: Normal external genitalia without lesions.  Normal hair distribution.  Adequate perineal body, normal urethral meatus.  Vagina moist and well rugated without lesions or discharge.  Cervix pink, without lesions, discharge or tenderness.  No significant cystocele or rectocele.  Bimanual exam shows uterus to be normal size, regular, mobile and nontender.  Adnexa without masses or tenderness.    EXTREMITIES: No edema.      ASSESSMENT & PLAN    ICD-10-CM ICD-9-CM    1. Well woman exam  Z01.419 V72.31 C. trachomatis/N. gonorrhoeae by AMP DNA      Hepatitis Panel, Acute      HSV 1 & 2, IgG      RPR      HIV 1/2 Ag/Ab (4th Gen)      Liquid-Based Pap Smear, Screening      HPV High Risk Genotypes, PCR      2. Possible exposure to STD  Z20.2 V01.6 C. trachomatis/N. gonorrhoeae by AMP DNA      Hepatitis Panel, Acute      HSV 1 & 2, IgG      RPR      HIV 1/2 Ag/Ab (4th Gen)             Patient was counseled today on A.C.S. Pap guidelines and recommendations for yearly pelvic exams, mammograms and monthly self breast exams; to see her PCP for other health maintenance.

## 2023-04-13 LAB
HAV IGM SERPL QL IA: NORMAL
HBV CORE IGM SERPL QL IA: NORMAL
HBV SURFACE AG SERPL QL IA: NORMAL
HCV AB SERPL QL IA: NORMAL
HIV 1+2 AB+HIV1 P24 AG SERPL QL IA: NORMAL
HSV1 IGG SERPL QL IA: POSITIVE
HSV2 IGG SERPL QL IA: NEGATIVE

## 2023-04-14 LAB
C TRACH DNA SPEC QL NAA+PROBE: NOT DETECTED
N GONORRHOEA DNA SPEC QL NAA+PROBE: NOT DETECTED

## 2023-04-18 ENCOUNTER — PATIENT MESSAGE (OUTPATIENT)
Dept: OBSTETRICS AND GYNECOLOGY | Facility: CLINIC | Age: 33
End: 2023-04-18
Payer: COMMERCIAL

## 2023-04-18 LAB
HPV HR 12 DNA SPEC QL NAA+PROBE: NEGATIVE
HPV16 AG SPEC QL: NEGATIVE
HPV18 DNA SPEC QL NAA+PROBE: NEGATIVE

## 2023-04-19 ENCOUNTER — PATIENT MESSAGE (OUTPATIENT)
Dept: RESEARCH | Facility: HOSPITAL | Age: 33
End: 2023-04-19
Payer: COMMERCIAL

## 2023-04-20 LAB
FINAL PATHOLOGIC DIAGNOSIS: NORMAL
Lab: NORMAL

## 2023-11-28 NOTE — PROGRESS NOTES
The patient location is: [place]  The chief complaint leading to consultation is: high risk     Visit type: audiovisual      45 minutes of total time spent on the encounter, which includes face to face time and non-face to face time preparing to see the patient (eg, review of tests), Obtaining and/or reviewing separately obtained history, Documenting clinical information in the electronic or other health record, Independently interpreting results (not separately reported) and communicating results to the patient/family/caregiver, or Care coordination (not separately reported).         Each patient to whom he or she provides medical services by telemedicine is:  (1) informed of the relationship between the physician and patient and the respective role of any other health care provider with respect to management of the patient; and (2) notified that he or she may decline to receive medical services by telemedicine and may withdraw from such care at any time.    Notes:     Reason For Consultation:   High-Risk Breast Cancer      Referring Provider:   Self, Aaareferral  No address on file    Records Obtained: Records of the patients history including those obtained from the referring provider were reviewed and summarized in detail.    HPI:   Alisha Cardenas is a 33 y.o. who presents for consultation of increased risk of breast cancer.    She self scheduled as felt lump in left breast last weekend.   Mildly tender. No erythema.    Today, Feels good and no complaints.   No breast concerns.  Reports had MMG 1 year ago at [facility] but I do not see results in chart      High Risk Breast cancer specific history:  - Age: 33 y.o.   - Height:  5'1  - Weight:   Wt Readings from Last 3 Encounters:   04/12/23 0958 86.6 kg (190 lb 14.7 oz)   01/19/23 0756 83.6 kg (184 lb 4.9 oz)   08/06/22 0900 74.4 kg (164 lb)   08/04/22 1300 74.4 kg (164 lb)      - Breast density per BI-RADS:  unknown  - Age at menarche:  10 yo  - Number of  pregnancies: ;   -Uterus and ovaries intact: Yes  - She is premenopausal. Age at menopause, if applicable:  n/a.   - HRT: No  - Genetic testing: No  - Personal history of cancer: No  - Previous chest radiation exposure between ages 10-30 years old: No  - Personal history of breast biopsy: No  - Ashkenazi Hoahaoism Inheritance: No  - Family history of cancer:    Maternal first cousin- breast cancer dx 32yo currently 36 yo. Unknown genetics  Materna, great aunt - breast cancer  No other cancers.     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     SEE CALCULATED RISK BELOW.     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 2014 9:22:57 AM    [facility]: , habitual w/o pregnancy-No Additional Notes    Missed  2014 9:22:49 AM    [facility]: , Missed, Less than 22 Weeks-No Additional Notes    Prior miscarriage with pregnancy in first trimester, antepartum     Prior miscarriage with pregnancy in first trimester, antepartum     Prior miscarriage with pregnancy in second trimester, antepartum      Patient Active Problem List   Diagnosis    Acute anemia    Viral syndrome    S/P abdominal myomectomy     Social History   Social History     Tobacco Use    Smoking status: Never    Smokeless tobacco: Never   Substance Use Topics    Alcohol use: Yes     Comment: weekends    Drug use: No     Family History  Family History   Problem Relation Age of Onset    No Known Problems Mother     No Known Problems Father     Vaginal cancer Neg Hx     Endometrial cancer Neg Hx     Cervical cancer Neg Hx      Medications    Current  Outpatient Medications:     cetirizine HCl (ZYRTEC ORAL), Take by mouth once daily at 6am., Disp: , Rfl:     multivit,calc,mins/iron/folic (WOMEN'S ONE DAILY ORAL), Take by mouth once daily., Disp: , Rfl:   Allergies  Review of patient's allergies indicates:   Allergen Reactions    Iodine Anaphylaxis    Shellfish containing products Anaphylaxis       Review of Systems       See above   All other systems reviewed and are negative.    Objective:      Vitals: There were no vitals filed for this visit.  BMI: There is no height or weight on file to calculate BMI.   There is no height or weight on file to calculate BSA.    Physical Exam  Limited as virtual.   Appears comfortable.       Laboratory Data: reviewed most recent   Imaging: reviewed most recent      General Education discussed:      1. General education: (not patient specific)    Risk factors associated with breast cancer are categorized into 2 groups: Modifiable and Non-modifiable. Modifiable risk factors include use of hormones, alcohol, smoking, diet and exercise. Non-modifiable risk factors include breast density, genetics, chest radiation, previous pregnancies, age of first period, and age of menopause.     Factors associated with greater breast cancer risk: (this list is not patient specific)  -Increasing age The risk of breast cancer increases with older age.  -Female sex  -White race (In the [place], the highest breast cancer risk occurs among White women, although breast cancer remains the most common cancer among women of every major ethnic/racial group )  -Weight and body fat in postmenopausal women -Obesity (defined as body mass index [BMI] ?30 kg/m2) is associated with an overall increase  in morbidity and mortality. However, the risk of breast cancer associated with BMI differs by menopausal status.   ?Postmenopausal women - A higher BMI and/or perimenopausal weight gain have been consistently associated with a higher risk of breast cancer  among postmenopausal women. The association between a higher BMI and postmenopausal breast cancer risk may be mediated by higher estrogen levels resulting from the peripheral conversion of estrogen precursors (from adipose tissue) to estrogen    ?Inverse relationship in premenopausal women - Unlike postmenopausal women, an increased BMI is associated with a lower risk of breast cancer in premenopausal women, particularly in early adulthood.  The explanation of this finding remains unclear.  -Tall stature -women who were >175 cm (69 inches) tall were 20 percent more likely to develop breast cancer than those <160 cm (63 inches) tall.  -Benign breast disease  -Dense breast tissue -- The density of breast tissue reflects the relative amount of glandular and connective tissue (parenchyma) to adipose tissue. Women with mammographically dense breast tissue, generally defined as dense tissue comprising ?75 percent of the breast, have a four to five times higher breast cancer risk compared with women of similar age with less or no dense tissue. Although breast density is a largely inherited trait, other factors can influence density. For example, lower density has been associated with higher levels of physical activity  and with a low-fat, high-carbohydrate diet. In postmenopausal women, estrogen and progesterone increase breast density  while the ER antagonist tamoxifen decreases breast density.  Despite the association of exogenous hormones with breast density, breast density is not strongly correlated with endogenous hormone levels. Breast tend to become more fatty with age.   -Bone mineral density -In multiple studies, women with higher bone density have a higher breast cancer risk  -Hormonal factors: HRT. Of note, IVF does not appear to increase the long-term risk of breast cancer, even in women with BRCA 1 and 2 mutations.     In the Women's Health Initiative (WHI), the risk of invasive breast cancer was significantly  increased with combined hormone therapy (HT) at an average follow-up of 5.6 years.     A 2019 meta-analysis of all available epidemiologic evidence on the association between menopausal hormone therapy (MHT) use and breast cancer risk has been published. The analysis included nearly 145,000 women with breast cancer (51 percent of whom had used MHT) and nearly 425,000 without breast cancer. Their findings included:  ?Similar to the WHI, estrogen-progestin regimens were associated with excess breast cancer risk. An excess risk was also seen with estrogen-only regimens (a reduction in risk was seen in the WHI). There was no excess risk with vaginal estrogens.   ?Breast cancer risk increased with the duration of systemic MHT use. Unlike previous studies, obesity was not associated with excess risk; instead, it attenuated risk.  ?The authors of the study calculated that for women of average weight, five years of MHT use starting at age 50 years would increase their 20-year risk of breast cancer (between the ages of 50 and 69 years) by approximately:  One in every 50 users of estrogen plus daily progestin  One in every 70 users of estrogen plus intermittent progestin   One in every 200 users of estrogen-only regimens   Of note: There were important limitations in this study.   While this meta-analysis has renewed concerns for some about the association between MHT and breast cancer, we continue to suggest an individualized approach when counseling symptomatic postmenopausal women about treatment. This includes putting the potential risk of breast cancer (and cardiovascular disease) in the context of the benefits of MHT (eg, relief of vasomotor symptoms, improved sleep and quality of life, and prevention of bone loss)  -Reproductive factors -Earlier menarche (before age 12) or later menopause (after age 52), Nulliparity, Increasing age at first full-term pregnancy.   (Of note, It is estimated that for every 12 months of  breastfeeding, there was a 4.3 percent reduction in the relative risk (RR) of breast cancer)  -Personal and family history of breast cancer  -Alcohol use and smoking  -Exposure to therapeutic ionizing radiation           2. Risk stratifying models:  There are several models available for stratifying breast cancer risk, and Rebecca is presently the model utilized by [facility] and is a model recommended per current NCCN guidelines.      Educational videos:   What Is a TC Score?    https://youtu.be/Exuz9AHYxqY     What If I Have a High-Risk TC Score?     https://youtu.be/lGs9oGs2b9R      The Sara Model for Breast Cancer risk estimates the absolute 5 year risk and lifetime risk of developing breast cancer. Family history includes only first degree relatives with breast cancer, which is not enough information to estimate the risk of a patient having BRCA mutation. It also underestimates the cancer risk for patients with extensive family history. The Sara Model is a good predictor of risk for populations but not for individuals. It adjusts risk for race/ethnicity. It may underestimate breast cancer risk in patients with atypical hyperplasia and strong family history. The Sara Model was NOT designed to estimate risk for: Women with a prior diagnosis of breast cancer, lobular carcinoma in situ (LCIS), or ductal carcinoma in situ (DCIS);  Women who have received previous radiation therapy to the chest for treatment of Hodgkin lymphoma;  Women with gene mutations in BRCA1 or BRCA2, or those who are known to have certain genetic syndromes that increase risk for breast cancer; Women of age <35 or >85.    We discussed that there are limitations to every model for risk assessment, particularly that TC can overestimate risk in women with atypical hyperplasia and dense breasts and that Sara underestimates risk for those with a strong family history of breast or ovarian cancers as well as non-white women with  atypical hyperplasia which can make them appear to not be candidates for risk reducing therapies.               3. High risk patients:       INCREASED RISK SCREENING: per NCCN                      MRI breast:       The use of MRI for breast cancer detection is based on the concept of  tumor angiogenesis or neovascularity. Tumor-associated blood vessels have increased permeability, which leads to prompt uptake and release of gadolinium within the first one to two minutes after administration, leading to a pattern of rapid enhancement and washout on MRI.   Bilateral breast examination - Both breasts should be evaluated in an MRI study, for comparison purposes, even when concern about possible pathology involves only one breast.  Contrast - Intravenous gadolinium contrast must be used to maximize cancer detection and is administered before breast MRI to highlight the neovascularity associated with cancers. Contrast is not necessary when the study is performed to evaluate silicone implant integrity.  Allergic and anaphylactoid reactions to gadolinium are rare, but can occur. In addition, in patients with renal failure, gadolinium can cause contrast nephropathy and/or nephrogenic systemic fibrosis.   A few studies have also reported gadolinium deposition in the brain from repeated intravenous administration, with the degree of deposition varying based on the specific contrast agent. The clinical significance of this deposition remains unknown, and no data for humans exist to show any adverse effects or harm at this time.   https://www.fda.gov/drugs/drug-safety-and-availability/fda-drug-safety-communication-fda-identifies-no-harmful-effects-date-brain-retention-gadolinium  https://www.fda.gov/Drugs/DrugSafety/jxs303680.htm    -Contact insurance company with regards to coverage of MRI breasts.   -Cannot undergo an MRI if pregnant.   -MRI's may have false positives                 HELLEN (contrast enhanced mammogram):  HELLEN is  the main alternative for anyone who benefits by but cannot have a breast MRI with IV contrast.    Main indications:   High risk screening   Suspicious clinical symptoms with inconclusive mammogram and ultrasound   New breast cancer, evaluate extent of disease   Pre and post jim-adjuvant systemic therapy evaluation     For high-risk screening, HELLEN replaces the regular non-contrast mammogram and any other supplemental test         For age over 75 years, screening recommendations are considered on an individual basis.     Note: New American College of Radiology® (ACR®) breast cancer screening guidelines  now call for all women -- particularly Black and Ashkenazi Confucianism women -- to have risk assessment by age 25 to determine if screening earlier than age 40 is needed. The ACR continues to recommend annual screening starting at age 40 for women of average risk, but earlier and more intensive screening for high-risk patients. The new ACR guidelines  for high-risk women were published online May 3 in the Journal of the American College of Radiology (JACR ).      Early detection decreases breast cancer death. The ACR recommends annual screening beginning at age 40 for women of average risk and earlier and/or more intensive screening for women at higher-than-average risk. For most women at higher-than-average risk, the supplemental screening method of choice is breast MRI. Women with genetics-based increased risk, those with a calculated lifetime risk of 20% or more, and those exposed to chest radiation at young ages are recommended to undergo MRI surveillance starting at ages 25 to 30 and annual mammography (with a variable starting age between 25 and 40, depending on the type of risk). Mutation carriers can delay mammographic screening until age 40 if annual screening breast MRI is performed as recommended. Women diagnosed with breast cancer before age 50 or with personal histories of breast cancer and dense breasts should  undergo annual supplemental breast MRI. Others with personal histories, and those with atypia at biopsy, should strongly consider MRI screening, especially if other risk factors are present. For women with dense breasts who desire supplemental screening, breast MRI is recommended. For those who qualify for but cannot undergo breast MRI, contrast-enhanced mammography or ultrasound could be considered. All women should undergo risk assessment by age 25, especially Black women and women of Ashkenazi Voodoo heritage, so that those at higher-than-average risk can be identified and appropriate screening initiated.      -RECOMMENDED LIFESTYLE MODIFICATIONS FOR HIGH RISK PATIENTS:    * Reviewed Lifestyle modifications which have shown benefit:  Limit alcohol consumption to less than 1 drink per day (1 ounce liquor, 6 oz wine, 8 oz beer)  Avoid smoking.  Exercise at least 150 minutes per week of moderate intensity aerobic activity or at least 75 minutes of vigorous activity. Exercise can lower the relative risk of breast cancer by ~18-20%.  Maintain healthy weight and avoid post-menopausal weight gain. Avoid processed foods and eat more lean proteins, fruits and vegetables.                               * Available resources include genetic counseling, nutrition, weight management.           -CHEMOPREVENTION:              * For women at high risk for breast cancer, endocrine therapy can reduce the risk of invasive and/or in situ breast cancers. (tamoxifen for premenopausal or postmenopausal women and raloxifene or exemestane for postmenopausal women).   -Tamoxifen 20 mg daily for 5 years has shown to reduce risk of breast cancer by 49% and women with ADH/ALH or LCIS have an even more significant reduction of risk of 86%. Aromatase inhibitors for 5 years have also shown risk reduction in terms of 50-60%. At current, there is not adequate data to recommend longer courses of therapy more than 5 years for risk reduction.     -Above have been shown to lower the risk of breast cancer incidence, however there is no survival benefit in patients who don't have breast cancer.   -Tamoxifen has limited data in BRCA 1/2 mutation carriers but limited retrospective data is suggestive of benefit. There is retrospective data that aromatase inhibitors can reduce the risk of contralateral ER positive breast cancers in BRCA 1/2 patients who were taking AIs as adjuvant therapy. There is no data for raloxifen in the population.    -Risks of Tamoxifen side effects include hot flashes, invasive endometrial cancer in women > 49 years of age (2.3/1000 compared to 0.9/1000), cataracts, increased risk of pulmonary embolism among others.              Assessment:     1. Mass of left breast, unspecified quadrant    2. Family history of breast cancer          Breast Cancer Risk Stratification   Current, Estimated Breast Cancer Risk Model Used Patient's Score Risk for general population Patient's Risk Category   5-year Alfredo Model 0.3% 0.3%  [] N/A given age <35   [x] Average risk (<1.7%)   [] Increased risk (?1.7%)   10-year Tyrer-Cuzick v8.0b 1.2% 1.0%  [x] <5%   [] ?5%    Lifetime (to age 85) Tyrer-Cuzick v8.0b 13.7% 11.1%  [x] Average risk (<15%)   [] Intermediate risk (?15% - <20%)   [] High risk (?20%)   According to the American Cancer Society, patients with a lifetime breast cancer risk of 20% or higher might benefit from supplemental screening exams. Women with a 5-year risk greater than or equal to 1.7% may benefit from chemoprevention agents (tamoxifen, raloxifene, aromatase inhibitors) to reduce risk.        Patient's risk factors include but are not limited to:  Nulliparity   Family history    Plan:     Patient is not in high risk for developing a breast cancer according to her TC and alfredo model scores. We would recommend starting annual MMG at 40, however, she is reporting a self palpated lump. Will order diagnostic MMG.   Asked her to follow up  with her PCP or gyn.   Encouraged breast awareness, including monthly breast self-exams.   Recommend lifestyle modifications as above.     RTC prn  Questions were encouraged and answered to patient's satisfaction, and patient verbalized understanding of information and agreement with the plan. Advised patient to RTC with any interval changes or concerns.    Route Chart for Scheduling    Med Onc Chart Routing  Urgent    Follow up with physician    Follow up with [facility] No follow up needed.   Infusion scheduling note    Injection scheduling note    Labs    Imaging   Needs a diagnostic mmg and US asap please.   Pharmacy appointment    Other referrals                     Patient is in agreement with the proposed treatment plan. All questions were answered to the patient's satisfaction. Pt knows to call clinic for any new or worsening symptoms and if anything is needed before the next clinic visit.    Mariela Tan, MSN, APRN, FNP-C  Nurse Practitioner to Dr. Marla Conti  [facility]  [facility]  Hematology & Medical Oncology  [address] [place] [id number]  ph. [phone] ext [phone]  Fax. [phone]

## 2023-11-29 ENCOUNTER — OFFICE VISIT (OUTPATIENT)
Dept: HEMATOLOGY/ONCOLOGY | Facility: CLINIC | Age: 33
End: 2023-11-29
Payer: COMMERCIAL

## 2023-11-29 DIAGNOSIS — N63.20 MASS OF LEFT BREAST, UNSPECIFIED QUADRANT: Primary | ICD-10-CM

## 2023-11-29 DIAGNOSIS — Z80.3 FAMILY HISTORY OF BREAST CANCER: ICD-10-CM

## 2023-11-29 PROCEDURE — 99204 PR OFFICE/OUTPT VISIT, NEW, LEVL IV, 45-59 MIN: ICD-10-PCS | Mod: 95,,, | Performed by: NURSE PRACTITIONER

## 2023-11-29 PROCEDURE — 99204 OFFICE O/P NEW MOD 45 MIN: CPT | Mod: 95,,, | Performed by: NURSE PRACTITIONER

## 2023-11-29 NOTE — PATIENT INSTRUCTIONS
General Education discussed:      1. General education: (not patient specific)    Risk factors associated with breast cancer are categorized into 2 groups: Modifiable and Non-modifiable. Modifiable risk factors include use of hormones, alcohol, smoking, diet and exercise. Non-modifiable risk factors include breast density, genetics, chest radiation, previous pregnancies, age of first period, and age of menopause.     Factors associated with greater breast cancer risk: (this list is not patient specific)  -Increasing age The risk of breast cancer increases with older age.  -Female sex  -White race (In the [place], the highest breast cancer risk occurs among White women, although breast cancer remains the most common cancer among women of every major ethnic/racial group )  -Weight and body fat in postmenopausal women -Obesity (defined as body mass index [BMI] ?30 kg/m2) is associated with an overall increase  in morbidity and mortality. However, the risk of breast cancer associated with BMI differs by menopausal status.   ?Postmenopausal women - A higher BMI and/or perimenopausal weight gain have been consistently associated with a higher risk of breast cancer among postmenopausal women. The association between a higher BMI and postmenopausal breast cancer risk may be mediated by higher estrogen levels resulting from the peripheral conversion of estrogen precursors (from adipose tissue) to estrogen    ?Inverse relationship in premenopausal women - Unlike postmenopausal women, an increased BMI is associated with a lower risk of breast cancer in premenopausal women, particularly in early adulthood.  The explanation of this finding remains unclear.  -Tall stature -women who were >175 cm (69 inches) tall were 20 percent more likely to develop breast cancer than those <160 cm (63 inches) tall.  -Benign breast disease  -Dense breast tissue -- The density of breast tissue reflects the relative amount of glandular  and connective tissue (parenchyma) to adipose tissue. Women with mammographically dense breast tissue, generally defined as dense tissue comprising ?75 percent of the breast, have a four to five times higher breast cancer risk compared with women of similar age with less or no dense tissue. Although breast density is a largely inherited trait, other factors can influence density. For example, lower density has been associated with higher levels of physical activity  and with a low-fat, high-carbohydrate diet. In postmenopausal women, estrogen and progesterone increase breast density  while the ER antagonist tamoxifen decreases breast density.  Despite the association of exogenous hormones with breast density, breast density is not strongly correlated with endogenous hormone levels. Breast tend to become more fatty with age.   -Bone mineral density -In multiple studies, women with higher bone density have a higher breast cancer risk  -Hormonal factors: HRT. Of note, IVF does not appear to increase the long-term risk of breast cancer, even in women with BRCA 1 and 2 mutations.     In the Women's Health Initiative (WHI), the risk of invasive breast cancer was significantly increased with combined hormone therapy (HT) at an average follow-up of 5.6 years.     A 2019 meta-analysis of all available epidemiologic evidence on the association between menopausal hormone therapy (MHT) use and breast cancer risk has been published. The analysis included nearly 145,000 women with breast cancer (51 percent of whom had used MHT) and nearly 425,000 without breast cancer. Their findings included:  ?Similar to the WHI, estrogen-progestin regimens were associated with excess breast cancer risk. An excess risk was also seen with estrogen-only regimens (a reduction in risk was seen in the WHI). There was no excess risk with vaginal estrogens.   ?Breast cancer risk increased with the duration of systemic MHT use. Unlike previous studies,  obesity was not associated with excess risk; instead, it attenuated risk.  ?The authors of the study calculated that for women of average weight, five years of MHT use starting at age 50 years would increase their 20-year risk of breast cancer (between the ages of 50 and 69 years) by approximately:  One in every 50 users of estrogen plus daily progestin  One in every 70 users of estrogen plus intermittent progestin   One in every 200 users of estrogen-only regimens   Of note: There were important limitations in this study.   While this meta-analysis has renewed concerns for some about the association between MHT and breast cancer, we continue to suggest an individualized approach when counseling symptomatic postmenopausal women about treatment. This includes putting the potential risk of breast cancer (and cardiovascular disease) in the context of the benefits of MHT (eg, relief of vasomotor symptoms, improved sleep and quality of life, and prevention of bone loss)  -Reproductive factors -Earlier menarche (before age 12) or later menopause (after age 52), Nulliparity, Increasing age at first full-term pregnancy.   (Of note, It is estimated that for every 12 months of breastfeeding, there was a 4.3 percent reduction in the relative risk (RR) of breast cancer)  -Personal and family history of breast cancer  -Alcohol use and smoking  -Exposure to therapeutic ionizing radiation           2. Risk stratifying models:  There are several models available for stratifying breast cancer risk, and Rebecca is presently the model utilized by [facility] and is a model recommended per current NCCN guidelines.      Educational videos:   What Is a TC Score?    https://youtu.be/Oynk9PDMuvQ     What If I Have a High-Risk TC Score?     https://youtu.be/[email or web address]      The Sara Model for Breast Cancer risk estimates the absolute 5 year risk and lifetime risk of developing breast cancer. Family history includes only  first degree relatives with breast cancer, which is not enough information to estimate the risk of a patient having BRCA mutation. It also underestimates the cancer risk for patients with extensive family history. The Sara Model is a good predictor of risk for populations but not for individuals. It adjusts risk for race/ethnicity. It may underestimate breast cancer risk in patients with atypical hyperplasia and strong family history. The Sara Model was NOT designed to estimate risk for: Women with a prior diagnosis of breast cancer, lobular carcinoma in situ (LCIS), or ductal carcinoma in situ (DCIS);  Women who have received previous radiation therapy to the chest for treatment of Hodgkin lymphoma;  Women with gene mutations in BRCA1 or BRCA2, or those who are known to have certain genetic syndromes that increase risk for breast cancer; Women of age <35 or >85.    We discussed that there are limitations to every model for risk assessment, particularly that TC can overestimate risk in women with atypical hyperplasia and dense breasts and that Sara underestimates risk for those with a strong family history of breast or ovarian cancers as well as non-white women with atypical hyperplasia which can make them appear to not be candidates for risk reducing therapies.               3. High risk patients:       INCREASED RISK SCREENING: per NCCN                      MRI breast:       The use of MRI for breast cancer detection is based on the concept of  tumor angiogenesis or neovascularity. Tumor-associated blood vessels have increased permeability, which leads to prompt uptake and release of gadolinium within the first one to two minutes after administration, leading to a pattern of rapid enhancement and washout on MRI.   Bilateral breast examination - Both breasts should be evaluated in an MRI study, for comparison purposes, even when concern about possible pathology involves only one breast.  Contrast - Intravenous  gadolinium contrast must be used to maximize cancer detection and is administered before breast MRI to highlight the neovascularity associated with cancers. Contrast is not necessary when the study is performed to evaluate silicone implant integrity.  Allergic and anaphylactoid reactions to gadolinium are rare, but can occur. In addition, in patients with renal failure, gadolinium can cause contrast nephropathy and/or nephrogenic systemic fibrosis.   A few studies have also reported gadolinium deposition in the brain from repeated intravenous administration, with the degree of deposition varying based on the specific contrast agent. The clinical significance of this deposition remains unknown, and no data for humans exist to show any adverse effects or harm at this time.   https://www.fda.gov/drugs/drug-safety-and-availability/fda-drug-safety-communication-fda-identifies-no-harmful-effects-date-brain-retention-gadolinium  https://www.fda.gov/Drugs/DrugSafety/qvd794800.htm    -Contact insurance company with regards to coverage of MRI breasts.   -Cannot undergo an MRI if pregnant.   -MRI's may have false positives                 HELLEN (contrast enhanced mammogram):  HELLEN is the main alternative for anyone who benefits by but cannot have a breast MRI with IV contrast.    Main indications:   High risk screening   Suspicious clinical symptoms with inconclusive mammogram and ultrasound   New breast cancer, evaluate extent of disease   Pre and post jim-adjuvant systemic therapy evaluation     For high-risk screening, HELLEN replaces the regular non-contrast mammogram and any other supplemental test         For age over 75 years, screening recommendations are considered on an individual basis.     Note: New American College of Radiology® (ACR®) breast cancer screening guidelines  now call for all women -- particularly Black and Ashkenazi Denominational women -- to have risk assessment by age 25 to determine if screening earlier than age  40 is needed. The ACR continues to recommend annual screening starting at age 40 for women of average risk, but earlier and more intensive screening for high-risk patients. The new ACR guidelines  for high-risk women were published online May 3 in the Journal of the American College of Radiology (JACR ).      Early detection decreases breast cancer death. The ACR recommends annual screening beginning at age 40 for women of average risk and earlier and/or more intensive screening for women at higher-than-average risk. For most women at higher-than-average risk, the supplemental screening method of choice is breast MRI. Women with genetics-based increased risk, those with a calculated lifetime risk of 20% or more, and those exposed to chest radiation at young ages are recommended to undergo MRI surveillance starting at ages 25 to 30 and annual mammography (with a variable starting age between 25 and 40, depending on the type of risk). Mutation carriers can delay mammographic screening until age 40 if annual screening breast MRI is performed as recommended. Women diagnosed with breast cancer before age 50 or with personal histories of breast cancer and dense breasts should undergo annual supplemental breast MRI. Others with personal histories, and those with atypia at biopsy, should strongly consider MRI screening, especially if other risk factors are present. For women with dense breasts who desire supplemental screening, breast MRI is recommended. For those who qualify for but cannot undergo breast MRI, contrast-enhanced mammography or ultrasound could be considered. All women should undergo risk assessment by age 25, especially Black women and women of Ashkenazi Anabaptism heritage, so that those at higher-than-average risk can be identified and appropriate screening initiated.      -RECOMMENDED LIFESTYLE MODIFICATIONS FOR HIGH RISK PATIENTS:    * Reviewed Lifestyle modifications which have shown benefit:  Limit alcohol  consumption to less than 1 drink per day (1 ounce liquor, 6 oz wine, 8 oz beer)  Avoid smoking.  Exercise at least 150 minutes per week of moderate intensity aerobic activity or at least 75 minutes of vigorous activity. Exercise can lower the relative risk of breast cancer by ~18-20%.  Maintain healthy weight and avoid post-menopausal weight gain. Avoid processed foods and eat more lean proteins, fruits and vegetables.                               * Available resources include genetic counseling, nutrition, weight management.           -CHEMOPREVENTION:              * For women at high risk for breast cancer, endocrine therapy can reduce the risk of invasive and/or in situ breast cancers. (tamoxifen for premenopausal or postmenopausal women and raloxifene or exemestane for postmenopausal women).   -Tamoxifen 20 mg daily for 5 years has shown to reduce risk of breast cancer by 49% and women with ADH/ALH or LCIS have an even more significant reduction of risk of 86%. Aromatase inhibitors for 5 years have also shown risk reduction in terms of 50-60%. At current, there is not adequate data to recommend longer courses of therapy more than 5 years for risk reduction.    -Above have been shown to lower the risk of breast cancer incidence, however there is no survival benefit in patients who don't have breast cancer.   -Tamoxifen has limited data in BRCA 1/2 mutation carriers but limited retrospective data is suggestive of benefit. There is retrospective data that aromatase inhibitors can reduce the risk of contralateral ER positive breast cancers in BRCA 1/2 patients who were taking AIs as adjuvant therapy. There is no data for raloxifen in the population.    -Risks of Tamoxifen side effects include hot flashes, invasive endometrial cancer in women > 49 years of age (2.3/1000 compared to 0.9/1000), cataracts, increased risk of pulmonary embolism among others.              Assessment:     1. Mass of left breast,  unspecified quadrant          Breast Cancer Risk Stratification   Current, Estimated Breast Cancer Risk Model Used Patient's Score Risk for general population Patient's Risk Category   5-year Alfredo Model 0.3% 0.3%  [] N/A given age <35   [x] Average risk (<1.7%)   [] Increased risk (?1.7%)   10-year Tyrer-Cuzick v8.0b 1.2% 1.0%  [x] <5%   [] ?5%    Lifetime (to age 85) Tyrer-Cuzick v8.0b 13.7% 11.1%  [x] Average risk (<15%)   [] Intermediate risk (?15% - <20%)   [] High risk (?20%)   According to the American Cancer Society, patients with a lifetime breast cancer risk of 20% or higher might benefit from supplemental screening exams. Women with a 5-year risk greater than or equal to 1.7% may benefit from chemoprevention agents (tamoxifen, raloxifene, aromatase inhibitors) to reduce risk.        Patient's risk factors include but are not limited to:  Nulliparity   Family history    Plan:     Patient is not in high risk for developing a breast cancer according to her TC and alfredo model scores. We would recommend starting annual MMG at 40, however, she is reporting a self palpated lump. Will order diagnostic MMG.   Asked her to follow up with her PCP or gyn.   Encouraged breast awareness, including monthly breast self-exams.   Recommend lifestyle modifications as above.     RTC prn

## 2023-12-22 ENCOUNTER — HOSPITAL ENCOUNTER (OUTPATIENT)
Dept: RADIOLOGY | Facility: OTHER | Age: 33
Discharge: HOME | End: 2023-12-22
Attending: NURSE PRACTITIONER
Payer: COMMERCIAL

## 2023-12-22 DIAGNOSIS — R92.8 ABNORMAL MAMMOGRAM: ICD-10-CM

## 2023-12-22 DIAGNOSIS — N63.20 MASS OF LEFT BREAST, UNSPECIFIED QUADRANT: ICD-10-CM

## 2023-12-22 PROCEDURE — 77062 BREAST TOMOSYNTHESIS BI: CPT | Mod: 26,,, | Performed by: RADIOLOGY

## 2023-12-22 PROCEDURE — 88305 TISSUE EXAM BY PATHOLOGIST: CPT | Performed by: STUDENT IN AN ORGANIZED HEALTH CARE EDUCATION/TRAINING PROGRAM

## 2023-12-22 PROCEDURE — 27201068 US BIOPSY LYMPH NODE AXILLA

## 2023-12-22 PROCEDURE — 88341 PR IHC OR ICC EACH ADD'L SINGLE ANTIBODY  STAINPR: ICD-10-PCS | Mod: 26,59,, | Performed by: STUDENT IN AN ORGANIZED HEALTH CARE EDUCATION/TRAINING PROGRAM

## 2023-12-22 PROCEDURE — 88342 IMHCHEM/IMCYTCHM 1ST ANTB: CPT | Mod: 26,59,, | Performed by: STUDENT IN AN ORGANIZED HEALTH CARE EDUCATION/TRAINING PROGRAM

## 2023-12-22 PROCEDURE — 77062 MAMMO DIGITAL DIAGNOSTIC BILAT WITH TOMO: ICD-10-PCS | Mod: 26,,, | Performed by: RADIOLOGY

## 2023-12-22 PROCEDURE — 38505 US BIOPSY LYMPH NODE AXILLA: ICD-10-PCS | Mod: 59,LT,, | Performed by: RADIOLOGY

## 2023-12-22 PROCEDURE — 77065 DX MAMMO INCL CAD UNI: CPT | Mod: TC,LT

## 2023-12-22 PROCEDURE — 88305 TISSUE EXAM BY PATHOLOGIST: CPT | Mod: 26,,, | Performed by: STUDENT IN AN ORGANIZED HEALTH CARE EDUCATION/TRAINING PROGRAM

## 2023-12-22 PROCEDURE — 88341 IMHCHEM/IMCYTCHM EA ADD ANTB: CPT | Mod: 26,59,, | Performed by: STUDENT IN AN ORGANIZED HEALTH CARE EDUCATION/TRAINING PROGRAM

## 2023-12-22 PROCEDURE — 76642 US BREAST LEFT LIMITED: ICD-10-PCS | Mod: 26,LT,, | Performed by: RADIOLOGY

## 2023-12-22 PROCEDURE — 25000003 PHARM REV CODE 250: Performed by: NURSE PRACTITIONER

## 2023-12-22 PROCEDURE — 76642 ULTRASOUND BREAST LIMITED: CPT | Mod: TC,LT

## 2023-12-22 PROCEDURE — 76642 ULTRASOUND BREAST LIMITED: CPT | Mod: 26,LT,, | Performed by: RADIOLOGY

## 2023-12-22 PROCEDURE — 19083 BX BREAST 1ST LESION US IMAG: CPT | Mod: LT,,, | Performed by: RADIOLOGY

## 2023-12-22 PROCEDURE — 88360 PR  TUMOR IMMUNOHISTOCHEM/MANUAL: ICD-10-PCS | Mod: 26,,, | Performed by: STUDENT IN AN ORGANIZED HEALTH CARE EDUCATION/TRAINING PROGRAM

## 2023-12-22 PROCEDURE — 88360 TUMOR IMMUNOHISTOCHEM/MANUAL: CPT | Mod: 59 | Performed by: STUDENT IN AN ORGANIZED HEALTH CARE EDUCATION/TRAINING PROGRAM

## 2023-12-22 PROCEDURE — 27201068 US BREAST BIOPSY WITH IMAGING 1ST SITE LEFT

## 2023-12-22 PROCEDURE — 77066 DX MAMMO INCL CAD BI: CPT | Mod: 26,,, | Performed by: RADIOLOGY

## 2023-12-22 PROCEDURE — 77066 MAMMO DIGITAL DIAGNOSTIC BILAT WITH TOMO: ICD-10-PCS | Mod: 26,,, | Performed by: RADIOLOGY

## 2023-12-22 PROCEDURE — 77066 DX MAMMO INCL CAD BI: CPT | Mod: TC

## 2023-12-22 PROCEDURE — 88341 IMHCHEM/IMCYTCHM EA ADD ANTB: CPT | Mod: 59 | Performed by: STUDENT IN AN ORGANIZED HEALTH CARE EDUCATION/TRAINING PROGRAM

## 2023-12-22 PROCEDURE — 88305 TISSUE EXAM BY PATHOLOGIST: ICD-10-PCS | Mod: 26,,, | Performed by: STUDENT IN AN ORGANIZED HEALTH CARE EDUCATION/TRAINING PROGRAM

## 2023-12-22 PROCEDURE — 38505 NEEDLE BIOPSY LYMPH NODES: CPT | Mod: 59,LT,, | Performed by: RADIOLOGY

## 2023-12-22 PROCEDURE — 19083 US BREAST BIOPSY WITH IMAGING 1ST SITE LEFT: ICD-10-PCS | Mod: LT,,, | Performed by: RADIOLOGY

## 2023-12-22 PROCEDURE — 88360 TUMOR IMMUNOHISTOCHEM/MANUAL: CPT | Mod: 26,,, | Performed by: STUDENT IN AN ORGANIZED HEALTH CARE EDUCATION/TRAINING PROGRAM

## 2023-12-22 PROCEDURE — 88342 CHG IMMUNOCYTOCHEMISTRY: ICD-10-PCS | Mod: 26,59,, | Performed by: STUDENT IN AN ORGANIZED HEALTH CARE EDUCATION/TRAINING PROGRAM

## 2023-12-22 PROCEDURE — 88342 IMHCHEM/IMCYTCHM 1ST ANTB: CPT | Mod: 59 | Performed by: STUDENT IN AN ORGANIZED HEALTH CARE EDUCATION/TRAINING PROGRAM

## 2023-12-22 RX ORDER — LIDOCAINE HYDROCHLORIDE AND EPINEPHRINE 20; 10 MG/ML; UG/ML
10 INJECTION, SOLUTION INFILTRATION; PERINEURAL ONCE
Status: COMPLETED | OUTPATIENT
Start: 2023-12-22 | End: 2023-12-22

## 2023-12-22 RX ORDER — LIDOCAINE HYDROCHLORIDE 10 MG/ML
5 INJECTION INFILTRATION; PERINEURAL ONCE
Status: COMPLETED | OUTPATIENT
Start: 2023-12-22 | End: 2023-12-22

## 2023-12-22 RX ADMIN — LIDOCAINE HYDROCHLORIDE 5 ML: 10 INJECTION, SOLUTION EPIDURAL; INFILTRATION; INTRACAUDAL at 11:12

## 2023-12-22 RX ADMIN — LIDOCAINE HYDROCHLORIDE,EPINEPHRINE BITARTRATE 10 ML: 20; .01 INJECTION, SOLUTION INFILTRATION; PERINEURAL at 11:12

## 2023-12-29 ENCOUNTER — PATIENT MESSAGE (OUTPATIENT)
Dept: HEMATOLOGY/ONCOLOGY | Facility: CLINIC | Age: 33
End: 2023-12-29
Payer: COMMERCIAL

## 2024-01-02 ENCOUNTER — TELEPHONE (OUTPATIENT)
Dept: RADIOLOGY | Facility: OTHER | Age: 34
End: 2024-01-02
Payer: COMMERCIAL

## 2024-01-02 LAB
FINAL PATHOLOGIC DIAGNOSIS: NORMAL
GROSS: NORMAL
Lab: NORMAL

## 2024-01-02 NOTE — TELEPHONE ENCOUNTER
Patient notified of left breast biopsy results per pathology reported on 1/2/2024. Diagnosis: INVASIVE CARCINOMA. Explained to patient results are positive for breast cancer. Left axilla node negative for metastatic carcinoma. Instructed on need for consultation with breast surgeon. Pt inquired about staging. Reviewed that the path results do no provide staging and there are multiple factors that staging takes into account which will be reviewed in further detail at her initial appointment with the breast clinic. Appointment with breast surgeon requested. Office will call patient directly to confirm appointment. Patient verbalized understanding. Biopsy site with some swelling, denies drainage, fever or skin changes. Reports bruise is resolving. Encouraged to call [phone] for further questions or concerns.

## 2024-01-03 ENCOUNTER — DOCUMENTATION ONLY (OUTPATIENT)
Dept: SURGERY | Facility: CLINIC | Age: 34
End: 2024-01-03
Payer: COMMERCIAL

## 2024-01-03 DIAGNOSIS — F41.9 ANXIETY DUE TO INVASIVE PROCEDURE: Primary | ICD-10-CM

## 2024-01-03 DIAGNOSIS — C50.919 TRIPLE NEGATIVE MALIGNANT NEOPLASM OF BREAST: Primary | ICD-10-CM

## 2024-01-03 RX ORDER — DIAZEPAM 2 MG/1
2 TABLET ORAL
Qty: 2 TABLET | Refills: 0 | Status: SHIPPED | OUTPATIENT
Start: 2024-01-03 | End: 2024-02-19

## 2024-01-03 NOTE — NURSING
Called pt to review location, date and time of [facility] appts on 1/11/24.  Provided my direct contact informaton and encouraged her to call for any assistance.  Pt verbalized understanding.  Oncology Navigation   Intake  Date of Diagnosis: 12/22/23  Cancer Type: Breast  Internal / External Referral: Internal  Date of Referral: 11/29/23  Initial Nurse Navigator Contact: 01/03/24  Referral to Initial Contact Timeline (days): 35  Date Worked: 01/03/24  First Appointment Available: 01/11/24  Appointment Date: 01/11/24  First Available Date vs. Scheduled Date (days): 0     Treatment  Current Status: Staging work-up    Surgical Oncologist: Swetha  Consult Date: 01/11/24    Medical Oncologist: Savita  Consult Date: 01/11/24    Radiation Oncologist: Kelsi    Procedures: Biopsy; Diagnostic Mammogram; MRI; Ultrasound  Biopsy Schedule Date: 12/22/23  Diagnostic Mammo Schedule Date: 12/22/23  MRI Schedule Date: 01/10/24  Ultrasound Schedule Date: 12/22/23       ER: Negative  AR: Negative  Her2: Negative    Radiation Oncologist: Kelsi    Support Systems: Family members     Acuity  Treatment Tolerability: Has not started treatment yet/treatment fully completed and side effects resolved  Hospitalization Within the Past Month: 0   Needed: 0  Support: 0  Verbalizes Financial Concerns: 0  Transportation: 0  History of noncompliance/frequent no shows and cancellations: 0  Verbalizes the need for more education: 0  Navigation Acuity: 0     Follow Up  No follow-ups on file.

## 2024-01-08 NOTE — PROGRESS NOTES
[facility]/ [facility]      Chief Complaint: TNBC      Cancer Staging   Carcinoma of upper-inner quadrant of left breast in female, estrogen receptor negative  Staging form: Breast, AJCC 8th Edition  - Clinical stage from 2024: Stage IIB (cT2, cN0, cM0, G3, ER-, MD-, HER2-) - Signed by Vannessa Barney MD on 2024      HPI:  Alisha Cardenas is a 34yo woman who presents today for evaluation of newly diagnosed breast cancer. Her oncologic history is as follows:    -Presented w palpable L breast mass that she first noticed around [facility]. Diagnostic MMG 23 reveals a 34 x 33 x 32mm in the L breast at 10oclock. Enlarged axillary LN measuring 18 x 15 x 14mm. No concerning Rt breast findings. Subsequent biopsy showing poorly differentiated invasive carcinoma, grade 3. ER negative, MD negative, Her2 0, Ki67 >90%. LN negative for metastatic carcinoma.   -MRI breast 1/10/23 pending    Today Alisha presents accompanied by her mother. Denies significant pmh. FH notable for a cousin dx with breast cancer at 32yo and aunt with breast cancer. She lives in [place] with her 21yo niece. Works in childcare.     Gyn History:   Menarche: 10yo  Menopause: N/A LMP 2023     : No  HRT: No    Social History     Tobacco Use    Smoking status: Never    Smokeless tobacco: Never   Substance Use Topics    Alcohol use: Yes     Comment: weekends    Drug use: No     Family History   Problem Relation Age of Onset    No Known Problems Mother     No Known Problems Father     Vaginal cancer Neg Hx     Endometrial cancer Neg Hx     Cervical cancer Neg Hx      Past Medical History:   Diagnosis Date    Asthma     reports as a child    Astigmatism of left eye     Complete spontaneous  2014 9:22:39 AM    [facility]: , spontaneous, complete-No Additional Notes    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  "2014 9:22:57 AM    [facility] - [facility]: , habitual w/o pregnancy-No Additional Notes    Missed  2014 9:22:49 AM    [facility] - [facility]: , Missed, Less than 22 Weeks-No Additional Notes    Prior miscarriage with pregnancy in first trimester, antepartum     Prior miscarriage with pregnancy in first trimester, antepartum     Prior miscarriage with pregnancy in second trimester, antepartum      Past Surgical History:   Procedure Laterality Date    DILATION AND CURETTAGE OF UTERUS      MYOMECTOMY N/A 2022    Procedure: MYOMECTOMY;  Surgeon: Renzo Montoya III, MD;  Location: [facility];  Service: OB/GYN;  Laterality: N/A;    none      TONSILLECTOMY         Patient Active Problem List   Diagnosis    Acute anemia    Viral syndrome    S/P abdominal myomectomy       Current Outpatient Medications   Medication Instructions    cetirizine HCl (ZYRTEC ORAL) Oral, Daily    diazePAM (VALIUM) 2 mg, Oral, On Call Procedure    multivit,calc,mins/iron/folic (WOMEN'S ONE DAILY ORAL) Oral, Daily       Review of Systems:   Answers submitted by the patient for this visit:  Review of Systems Questionnaire (Submitted on 2024)  appetite change : No  unexpected weight change: No  mouth sores: No  visual disturbance: Yes  cough: No  shortness of breath: Yes  chest pain: No  abdominal pain: No  diarrhea: No  frequency: Yes  back pain: No  rash: No  headaches: Yes  adenopathy: No  nervous/ anxious: Yes      PHYSICAL EXAM:  BP (!) 141/70   Pulse 84   Temp 98.4 °F (36.9 °C) (Oral)   Resp 18   Ht 5' 1" (1.549 m)   Wt 84.4 kg (186 lb 1.1 oz)   LMP 2023 (Exact Date)   SpO2 97%   BMI 35.16 kg/m²     ECOG 0  General: well appearing, in no apparent distress  HEENT: Normocephalic, EOMI, anicteric sclerae, MMM  Neck: supple, without cervical or supraclavicular lymphadenopathy.  Heart: regular rate and rhythm, normal S1 and S2, no murmurs, gallops or rubs.  Lungs: Clear to " auscultation bilaterally, no increased wob  Breast: L breast with central palpable mass w +L axillary LAD. No Rt breast masses  Abdomen: Soft, nontender, nondistended with normal bowel sounds. No hepatosplenomegaly.  Extremities: No LE edema or joint effusion  Skin: warm, well-perfused, no rash  Neurologic: Alert and oriented x 4, normal speech and gait   Psychiatric: Conversing appropriately with providers throughout today's encounter.      Pertinent Labs & Imaging:  Reviewed all recent labs, imaging and pathology.     Assessment & Plan:  Alisha is a can 32yo woman with recently diagnosed at least Stage IIB TNBC (cT2N0) who presents today for evaluation.    We had a long discussion about the biology, pathophysiology, epidemiology, and treatment of triple negative breast cancer. Given her cancer that is at least T2N0, with high Ki67, and that she is otherwise young and healthy, I recommended initiating carboplatin/paclitaxel/pembrolizumab followed by adriamycin/cytoxan/pembrolizumab (KEYNOTE 522 regimen).     I reviewed the plan to follow chemotherapy with surgery, and will plan to continue pembrolizumab x 9 cycles adjuvantly as per the KEYNOTE trial. We reviewed the recent data presented at [facility] 2020 showing that at 36 months, 88.6% of stage 2 patients in the KEYNOTE 522 trial who received pembrolizumab were disease-free, compared with 80.7% who received chemo alone (about an 8% absolute benefit in favor of including immunotherapy).    We discussed the dosing, logistics, supportive medications, and side effects of this regimen at length. Will proceed with weekly Carboplatin AUC 1.5 and Taxol (80 mg/m2) with pembrolizumab (200mg) every 3 weeks for 12 weeks followed by Adriamycin (60 mg/m2), Cytoxan (600 mg/m2) and Pembrolizumab (200mg) every three weeks for four cycles. She will then proceed to surgery. Following surgery, Keytruda (200mg) is resumed q3wk for the remainder of 1 year.    Side effects of  chemotherapy were reviewed and include alopecia, immunosuppression, neutropenia, anemia, thrombocytopenia, and neuropathy. Other side effects such as nail changes, dry and itchy eyes, mouth sores, and bone pain were discussed. The need for growth factor support was also discussed. Neulasta related bone pain was discussed. Rare but serious side effects such as increased risk of secondary leukemia with both Adriamycin and Cytoxan were also discussed as well. The increased risk of cardiomyopathy secondary to Adriamycin was discussed with her as well.   Immunotherapy side effects include but are not limited to: fatigue, colitis and other GI toxicities, dermatologic toxicities, endocrinopathies (including pancreatitis/hyperglycemia, hyper/hypothyroidism, hypophysitis), hematologic toxicity, nephritis, pneumonitis, hypersensitivity/infusion reaction, peripheral edema    We discussed fertility preservation and will pursue OFS with goserelin during chemotherapy.     Alisha declines upfront chemotherapy and would prefer to go to surgery first. Will plan to see her following surgery and will discuss recommendation for adjuvant AC-T at that time.     #TNBC:   --will get PET for staging  --genetic testing today  --prefers to proceed with upfront surgery as above  --RTC post-op to review final path and discuss adjuvant chemo       Advance Care Planning     Date: 01/08/2024  Patient did not wish or was not able to name a surrogate decision maker or provide an Advance Care Plan.       Reviewed patients referring notes, imaging and pathology. Discussed diagnosis, staging, and treatment in detail with patient. Will see her back post-op or sooner should the need arise.     Route Chart for Scheduling    Med Onc Chart Routing      Follow up with physician 1 month. RTC post-op- surgery not yet scheduled   Follow up with [facility]    Infusion scheduling note    Injection scheduling note    Labs None   Scheduling:  Preferred lab:  Lab  interval:     Imaging PET scan      Pharmacy appointment No pharmacy appointment needed      Other referrals no referral to Oncology Primary Care needed -  no Massage appointment needed    No additional referrals needed                        Vannessa Barney      MDM includes  :    - Acute or chronic illness or injury that poses a threat to life or bodily function  - Review of prior external notes from unique source  - Independent review and explanation of 3+ results from unique tests  - Discussion of management and ordering 3+ unique tests  - Extensive discussion of treatment and management  - Prescription drug management  - Drug therapy requiring intensive monitoring for toxicity

## 2024-01-10 ENCOUNTER — HOSPITAL ENCOUNTER (OUTPATIENT)
Dept: RADIOLOGY | Facility: OTHER | Age: 34
Discharge: HOME | End: 2024-01-10
Attending: SURGERY
Payer: COMMERCIAL

## 2024-01-10 DIAGNOSIS — C50.919 TRIPLE NEGATIVE MALIGNANT NEOPLASM OF BREAST: ICD-10-CM

## 2024-01-10 PROCEDURE — A9577 INJ MULTIHANCE: HCPCS | Performed by: SURGERY

## 2024-01-10 PROCEDURE — 77049 MRI BREAST C-+ W/CAD BI: CPT | Mod: 26,,, | Performed by: RADIOLOGY

## 2024-01-10 PROCEDURE — 25500020 PHARM REV CODE 255: Performed by: SURGERY

## 2024-01-10 PROCEDURE — 77049 MRI BREAST C-+ W/CAD BI: CPT | Mod: TC

## 2024-01-10 RX ADMIN — GADOBENATE DIMEGLUMINE 17 ML: 529 INJECTION, SOLUTION INTRAVENOUS at 04:01

## 2024-01-11 ENCOUNTER — OFFICE VISIT (OUTPATIENT)
Dept: SURGERY | Facility: CLINIC | Age: 34
End: 2024-01-11
Payer: COMMERCIAL

## 2024-01-11 ENCOUNTER — OFFICE VISIT (OUTPATIENT)
Dept: HEMATOLOGY/ONCOLOGY | Facility: CLINIC | Age: 34
End: 2024-01-11
Payer: COMMERCIAL

## 2024-01-11 ENCOUNTER — OFFICE VISIT (OUTPATIENT)
Dept: RADIATION ONCOLOGY | Facility: CLINIC | Age: 34
End: 2024-01-11
Payer: COMMERCIAL

## 2024-01-11 VITALS
RESPIRATION RATE: 18 BRPM | OXYGEN SATURATION: 97 % | HEIGHT: 61 IN | DIASTOLIC BLOOD PRESSURE: 70 MMHG | HEIGHT: 61 IN | SYSTOLIC BLOOD PRESSURE: 141 MMHG | TEMPERATURE: 98 F | SYSTOLIC BLOOD PRESSURE: 141 MMHG | BODY MASS INDEX: 35.13 KG/M2 | WEIGHT: 186.63 LBS | WEIGHT: 186 LBS | BODY MASS INDEX: 35.12 KG/M2 | HEART RATE: 84 BPM | WEIGHT: 186.06 LBS | HEART RATE: 84 BPM | DIASTOLIC BLOOD PRESSURE: 70 MMHG | OXYGEN SATURATION: 97 % | TEMPERATURE: 98 F | HEIGHT: 61 IN | BODY MASS INDEX: 35.23 KG/M2

## 2024-01-11 DIAGNOSIS — Z17.1 MALIGNANT NEOPLASM OF UPPER-INNER QUADRANT OF LEFT BREAST IN FEMALE, ESTROGEN RECEPTOR NEGATIVE: Primary | ICD-10-CM

## 2024-01-11 DIAGNOSIS — Z17.1 CARCINOMA OF UPPER-INNER QUADRANT OF LEFT BREAST IN FEMALE, ESTROGEN RECEPTOR NEGATIVE: ICD-10-CM

## 2024-01-11 DIAGNOSIS — C50.919 TRIPLE NEGATIVE MALIGNANT NEOPLASM OF BREAST: Primary | ICD-10-CM

## 2024-01-11 DIAGNOSIS — C50.212 MALIGNANT NEOPLASM OF UPPER-INNER QUADRANT OF LEFT BREAST IN FEMALE, ESTROGEN RECEPTOR NEGATIVE: Primary | ICD-10-CM

## 2024-01-11 DIAGNOSIS — Z01.818 PRE-OP TESTING: ICD-10-CM

## 2024-01-11 DIAGNOSIS — C50.212 CARCINOMA OF UPPER-INNER QUADRANT OF LEFT BREAST IN FEMALE, ESTROGEN RECEPTOR NEGATIVE: Primary | ICD-10-CM

## 2024-01-11 DIAGNOSIS — Z17.1 CARCINOMA OF UPPER-INNER QUADRANT OF LEFT BREAST IN FEMALE, ESTROGEN RECEPTOR NEGATIVE: Primary | ICD-10-CM

## 2024-01-11 DIAGNOSIS — D64.9 ACUTE ANEMIA: ICD-10-CM

## 2024-01-11 DIAGNOSIS — C50.212 CARCINOMA OF UPPER-INNER QUADRANT OF LEFT BREAST IN FEMALE, ESTROGEN RECEPTOR NEGATIVE: ICD-10-CM

## 2024-01-11 PROCEDURE — 99999 PR PBB SHADOW E&M-EST. PATIENT-LVL III: CPT | Mod: PBBFAC,,, | Performed by: STUDENT IN AN ORGANIZED HEALTH CARE EDUCATION/TRAINING PROGRAM

## 2024-01-11 PROCEDURE — 99205 OFFICE O/P NEW HI 60 MIN: CPT | Mod: S$GLB,,, | Performed by: SURGERY

## 2024-01-11 PROCEDURE — 99999 PR PBB SHADOW E&M-EST. PATIENT-LVL III: CPT | Mod: PBBFAC,,, | Performed by: SURGERY

## 2024-01-11 PROCEDURE — 99999 PR PBB SHADOW E&M-EST. PATIENT-LVL III: CPT | Mod: PBBFAC,,, | Performed by: RADIOLOGY

## 2024-01-11 PROCEDURE — 99214 OFFICE O/P EST MOD 30 MIN: CPT | Mod: S$GLB,,, | Performed by: STUDENT IN AN ORGANIZED HEALTH CARE EDUCATION/TRAINING PROGRAM

## 2024-01-11 PROCEDURE — 99205 OFFICE O/P NEW HI 60 MIN: CPT | Mod: S$GLB,,, | Performed by: RADIOLOGY

## 2024-01-11 NOTE — PROGRESS NOTES
PATIENT IDENTIFICATION:  Patient Name: Alisha Cardenas  MRN: [id number]  : 1990    DIAGNOSIS:  Cancer Staging   No matching staging information was found for the patient.    HISTORY OF PRESENT ILLNESS:   The patient is a 33 year old woman with a recent diagnosis of triple negative breast cancer.    The patient presented with a palpable mass in the left breast.    Mammo Digital Diagnostic Bilat with Evgeny  Left  Mass: There is a high density, round mass with indistinct margins seen in the upper inner quadrant of the left breast. The mass correlates with the palpable mass reported by the patient.   Lymph Node: There is a lymph node with cortical thickening seen in the left axilla.      US Breast Left Limited  Left  Mass: There is a 34 mm x 33 mm x 32 mm round, non-parallel mass with indistinct margins seen in the upper inner quadrant of the left breast at 10 o'clock in the posterior depth, 6 cm from the nipple. The mass correlates with the palpable mass reported by the patient.   Lymph Node: There is an 18 mm x 15 mm x 14 mm lymph node with cortical thickening seen in the left axilla.     Biopsy of the left breast mass 10:00 position revealed a poorly differentiated invasive carcinoma.  On IHC,  the tumor was triple negative with a ki67 proliferative index of >90%.  The left axillary lymph node was biopsied and negative for metastatic carcinoma.    The patient underwent breast MRI yesterday.  Oncology History    No history exists.        REVIEW OF SYSTEMS:   Review of Systems   Constitutional:  Negative for fever, malaise/fatigue and weight loss.   HENT:  Negative for ear pain, hearing loss, sinus pain and sore throat.    Eyes:  Negative for blurred vision, double vision and pain.   Respiratory:  Negative for cough, hemoptysis, shortness of breath and wheezing.    Cardiovascular:  Negative for chest pain, palpitations and leg swelling.   Gastrointestinal:  Negative for abdominal pain, blood in stool,  constipation, diarrhea, heartburn, nausea and vomiting.   Genitourinary:  Negative for dysuria, frequency, hematuria and urgency.   Musculoskeletal:  Negative for back pain and joint pain.   Skin:  Negative for itching and rash.   Neurological:  Negative for tingling, focal weakness, seizures and headaches.   Psychiatric/Behavioral:  Negative for depression. The patient is nervous/anxious.        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 2014 9:22:57 AM    [facility]: , habitual w/o pregnancy-No Additional Notes    Missed  2014 9:22:49 AM    [facility]: , Missed, Less than 22 Weeks-No Additional Notes    Prior miscarriage with pregnancy in first trimester, antepartum     Prior miscarriage with pregnancy in first trimester, antepartum     Prior miscarriage with pregnancy in second trimester, antepartum        PAST SURGICAL HISTORY:  Past Surgical History:   Procedure Laterality Date    DILATION AND CURETTAGE OF UTERUS      MYOMECTOMY N/A 2022    Procedure: MYOMECTOMY;  Surgeon: Renzo Montoya III, MD;  Location: [facility];  Service: OB/GYN;  Laterality: N/A;    none      TONSILLECTOMY         ALLERGIES:   Review of patient's allergies indicates:   Allergen Reactions    Iodine Anaphylaxis    Shellfish containing products Anaphylaxis       MEDICATIONS:  Current Outpatient Medications   Medication Sig    cetirizine HCl (ZYRTEC ORAL) Take by mouth once daily at 6am.    multivit,calc,mins/iron/folic (WOMEN'S ONE DAILY ORAL) Take by mouth once daily.    diazePAM (VALIUM) 2 MG tablet Take 1 tablet (2 mg total) by mouth On call Procedure for Anxiety.     No current facility-administered  medications for this visit.       SOCIAL HISTORY:  Social History     Socioeconomic History    Marital status: Single   Tobacco Use    Smoking status: Never    Smokeless tobacco: Never   Substance and Sexual Activity    Alcohol use: Yes     Comment: weekends    Drug use: No    Sexual activity: Yes     Partners: Male     Birth control/protection: OCP     Social Determinants of Health     Financial Resource Strain: Medium Risk (11/27/2023)    Overall Financial Resource Strain (CARDIA)     Difficulty of Paying Living Expenses: Somewhat hard   Food Insecurity: No Food Insecurity (11/27/2023)    Hunger Vital Sign     Worried About Running Out of Food in the Last Year: Never true     Ran Out of Food in the Last Year: Never true   Transportation Needs: No Transportation Needs (11/27/2023)    PRAPARE - Transportation     Lack of Transportation (Medical): No     Lack of Transportation (Non-Medical): No   Physical Activity: Inactive (11/27/2023)    Exercise Vital Sign     Days of Exercise per Week: 0 days     Minutes of Exercise per Session: 0 min   Stress: No Stress Concern Present (11/27/2023)    [employer] [place] [employer] - Occupational Stress Questionnaire     Feeling of Stress : Only a little   Social Connections: Unknown (11/27/2023)    Social Connection and Isolation Panel [NHANES]     Frequency of Communication with Friends and Family: Once a week     Frequency of Social Gatherings with Friends and Family: Once a week     Active Member of Clubs or Organizations: No     Attends Club or Organization Meetings: Never     Marital Status: Never    Housing Stability: High Risk (11/27/2023)    Housing Stability Vital Sign     Unable to Pay for Housing in the Last Year: Yes     Number of Places Lived in the Last Year: 1     Unstable Housing in the Last Year: No       FAMILY HISTORY:  Family History   Problem Relation Age of Onset    No Known Problems Mother     No Known Problems Father     Vaginal cancer  Neg Hx     Endometrial cancer Neg Hx     Cervical cancer Neg Hx          PHYSICAL EXAMINATION:  Vitals:    24 1400   BP: (!) 141/70   Pulse: 84   Temp: 98.4 °F (36.9 °C)     Body mass index is 35.26 kg/m².    ECO  Physical Exam  Constitutional:       Appearance: Normal appearance.   HENT:      Head: Normocephalic and atraumatic.      Nose: Nose normal.      Mouth/Throat:      Mouth: Mucous membranes are moist.   Cardiovascular:      Rate and Rhythm: Normal rate.   Pulmonary:      Effort: Pulmonary effort is normal.   Chest:       Abdominal:      General: Abdomen is flat.      Palpations: Abdomen is soft.   Musculoskeletal:         General: Normal range of motion.      Cervical back: Normal range of motion.   Skin:     General: Skin is warm and dry.   Neurological:      General: No focal deficit present.      Mental Status: She is alert. Mental status is at baseline.             ASSESSMENT/PLAN:  Alisha was seen today for breast cancer.    Diagnoses and all orders for this visit:    Carcinoma of upper-inner quadrant of left breast in female, estrogen receptor negative      The patient would be recommended neoadjuvant chemotherapy per keynot 522 followed by surgery.  The patient is interested in pursuing bilateral mastectomy with reconstruction.  The patient would be recommended PMRT given the patient's young age, medial tumor location, and subtype of tumor (triple negative) and suspicion for lymph node involvement.  The patient would be recommended 5-6 weeks of daily EBRT.  Will follow along with team and meet again with the patient after surgery.      The risks and benefits of treatment have been discussed with the patient and she expressed full understanding. she understands the treatment plan and willing to proceed accordingly.    I spent approximately 60 minutes reviewing the available records and evaluating the patient, out of which over 50% of the time was spent face to face with the patient in  counseling and coordinating this patient's care.

## 2024-01-11 NOTE — PROGRESS NOTES
Breast Surgery  [facility]  Department of Surgery      REFERRING PROVIDER: Mariela Tan, NP  [address] [place] [address]    Chief Complaint: Left Breast Mass found to be invasive carcinoma      Subjective:      Patient ID: Alisha Cardenas is a 33 y.o. female who presents with Invasive carcinoma, poorly differentiated.    Patient notes a palpable mass of the left breast a few months ago. Follow-up diagnostic mammogram (23) and US left showed a 3.4 cm x 3.3 cm x 3.2 cm round, non parallel mass with indistinct margins in the upper inner quandrant of the left breast (10 o'clock). They also noted a lymph node in the left axilla that was 1.8cm x 1.5 cm x 1.4 cm and was suspcisious. Overall BI-RADS 4. A ultrasound guided biopsy was performed on 23 with pathology revealing invasive carcinoma that is poorly differentiated of the left breast.     Patient does routinely do self breast exams.  Patient has noted a change on breast exam.  Patient denies nipple discharge. Patient denies to previous breast biopsy. Patient denies a personal history of breast cancer.    Findings at that time were the following:   Tumor size: 3.4 x 3.3 x 3.2 cm (US 23)  Tumor ndgndrndanddndend:nd nd2nd Estrogen Receptor: Neg   Progesterone Receptor: Neg   Her-2 sheila: Neg   Ki -67: >90%  GATA3: scattered positivity  CK7: Diffusely positive  Lymph node status: one left axilla lymph node, negative for carcinoma   Lymphatic invasion: not lymphovascular invasion or carcinoma in situ identified     GYN History:  Age of menarche was 9.  LMP: 21  .     Past Medical History:   Diagnosis Date    Asthma     reports as a child    Astigmatism of left eye     Breast cancer     Left    Complete spontaneous  2014 9:22:39 AM    [facility] [place] [facility] - L[facility]A: , spontaneous, complete-No Additional Notes    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 2014 9:22:57 AM    [facility] -  LWHA: , habitual w/o pregnancy-No Additional Notes    Missed  2014 9:22:49 AM    [facility] - LWHA: , Missed, Less than 22 Weeks-No Additional Notes    Prior miscarriage with pregnancy in first trimester, antepartum     Prior miscarriage with pregnancy in first trimester, antepartum     Prior miscarriage with pregnancy in second trimester, antepartum      Past Surgical History:   Procedure Laterality Date    DILATION AND CURETTAGE OF UTERUS      INJECTION FOR SENTINEL NODE IDENTIFICATION Left 2024    Procedure: INJECTION, FOR SENTINEL NODE IDENTIFICATION;  Surgeon: Ivy Posada MD;  Location: [facility];  Service: General;  Laterality: Left;    INSERTION OF BREAST TISSUE EXPANDER Left 2024    Procedure: INSERTION, TISSUE EXPANDER, BREAST /  UNLATERAL LEFT TISSUE EXPANDER;  Surgeon: Mehran Street DO;  Location: Psychiatric;  Service: General;  Laterality: Left;  2 HORS    MASTECTOMY Left 2024    Procedure: MASTECTOMY;  Surgeon: Ivy Posada MD;  Location: Psychiatric;  Service: General;  Laterality: Left;  1.5 HRS    MASTECTOMY WITH SENTINEL NODE BIOPSY AND AXILLARY LYMPH NODE DISSECTION Left 2024    Procedure: MASTECTOMY, WITH SENTINEL NODE BIOPSY AND AXILLARY LYMPHADENECTOMY;  Surgeon: Ivy Posada MD;  Location: Psychiatric;  Service: General;  Laterality: Left;    MYOMECTOMY N/A 2022    Procedure: MYOMECTOMY;  Surgeon: Renzo Montoya III, MD;  Location: Psychiatric;  Service: OB/GYN;  Laterality: N/A;    none      RECONSTRUCTION OF BREAST WITH DEEP INFERIOR EPIGASTRIC ARTERY  (LETY) FREE FLAP Left 2024    Procedure: RECONSTRUCTION, BREAST, USING LETY FREE FLAP;  Surgeon: Mehran Street DO;  Location: Psychiatric;  Service: Plastics;  Laterality: Left;  Delayed left LETY flap    REMOVAL OF BREAST IMPLANT Left 2024    Procedure: REMOVAL, IMPLANT, BREAST;  Surgeon: Mehran Street DO;  Location: Psychiatric;  Service:  Plastics;  Laterality: Left;  left TE removal    SENTINEL LYMPH NODE BIOPSY Left 2/21/2024    Procedure: BIOPSY, LYMPH NODE, SENTINEL;  Surgeon: Ivy Posada MD;  Location: [facility];  Service: General;  Laterality: Left;    TONSILLECTOMY       Current Outpatient Medications on File Prior to Visit   Medication Sig Dispense Refill    cetirizine HCl (ZYRTEC ORAL) Take by mouth once daily at 6am.       No current facility-administered medications on file prior to visit.     Social History     Socioeconomic History    Marital status: Single   Tobacco Use    Smoking status: Never    Smokeless tobacco: Never   Substance and Sexual Activity    Alcohol use: Yes     Comment: weekends    Drug use: No    Sexual activity: Yes     Partners: Male     Birth control/protection: OCP     Social Determinants of Health     Financial Resource Strain: Medium Risk (4/2/2024)    Overall Financial Resource Strain (CARDIA)     Difficulty of Paying Living Expenses: Somewhat hard   Food Insecurity: Food Insecurity Present (4/2/2024)    Hunger Vital Sign     Worried About Running Out of Food in the Last Year: Sometimes true     Ran Out of Food in the Last Year: Never true   Transportation Needs: No Transportation Needs (4/2/2024)    PRAPARE - Transportation     Lack of Transportation (Medical): No     Lack of Transportation (Non-Medical): No   Physical Activity: Inactive (4/2/2024)    Exercise Vital Sign     Days of Exercise per Week: 0 days     Minutes of Exercise per Session: 0 min   Stress: Stress Concern Present (4/2/2024)    [employer] [place] [employer] - Occupational Stress Questionnaire     Feeling of Stress : To some extent   Housing Stability: Low Risk  (4/2/2024)    Housing Stability Vital Sign     Unable to Pay for Housing in the Last Year: No     Number of Places Lived in the Last Year: 1     Unstable Housing in the Last Year: No     Family History   Problem Relation Name Age of Onset    No Known Problems Mother      No  "Known Problems Father      Vaginal cancer Neg Hx      Endometrial cancer Neg Hx      Cervical cancer Neg Hx          Review of Systems   Constitutional:  Negative for appetite change, chills, fever and unexpected weight change.   HENT:  Negative for facial swelling, postnasal drip and sore throat.    Eyes:  Negative for redness and itching.   Respiratory:  Negative for chest tightness and shortness of breath.    Cardiovascular:  Negative for chest pain and palpitations.   Gastrointestinal:  Negative for blood in stool, diarrhea, nausea and vomiting.   Genitourinary:  Negative for difficulty urinating and dysuria.   Musculoskeletal:  Negative for arthralgias and joint swelling.   Skin:  Negative for rash and wound.   Neurological:  Negative for dizziness and syncope.   Hematological:  Negative for adenopathy.   Psychiatric/Behavioral:  Negative for agitation. The patient is not nervous/anxious.      Objective:   Ht 5' 1" (1.549 m)   Wt 84.4 kg (186 lb)   LMP 11/30/2023 (Exact Date)   BMI 35.14 kg/m²     Physical Exam   Vitals reviewed.  Constitutional: She is oriented to person, place, and time.   HENT:   Head: Normocephalic and atraumatic.   Nose: Nose normal.   Eyes: Pupils are equal, round, and reactive to light. Right eye exhibits no discharge. Left eye exhibits no discharge.   Pulmonary/Chest: Effort normal and breath sounds normal. No stridor. No respiratory distress. She exhibits no mass, no tenderness and no edema. Right breast exhibits no inverted nipple, no mass, no nipple discharge, no skin change and no tenderness. Left breast exhibits mass. Left breast exhibits no inverted nipple, no nipple discharge, no skin change and no tenderness. No breast swelling or bleeding. Breasts are symmetrical.       Abdominal: Normal appearance.   Genitourinary: No breast swelling or bleeding.   Neurological: She is alert and oriented to person, place, and time.   Skin: Skin is warm and dry.     Psychiatric: Her behavior " is normal. Mood, judgment and thought content normal.       Radiology review: Images personally reviewed by me in the clinic.   12/22/23 Mammo Digital Diagnostic Bilat with Evgeny  Left  Mass: There is a high density, round mass with indistinct margins seen in the upper inner quadrant of the left breast. The mass correlates with the palpable mass reported by the patient.   Lymph Node: There is a lymph node with cortical thickening seen in the left axilla.      Right  There is no evidence of suspicious masses, calcifications, or other abnormal findings in the right breast.     12/22/23 US Breast Left Limited  Left  Mass: There is a 34 mm x 33 mm x 32 mm round, non-parallel mass with indistinct margins seen in the upper inner quadrant of the left breast at 10 o'clock in the posterior depth, 6 cm from the nipple. The mass correlates with the palpable mass reported by the patient.   Lymph Node: There is an 18 mm x 15 mm x 14 mm lymph node with cortical thickening seen in the left axilla.  Assessment:       1. Malignant neoplasm of upper-inner quadrant of left breast in female, estrogen receptor negative    2. Pre-op testing    3. Carcinoma of upper-inner quadrant of left breast in female, estrogen receptor negative        Plan:     Options for management were discussed with the patient and her family. We reviewed the existing data noting the equivalency of breast conserving surgery with radiation therapy and mastectomy. We also reviewed the guidelines of the National Comprehensive Cancer Network for Stage 2 breast carcinoma. We discussed the need for lumpectomy margins to be negative for carcinoma, the necessity for postoperative radiation therapy after breast conservation in most cases, the possibility of a failed or false negative sentinel lymph node biopsy and the potential need for complete lymphadenectomy for a failed or positive sentinel lymph node biopsy were fully discussed. In the setting of mastectomy, delayed  or immediate reconstruction options are available and were discussed.     In the setting of lumpectomy, radiation therapy would be recommended majority of the time.  The duration and treatment side effects were discussed with the patient.  This will coordinated with the radiation oncologist pending final pathology.    We also discussed the role of systemic therapy in the treatment of early stage breast cancer.  We discussed that this is based on tumor biology and ashwini status and will be determined based on final pathology.  We discussed that if the cancer is hormone positive, endocrine therapy would be recommended in most cases and its use can reduce the risk of recurrence as well as improve survival. Side effects of treatment were briefly discussed. We also discussed the potential role for chemotherapy based on a number of factors such as tumor phenotype (ER+ vs. triple negative vs. [id number]+) and this would be determined in coordination with the medical oncologist.    MRI is still pending.     Genetic testing    Long discussion with patient regarding next steps. Given disease burden, triple negative receptor status and young age, NACT is recommended. Patient declines in favor of surgery first.     The patient, in consultation with her family, has elected to proceed with bilateral mastectomy and sentinel lymph node biopsy with reconstruction. Will refer to plastic surgeon to discuss. The operative risks of bleeding, infection, recurrence, scarring, and anesthetic complications and the possibility of requiring further surgery were all noted and informed consent obtained.    She is interested in fertility preservation.    Surgery scheduled. Follow-up in clinic roughly 14 days after surgery.     Patient was educated on breast cancer, receptors, wire localization lumpectomy, mastectomy, sentinel lymph node mapping and biopsy, axillary lymph node dissection, reconstruction, breast prosthesis with post-mastectomy bra and  radiation therapy. Patient was given patient information binder including [facility] breast cancer treatment brochure.  All her questions were answered.    Total time spent with the patient: 65 minutes.  45 minutes of face to face consultation and 20 minutes of chart review and coordination of care.

## 2024-01-12 ENCOUNTER — DOCUMENTATION ONLY (OUTPATIENT)
Dept: SURGERY | Facility: CLINIC | Age: 34
End: 2024-01-12
Payer: COMMERCIAL

## 2024-01-12 ENCOUNTER — LAB VISIT (OUTPATIENT)
Dept: LAB | Facility: HOSPITAL | Age: 34
End: 2024-01-12
Attending: SURGERY
Payer: COMMERCIAL

## 2024-01-12 ENCOUNTER — TELEPHONE (OUTPATIENT)
Dept: PLASTIC SURGERY | Facility: CLINIC | Age: 34
End: 2024-01-12
Payer: COMMERCIAL

## 2024-01-12 ENCOUNTER — PATIENT MESSAGE (OUTPATIENT)
Dept: SURGERY | Facility: CLINIC | Age: 34
End: 2024-01-12
Payer: COMMERCIAL

## 2024-01-12 DIAGNOSIS — C50.919 TRIPLE NEGATIVE MALIGNANT NEOPLASM OF BREAST: Primary | ICD-10-CM

## 2024-01-12 DIAGNOSIS — C50.919 TRIPLE NEGATIVE MALIGNANT NEOPLASM OF BREAST: ICD-10-CM

## 2024-01-12 DIAGNOSIS — F41.9 ANXIETY DUE TO INVASIVE PROCEDURE: Primary | ICD-10-CM

## 2024-01-12 DIAGNOSIS — Z01.818 PRE-OP TESTING: ICD-10-CM

## 2024-01-12 LAB
ALBUMIN SERPL BCP-MCNC: 3.7 G/DL (ref 3.5–5.2)
ALP SERPL-CCNC: 61 U/L (ref 55–135)
ALT SERPL W/O P-5'-P-CCNC: 15 U/L (ref 10–44)
ANION GAP SERPL CALC-SCNC: 10 MMOL/L (ref 8–16)
AST SERPL-CCNC: 15 U/L (ref 10–40)
BASOPHILS # BLD AUTO: 0.06 K/UL (ref 0–0.2)
BASOPHILS NFR BLD: 0.6 % (ref 0–1.9)
BILIRUB SERPL-MCNC: 0.3 MG/DL (ref 0.1–1)
BUN SERPL-MCNC: 16 MG/DL (ref 6–20)
CALCIUM SERPL-MCNC: 9.3 MG/DL (ref 8.7–10.5)
CHLORIDE SERPL-SCNC: 105 MMOL/L (ref 95–110)
CO2 SERPL-SCNC: 24 MMOL/L (ref 23–29)
CREAT SERPL-MCNC: 0.8 MG/DL (ref 0.5–1.4)
DIFFERENTIAL METHOD BLD: ABNORMAL
EOSINOPHIL # BLD AUTO: 0.5 K/UL (ref 0–0.5)
EOSINOPHIL NFR BLD: 4.8 % (ref 0–8)
ERYTHROCYTE [DISTWIDTH] IN BLOOD BY AUTOMATED COUNT: 14.9 % (ref 11.5–14.5)
EST. GFR  (NO RACE VARIABLE): >60 ML/MIN/1.73 M^2
GLUCOSE SERPL-MCNC: 96 MG/DL (ref 70–110)
HCT VFR BLD AUTO: 38.3 % (ref 37–48.5)
HGB BLD-MCNC: 11.7 G/DL (ref 12–16)
IMM GRANULOCYTES # BLD AUTO: 0.05 K/UL (ref 0–0.04)
IMM GRANULOCYTES NFR BLD AUTO: 0.5 % (ref 0–0.5)
LYMPHOCYTES # BLD AUTO: 3 K/UL (ref 1–4.8)
LYMPHOCYTES NFR BLD: 27.9 % (ref 18–48)
MCH RBC QN AUTO: 23.4 PG (ref 27–31)
MCHC RBC AUTO-ENTMCNC: 30.5 G/DL (ref 32–36)
MCV RBC AUTO: 77 FL (ref 82–98)
MONOCYTES # BLD AUTO: 0.6 K/UL (ref 0.3–1)
MONOCYTES NFR BLD: 5.3 % (ref 4–15)
NEUTROPHILS # BLD AUTO: 6.5 K/UL (ref 1.8–7.7)
NEUTROPHILS NFR BLD: 60.9 % (ref 38–73)
NRBC BLD-RTO: 0 /100 WBC
PLATELET # BLD AUTO: 540 K/UL (ref 150–450)
PMV BLD AUTO: 10.4 FL (ref 9.2–12.9)
POTASSIUM SERPL-SCNC: 3.9 MMOL/L (ref 3.5–5.1)
PROT SERPL-MCNC: 7.8 G/DL (ref 6–8.4)
RBC # BLD AUTO: 4.99 M/UL (ref 4–5.4)
SODIUM SERPL-SCNC: 139 MMOL/L (ref 136–145)
WBC # BLD AUTO: 10.66 K/UL (ref 3.9–12.7)

## 2024-01-12 PROCEDURE — 85025 COMPLETE CBC W/AUTO DIFF WBC: CPT | Performed by: SURGERY

## 2024-01-12 PROCEDURE — 80053 COMPREHEN METABOLIC PANEL: CPT | Performed by: SURGERY

## 2024-01-12 PROCEDURE — 36415 COLL VENOUS BLD VENIPUNCTURE: CPT | Performed by: SURGERY

## 2024-01-12 RX ORDER — DIAZEPAM 2 MG/1
2 TABLET ORAL
Qty: 2 TABLET | Refills: 0 | Status: SHIPPED | OUTPATIENT
Start: 2024-01-12 | End: 2024-02-19

## 2024-01-12 NOTE — PROGRESS NOTES
Nurse Navigator Note:     Met with patient during her consult with Dr. Posada.  Patient and I reviewed the information she discussed with Dr. Posada, including treatment options, diagnosis, and future plans for workup. Patient and I went through the new patient booklet, explained some of the information and why it is provided.     Also offered patient consults with our other specialty clinics: Integrative Oncology, Survivorship and/or Women's Gynecologic needs, our breast physical therapy department for pre-op and post-operative assessments, Oncologic Psychology for psychological support, and Oncologic Nutrition for nutritional counseling. Explained to patient that all of these support services are completely optional. Discussed that physical therapy may call patient to offer pre-op appt, and what that appt would entail.     Patient was given a copy of her appointments, Dr. Posada's card, and my card. Encouraged her to call me if she has any questions or concerns or would like to schedule any additional appointments. Verbalized understanding of all information.  Oncology Navigation   Intake  Date of Diagnosis: 12/22/23  Cancer Type: Breast  Internal / External Referral: Internal  Date of Referral: 11/29/23  Initial Nurse Navigator Contact: 01/03/24  Referral to Initial Contact Timeline (days): 35  Date Worked: 01/12/24  First Appointment Available: 01/11/24  Appointment Date: 01/11/24  First Available Date vs. Scheduled Date (days): 0     Treatment  Current Status: Staging work-up    Surgical Oncologist: Swetha  Consult Date: 01/11/24    Medical Oncologist: Savita  Consult Date: 01/11/24    Radiation Oncologist: Kelsi    Procedures: PET scan  Biopsy Schedule Date: 12/22/23  Diagnostic Mammo Schedule Date: 12/22/23  MRI Schedule Date: 01/10/24  PET Scan Schedule Date: 01/18/24  Ultrasound Schedule Date: 12/22/23    General Referrals: Integrative Medicine    ER: Negative  DC: Negative  Her2: Negative    Radiation  Oncologist: Kelsi    Support Systems: Family members     Acuity  Treatment Tolerability: Has not started treatment yet/treatment fully completed and side effects resolved  Hospitalization Within the Past Month: 0   Needed: 0  Support: 0  Verbalizes Financial Concerns: 0  Transportation: 0  History of noncompliance/frequent no shows and cancellations: 0  Verbalizes the need for more education: 0  Navigation Acuity: 0     Follow Up  No follow-ups on file.

## 2024-01-12 NOTE — TELEPHONE ENCOUNTER
Spoke to pt and scheduled appt for a consult to see Dr Street for a NP breast recon  on  2pm at [facility], 2nd floor. [address].    Provided patient with appointment time and date, address of the location and call back # to RN navigator. All questions and concerns addressed.

## 2024-01-12 NOTE — PROGRESS NOTES
Genetics Lay Navigator Note    Nurse Navigator : VIOLETTA Torres RN    MULTI-D PT. :   Surg Onc. : Rivere  Med Onc. : Savita Aggarwal Onc. : Kelsi      Met with patient at her MULTI-D consult (1/11/2024), to facilitate genetic testing. Set patient up with [employer] genetic counselor over the phone to complete counseling prior to testing. Patient verbalized understanding to all counseling information. [employer] brochure with number to call with questions or concerns provided to patient as well as my card. Encouraged patient to call me or [employer] at any time.     Lab appointment made and patient escorted with [employer] kit to lab for specimen draw and processing. Patient instructed that results will be provided as soon as they are available. No questions or concerns from patient about plan of care.     [employer] Genetic Pedigree & TRF scanned in media and attached to this documentation note.    [facility] Tracking # [id number]

## 2024-01-18 ENCOUNTER — OFFICE VISIT (OUTPATIENT)
Dept: SURGERY | Facility: CLINIC | Age: 34
End: 2024-01-18
Payer: COMMERCIAL

## 2024-01-18 ENCOUNTER — HOSPITAL ENCOUNTER (OUTPATIENT)
Dept: RADIOLOGY | Facility: HOSPITAL | Age: 34
Discharge: HOME | End: 2024-01-18
Attending: SURGERY
Payer: COMMERCIAL

## 2024-01-18 ENCOUNTER — TELEPHONE (OUTPATIENT)
Dept: SURGERY | Facility: CLINIC | Age: 34
End: 2024-01-18
Payer: COMMERCIAL

## 2024-01-18 VITALS
DIASTOLIC BLOOD PRESSURE: 64 MMHG | BODY MASS INDEX: 35.12 KG/M2 | HEART RATE: 79 BPM | WEIGHT: 186 LBS | HEIGHT: 61 IN | SYSTOLIC BLOOD PRESSURE: 101 MMHG | OXYGEN SATURATION: 99 %

## 2024-01-18 DIAGNOSIS — C50.212 MALIGNANT NEOPLASM OF UPPER-INNER QUADRANT OF LEFT BREAST IN FEMALE, ESTROGEN RECEPTOR NEGATIVE: Primary | ICD-10-CM

## 2024-01-18 DIAGNOSIS — Z17.1 MALIGNANT NEOPLASM OF UPPER-INNER QUADRANT OF LEFT BREAST IN FEMALE, ESTROGEN RECEPTOR NEGATIVE: Primary | ICD-10-CM

## 2024-01-18 DIAGNOSIS — C50.919 TRIPLE NEGATIVE MALIGNANT NEOPLASM OF BREAST: ICD-10-CM

## 2024-01-18 DIAGNOSIS — Z17.1 CARCINOMA OF UPPER-INNER QUADRANT OF LEFT BREAST IN FEMALE, ESTROGEN RECEPTOR NEGATIVE: Primary | ICD-10-CM

## 2024-01-18 DIAGNOSIS — C50.212 CARCINOMA OF UPPER-INNER QUADRANT OF LEFT BREAST IN FEMALE, ESTROGEN RECEPTOR NEGATIVE: Primary | ICD-10-CM

## 2024-01-18 PROCEDURE — 99999 PR PBB SHADOW E&M-EST. PATIENT-LVL III: CPT | Mod: PBBFAC,,, | Performed by: SURGERY

## 2024-01-18 PROCEDURE — 99204 OFFICE O/P NEW MOD 45 MIN: CPT | Mod: S$GLB,,, | Performed by: SURGERY

## 2024-01-18 NOTE — PROGRESS NOTES
Plastic Surgery History & Physical    SUBJECTIVE:   Chief complaint: Breast reconstruction    History of Present Illness:  33 y.o. female with a PMH of recently diagnosed left breast ca presents to discus breast reconstruction. She palpated a left breast mass which was evaluated and biopsied with poorly differentiated invasive carcinoma. She declined neoadjuvant chemotherapy after hearing about a difficult experience with it in her cousin who has breast cancer. She prefers to have surgery upfront. She is willing to undergo radiation.    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    Past Medical History:   Diagnosis Date    Asthma     reports as a child    Astigmatism of left eye     Complete spontaneous  2014 9:22:39 AM    [facility] [facility] - [facility]A: , spontaneous, complete-No Additional Notes    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 2014 9:22:57 AM    [facility] [place] Historical - LWHA: , habitual w/o pregnancy-No Additional Notes    Missed  2014 9:22:49 AM    [facility] - L[facility]A: , Missed, Less than 22 Weeks-No Additional Notes    Prior miscarriage with pregnancy in first trimester, antepartum     Prior miscarriage with pregnancy in first trimester, antepartum     Prior miscarriage with pregnancy in second trimester, antepartum        Past Surgical History:   Procedure Laterality Date    DILATION AND CURETTAGE OF UTERUS      MYOMECTOMY N/A 2022    Procedure: MYOMECTOMY;  Surgeon: Renzo Montoya III, MD;  Location: [facility];  Service: OB/GYN;  Laterality: N/A;    none      TONSILLECTOMY         Family History   Problem Relation Age of Onset    No Known Problems Mother     No Known Problems Father     Vaginal cancer Neg Hx     Endometrial cancer Neg Hx     Cervical cancer Neg Hx        Social History     Socioeconomic History    Marital status: Single   Tobacco Use    Smoking status: Never    Smokeless tobacco:  Never   Substance and Sexual Activity    Alcohol use: Yes     Comment: weekends    Drug use: No    Sexual activity: Yes     Partners: Male     Birth control/protection: OCP     Social Determinants of Health     Financial Resource Strain: Medium Risk (11/27/2023)    Overall Financial Resource Strain (CARDIA)     Difficulty of Paying Living Expenses: Somewhat hard   Food Insecurity: No Food Insecurity (11/27/2023)    Hunger Vital Sign     Worried About Running Out of Food in the Last Year: Never true     Ran Out of Food in the Last Year: Never true   Transportation Needs: No Transportation Needs (11/27/2023)    PRAPARE - Transportation     Lack of Transportation (Medical): No     Lack of Transportation (Non-Medical): No   Physical Activity: Inactive (11/27/2023)    Exercise Vital Sign     Days of Exercise per Week: 0 days     Minutes of Exercise per Session: 0 min   Stress: No Stress Concern Present (11/27/2023)    [employer] [place] [employer] - Occupational Stress Questionnaire     Feeling of Stress : Only a little   Social Connections: Unknown (11/27/2023)    Social Connection and Isolation Panel [NHANES]     Frequency of Communication with Friends and Family: Once a week     Frequency of Social Gatherings with Friends and Family: Once a week     Active Member of Clubs or Organizations: No     Attends Club or Organization Meetings: Never     Marital Status: Never    Housing Stability: High Risk (11/27/2023)    Housing Stability Vital Sign     Unable to Pay for Housing in the Last Year: Yes     Number of Places Lived in the Last Year: 1     Unstable Housing in the Last Year: No       Current Outpatient Medications   Medication Sig Dispense Refill    cetirizine HCl (ZYRTEC ORAL) Take by mouth once daily at 6am.      diazePAM (VALIUM) 2 MG tablet Take 1 tablet (2 mg total) by mouth On call Procedure for Anxiety. 2 tablet 0    diazePAM (VALIUM) 2 MG tablet Take 1 tablet (2 mg total) by mouth On call  "Procedure for Anxiety. 2 tablet 0    multivit,calc,mins/iron/folic (WOMEN'S ONE DAILY ORAL) Take by mouth once daily.       No current facility-administered medications for this visit.       Review of patient's allergies indicates:   Allergen Reactions    Iodine Anaphylaxis    Shellfish containing products Anaphylaxis         Review of Systems:  Review of Systems        OBJECTIVE:     /64 (BP Location: Right arm, Patient Position: Sitting, BP Method: Large (Automatic))   Pulse 79   Ht 5' 1" (1.549 m)   Wt 84.4 kg (186 lb)   LMP 12/19/2023 (Exact Date)   SpO2 99%   BMI 35.14 kg/m²       Physical Exam:  Gen: NAD, appears stated age  Neuro: normal without focal findings, mental status and speech normal  HEENT: NCAT, neck supple, PEERL  CV: RRR  Pulm: Breathing non-labored, chest wall movement equal bilaterally   Breast:  firm fixed mass at 11 oclock left breast   Right Left   SN-N 25 24   N-IMF 9 10   N-N 22   BW 14 14      Abdomen: soft, nontender, no guarding, adequate donor tissue  Gu: genitalia not examined  Extremity:normal strength, no cyanosis or edema  Skin: Skin color, texture, turgor normal. No rashes or lesions  Psych: oriented to time, place and person          ASSESSMENT/PLAN:     Carcinoma of upper-inner quadrant of left breast in female, estrogen receptor negative    Began with discussion of implant based reconstruction. This included both direct-to-implant, tissue expander based reconstruction, the possible use of ADM, drains and expected post-operative course.  Risks of implant based recon including: hematoma, seroma, infection, extrusion, capsular contracture, risks of rupture, need for replacement later in life and BII/RAINA-ALCL. Discussed basics of revision procedures including: tailoring of the skin envelope, repositioning of the implant, liposuction and fat grafting. Any questions answered.    Next we discussed autologous reconstruction with the most common donor sites (abdomen, thigh, " back). Explained that this procedure will reconstruction the breast with the patients own living tissue, without the need for an implant, but in exchange for a longer initial procedure and on average three day hospital stay afterwards.  Discussed flap monitoring, risks of take back/flap failure/ expected hospital course, use of drains, and recovery.  Risks including flap loss, fat necrosis, infection, wound breakdown, seroma, hematoma, scarring, need for reoperation, blood clots were all covered.  Typical revision procedures including lifting the breast, tailoring the skin, liposuction and fat grafting for contour were also discussed.    She will have Thania placed at the time of mastectomy as bridge for autologous reconstruction. Will send for photos, bring her back from preop visit.      Nissa Adhikari PA-C  Plastic and Reconstructive Surgery    I spent a total of 45 minutes on the day of the visit.This includes face to face time and non-face to face time preparing to see the patient (eg, review of tests), obtaining and/or reviewing separately obtained history, documenting clinical information in the electronic or other health record, independently interpreting results and communicating results to the patient/family/caregiver, or care coordinator.

## 2024-01-18 NOTE — ASSESSMENT & PLAN NOTE
Began with discussion of implant based reconstruction. This included both direct-to-implant, tissue expander based reconstruction, the possible use of ADM, drains and expected post-operative course.  Risks of implant based recon including: hematoma, seroma, infection, extrusion, capsular contracture, risks of rupture, need for replacement later in life and BII/RAINA-ALCL. Discussed basics of revision procedures including: tailoring of the skin envelope, repositioning of the implant, liposuction and fat grafting. Any questions answered.    Next we discussed autologous reconstruction with the most common donor sites (abdomen, thigh, back). Explained that this procedure will reconstruction the breast with the patients own living tissue, without the need for an implant, but in exchange for a longer initial procedure and on average three day hospital stay afterwards.  Discussed flap monitoring, risks of take back/flap failure/ expected hospital course, use of drains, and recovery.  Risks including flap loss, fat necrosis, infection, wound breakdown, seroma, hematoma, scarring, need for reoperation, blood clots were all covered.  Typical revision procedures including lifting the breast, tailoring the skin, liposuction and fat grafting for contour were also discussed.    She will have Thania placed at the time of mastectomy as bridge for autologous reconstruction. Will send for photos, bring her back from preop visit.

## 2024-01-19 ENCOUNTER — HOSPITAL ENCOUNTER (OUTPATIENT)
Dept: RADIOLOGY | Facility: HOSPITAL | Age: 34
Discharge: HOME | End: 2024-01-19
Attending: SURGERY
Payer: COMMERCIAL

## 2024-01-19 DIAGNOSIS — C50.212 MALIGNANT NEOPLASM OF UPPER-INNER QUADRANT OF LEFT BREAST IN FEMALE, ESTROGEN RECEPTOR NEGATIVE: ICD-10-CM

## 2024-01-19 DIAGNOSIS — Z17.1 MALIGNANT NEOPLASM OF UPPER-INNER QUADRANT OF LEFT BREAST IN FEMALE, ESTROGEN RECEPTOR NEGATIVE: ICD-10-CM

## 2024-01-19 LAB — POCT GLUCOSE: 83 MG/DL (ref 70–110)

## 2024-01-19 PROCEDURE — 78815 PET IMAGE W/CT SKULL-THIGH: CPT | Mod: 26,PI,, | Performed by: STUDENT IN AN ORGANIZED HEALTH CARE EDUCATION/TRAINING PROGRAM

## 2024-01-19 PROCEDURE — 78815 PET IMAGE W/CT SKULL-THIGH: CPT | Mod: TC

## 2024-01-19 PROCEDURE — A9552 F18 FDG: HCPCS

## 2024-01-23 ENCOUNTER — TELEPHONE (OUTPATIENT)
Dept: SURGERY | Facility: CLINIC | Age: 34
End: 2024-01-23
Payer: COMMERCIAL

## 2024-01-23 ENCOUNTER — OFFICE VISIT (OUTPATIENT)
Dept: SURGERY | Facility: CLINIC | Age: 34
End: 2024-01-23
Payer: COMMERCIAL

## 2024-01-23 DIAGNOSIS — Z17.1 MALIGNANT NEOPLASM OF UPPER-INNER QUADRANT OF LEFT BREAST IN FEMALE, ESTROGEN RECEPTOR NEGATIVE: Primary | ICD-10-CM

## 2024-01-23 DIAGNOSIS — C50.212 MALIGNANT NEOPLASM OF UPPER-INNER QUADRANT OF LEFT BREAST IN FEMALE, ESTROGEN RECEPTOR NEGATIVE: Primary | ICD-10-CM

## 2024-01-23 PROCEDURE — 99441 PR PHYSICIAN TELEPHONE EVALUATION 5-10 MIN: CPT | Mod: 95,,, | Performed by: NURSE PRACTITIONER

## 2024-01-23 NOTE — PROGRESS NOTES
Established Patient - Audio Only Telehealth Visit     The patient location is: [place]  The chief complaint leading to consultation is: imaging review   Visit type: Virtual visit with audio only (telephone)  Total time spent with patient: 10 minutes        The reason for the audio only service rather than synchronous audio and video virtual visit was related to technical difficulties or patient preference/necessity.     Each patient to whom I provide medical services by telemedicine is:  (1) informed of the relationship between the physician and patient and the respective role of any other health care provider with respect to management of the patient; and (2) notified that they may decline to receive medical services by telemedicine and may withdraw from such care at any time. Patient verbally consented to receive this service via voice-only telephone call.       HPI: Alisha Cardenas is a 33 y.o. female who presents with newly diagnosed left breast cancer. Patient reports self-palpating mass of the left breast a few months ago. Follow-up diagnostic mammogram (12/22/23) and US left showed a 3.4 cm mass with indistinct margins in the upper inner quandrant of the left breast (10 o'clock). They also noted a lymph node in the left axilla that was 1.8cm x 1.5 cm x 1.4 cm and was suspcisious. Overall BI-RADS 4. A ultrasound guided biopsy was performed on 12/22/23 with pathology revealing invasive carcinoma grade 3, ER/WA/HER2- of the left breast.      Assessment and plan:  Patient continues to decline adjuvant chemotherapy. She would like to proceed with left mastectomy and SLNB with reconstruction. She prefers LETY flap reconstruction but TE's are preferably upfront given high risk of receiving XRT.   Discussed MTI and PET findings     This service was not originating from a related E/M service provided within the previous 7 days nor will  to an E/M service or procedure within the next 24 hours or my soonest  available appointment.  Prevailing standard of care was able to be met in this audio-only visit.

## 2024-01-23 NOTE — TELEPHONE ENCOUNTER
Called patient to discuss genetic testing results. Patient stated that Evelia Vieira NP discuss the results with her at her visit.

## 2024-01-31 ENCOUNTER — TELEPHONE (OUTPATIENT)
Dept: SURGERY | Facility: CLINIC | Age: 34
End: 2024-01-31
Payer: COMMERCIAL

## 2024-01-31 LAB — INTEGRATED BRAC ANALYSIS: NORMAL

## 2024-01-31 NOTE — TELEPHONE ENCOUNTER
Spoke to patient and confirmed surgery date of 2/21/24 with [address] and [facility].  Patient would have preferred a sooner surgery date but explained this was the first date available.  Patient then agreeable to surgery date.

## 2024-02-02 ENCOUNTER — TELEPHONE (OUTPATIENT)
Dept: PLASTIC SURGERY | Facility: CLINIC | Age: 34
End: 2024-02-02
Payer: COMMERCIAL

## 2024-02-02 NOTE — TELEPHONE ENCOUNTER
Spoke to pt and confirmed  a surgery date 2/21/24 at the [facility] / [facility] floor -[facility] Same day  Surgery center  - Pt agreeable. Provided patient with details of pre /post op appts, basic prep for sx, arrival time the day before, preadmit w anesthesia dept, and address of the location.  call back # to RN navigator given should any questions or concerns arise  All questions and concerns addressed. Pt voiced understanding.

## 2024-02-06 RX ORDER — CEFAZOLIN SODIUM 2 G/50ML
2 SOLUTION INTRAVENOUS
Status: CANCELLED | OUTPATIENT
Start: 2024-02-06

## 2024-02-06 RX ORDER — SODIUM CHLORIDE 9 MG/ML
INJECTION, SOLUTION INTRAVENOUS CONTINUOUS
Status: CANCELLED | OUTPATIENT
Start: 2024-02-06

## 2024-02-08 ENCOUNTER — TELEPHONE (OUTPATIENT)
Dept: HEMATOLOGY/ONCOLOGY | Facility: CLINIC | Age: 34
End: 2024-02-08
Payer: COMMERCIAL

## 2024-02-08 NOTE — TELEPHONE ENCOUNTER
Spoke w/ pt in regards to onc behavioral health referral placed by Dr. Cailin Dolan. Pt decline scheduling for service.       [place], [place] ext [phone]

## 2024-02-14 ENCOUNTER — PATIENT MESSAGE (OUTPATIENT)
Dept: RESEARCH | Facility: HOSPITAL | Age: 34
End: 2024-02-14
Payer: COMMERCIAL

## 2024-02-14 ENCOUNTER — ANESTHESIA EVENT (OUTPATIENT)
Dept: SURGERY | Facility: OTHER | Age: 34
End: 2024-02-14
Payer: COMMERCIAL

## 2024-02-14 RX ORDER — PREGABALIN 75 MG/1
75 CAPSULE ORAL ONCE
Status: CANCELLED | OUTPATIENT
Start: 2024-02-14 | End: 2024-02-14

## 2024-02-14 RX ORDER — ACETAMINOPHEN 500 MG
1000 TABLET ORAL
Status: CANCELLED | OUTPATIENT
Start: 2024-02-14 | End: 2024-02-14

## 2024-02-14 RX ORDER — LIDOCAINE HYDROCHLORIDE 10 MG/ML
0.5 INJECTION, SOLUTION EPIDURAL; INFILTRATION; INTRACAUDAL; PERINEURAL ONCE
Status: CANCELLED | OUTPATIENT
Start: 2024-02-14 | End: 2024-02-14

## 2024-02-14 RX ORDER — SODIUM CHLORIDE, SODIUM LACTATE, POTASSIUM CHLORIDE, CALCIUM CHLORIDE 600; 310; 30; 20 MG/100ML; MG/100ML; MG/100ML; MG/100ML
INJECTION, SOLUTION INTRAVENOUS CONTINUOUS
Status: CANCELLED | OUTPATIENT
Start: 2024-02-14

## 2024-02-15 ENCOUNTER — HOSPITAL ENCOUNTER (OUTPATIENT)
Dept: PREADMISSION TESTING | Facility: OTHER | Age: 34
Discharge: HOME | End: 2024-02-15
Payer: COMMERCIAL

## 2024-02-16 ENCOUNTER — TELEPHONE (OUTPATIENT)
Dept: RESEARCH | Facility: HOSPITAL | Age: 34
End: 2024-02-16
Payer: COMMERCIAL

## 2024-02-19 ENCOUNTER — PATIENT MESSAGE (OUTPATIENT)
Dept: PREADMISSION TESTING | Facility: OTHER | Age: 34
End: 2024-02-19
Payer: COMMERCIAL

## 2024-02-19 NOTE — PRE ADMISSION SCREENING
Information to Prepare you for your Surgery    PRE-ADMIT TESTING   [address]  [facility]       Your surgery has been scheduled at [facility].  Please park in the open lot behind the [facility] at the corner of [address] and [address]. If this lot is full, please park in the [facility] [address] across the street.  For Further Information please call [phone].    On the day of surgery please report to Registration on the 1st floor of the [facility].    CONTACT YOUR PHYSICIAN'S OFFICE THE DAY PRIOR TO YOUR SURGERY TO OBTAIN YOUR ARRIVAL TIME. Dr. Posada [phone]    The evening before surgery do not eat anything after 9 p.m. ( this includes hard candy, chewing gum and mints).  You may only have GATORADE, POWERADE AND WATER  from 9 p.m. until you leave your home.   DO NOT DRINK ANY LIQUIDS ON THE WAY TO THE HOSPITAL.      Why does your anesthesiologist allow you to drink Gatorade/Powerade before surgery?  Gatorade/Powerade helps to increase your comfort before surgery and to decrease your nausea after surgery.   The carbohydrates in Gatorade/Powerade help reduce your body's stress response to surgery.  If you are a diabetic-drink only water prior to surgery.    Outpatient Surgery- May allow 2 adults (18 and older)/ Support Persons (1 being the designated ) for all surgical/procedural patients. A breastfeeding mother will be allowed her infant and 2 adult Support Persons. No one under the age of 18 will be allowed in the building.      SPECIAL MEDICATION INSTRUCTIONS:   Do not take any medications the morning of your surgery.    Surgery Patients:  If you take ASPIRIN - Your PHYSICIAN/SURGEON will need to inform you IF/OR when you need to stop taking aspirin prior to your surgery.     Starting the week prior to surgery, do not take any medications containing IBUPROFEN or NSAIDS (Advil, Aleve, BC, Goody's, Ketorolac, Meloxicam, Mobic, Motrin, Naproxen, Toradol,  etc).  If you are not sure if you should take a medicine please call your surgeon's office.  You may take Tylenol.    Do Not Wear any make-up (especially eye make-up) to surgery. Please remove any false eyelashes or eyelash extensions. If you arrive the day of surgery with makeup/eyelashes on you will be required to remove prior to surgery. (There is a risk of corneal abrasions if eye makeup/eyelash extensions are not removed)    Leave all valuables at home.   Do Not wear any jewelry or watches, including any metal in body piercings. Jewelry must be removed prior to coming to the hospital.  There is a possibility that rings that are unable to be removed may be cut off if they are on the surgical extremity.    Please remove all hair extensions, wigs, clips and any other metal accessories/ ornaments from your hair.  These items may pose a flammable/fire risk in Surgery and must be removed.    Do not shave your surgical area at least 5 days prior to your surgery. The surgical prep will be performed at the hospital according to Infection Control regulations.    Contact Lens must be removed before surgery. Either do not wear the contact lens or bring a case and solution for storage.  Please bring a container for eyeglasses or dentures as required.  Bring any paperwork your physician has provided, such as consent forms,  history and physicals, doctor's orders, etc.   Bring comfortable clothes that are loose fitting to wear upon discharge. Take into consideration the type of surgery being performed.  Maintain your diet as advised per your physician the day prior to surgery.    Adequate rest the night before surgery is advised.   Park in the Parking lot behind the hospital or in the [facility] [address] across the street from the parking lot. Parking is complimentary.  If you will be discharged the same day as your procedure, please arrange for a responsible adult to drive you home or to accompany you if traveling by  taxi.   YOU WILL NOT BE PERMITTED TO DRIVE OR TO LEAVE THE HOSPITAL ALONE AFTER SURGERY.   If you are being discharged the same day, it is strongly recommended that you arrange for someone to remain with you for the first 24 hrs following your surgery.    The Surgeon will speak to your family/visitor after your surgery regarding the outcome of your surgery and post op care.  The Surgeon may speak to you after your surgery, but there is a possibility you may not remember the details.  Please check with your family members regarding the conversation with the Surgeon.    We strongly recommend whoever is bringing you home be present for discharge instructions.  This will ensure a thorough understanding for your post op home care.              Bathing Instructions with Hibiclens or Antibacterial Soap  Shower the evening before and morning of your procedure with Chlorhexidine (Hibiclens)    Do not use Chlorhexidine on your face or genitals. Do not get in your eyes.  Wash your face with water and your regular face wash/soap  Use your regular shampoo  Apply Chlorhexidine (Hibiclens) directly on your skin or on a wet washcloth and wash gently. When showering: Move away from the shower stream when applying Chlorhexidine (Hibiclens) to avoid rinsing off too soon.  Rinse thoroughly with warm water  Do not dilute Chlorhexidine (Hibiclens)   Dry off as usual, do not use any deodorant, powder, body lotions, perfume, after shave or cologne.     Reviewed verbally and sent via Bango

## 2024-02-20 ENCOUNTER — OFFICE VISIT (OUTPATIENT)
Dept: PLASTIC SURGERY | Facility: CLINIC | Age: 34
End: 2024-02-20
Payer: COMMERCIAL

## 2024-02-20 ENCOUNTER — TELEPHONE (OUTPATIENT)
Dept: SURGERY | Facility: CLINIC | Age: 34
End: 2024-02-20
Payer: COMMERCIAL

## 2024-02-20 DIAGNOSIS — C50.212 CARCINOMA OF UPPER-INNER QUADRANT OF LEFT BREAST IN FEMALE, ESTROGEN RECEPTOR NEGATIVE: Primary | ICD-10-CM

## 2024-02-20 DIAGNOSIS — Z17.1 CARCINOMA OF UPPER-INNER QUADRANT OF LEFT BREAST IN FEMALE, ESTROGEN RECEPTOR NEGATIVE: Primary | ICD-10-CM

## 2024-02-20 PROCEDURE — 99214 OFFICE O/P EST MOD 30 MIN: CPT | Mod: 57,S$GLB,, | Performed by: SURGERY

## 2024-02-20 PROCEDURE — 99999 PR PBB SHADOW E&M-EST. PATIENT-LVL I: CPT | Mod: PBBFAC,,, | Performed by: SURGERY

## 2024-02-20 NOTE — PROGRESS NOTES
Plastic Surgery History & Physical    SUBJECTIVE:   Chief complaint: Preoperative visit for tissue expander placement    History of Present Illness:  33 y.o. female presenting to plastic surgery clinic for a preoperative visit. The patient was last seen on 24 at which time we discussed immediate left tissue expander placement.  Patient understands the details of tissue expander placement and has elected to undergo surgery on 24. The long term surgical plan for the patient includes bridge to autologous reconstruction.  She was expected to need adjuvant radiotherapy.  She declined chemotherapy for her large triple negative tumor  Since the last clinic visit there have been no significant changes in the patient's history.    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 2014 9:22:57 AM    [facility] Historical - [facility]: , habitual w/o pregnancy-No Additional Notes    Missed  2014 9:22:49 AM    [facility] Historical - [facility]: , Missed, Less than 22 Weeks-No Additional Notes    Prior miscarriage with pregnancy in first trimester, antepartum     Prior miscarriage with pregnancy in first trimester, antepartum     Prior miscarriage with pregnancy in second trimester, antepartum        Past Surgical History:   Procedure Laterality Date    DILATION AND CURETTAGE OF UTERUS      MYOMECTOMY N/A 2022    Procedure: MYOMECTOMY;  Surgeon: Renzo Montoya III, MD;  [facility]: [facility];  Service: OB/GYN;  Laterality: N/A;    none      TONSILLECTOMY         Family History   Problem Relation Age of Onset    No Known Problems Mother     No Known Problems Father     Vaginal cancer Neg Hx     Endometrial cancer Neg Hx     Cervical  cancer Neg Hx        Social History     Socioeconomic History    Marital status: Single   Tobacco Use    Smoking status: Never    Smokeless tobacco: Never   Substance and Sexual Activity    Alcohol use: Yes     Comment: weekends    Drug use: No    Sexual activity: Yes     Partners: Male     Birth control/protection: OCP     Social Determinants of Health     Financial Resource Strain: Medium Risk (11/27/2023)    Overall Financial Resource Strain (CARDIA)     Difficulty of Paying Living Expenses: Somewhat hard   Food Insecurity: No Food Insecurity (11/27/2023)    Hunger Vital Sign     Worried About Running Out of Food in the Last Year: Never true     Ran Out of Food in the Last Year: Never true   Transportation Needs: No Transportation Needs (11/27/2023)    PRAPARE - Transportation     Lack of Transportation (Medical): No     Lack of Transportation (Non-Medical): No   Physical Activity: Inactive (11/27/2023)    Exercise Vital Sign     Days of Exercise per Week: 0 days     Minutes of Exercise per Session: 0 min   Stress: No Stress Concern Present (11/27/2023)    [employer] [place] [employer] - Occupational Stress Questionnaire     Feeling of Stress : Only a little   Social Connections: Unknown (11/27/2023)    Social Connection and Isolation Panel [NHANES]     Frequency of Communication with Friends and Family: Once a week     Frequency of Social Gatherings with Friends and Family: Once a week     Active Member of Clubs or Organizations: No     Attends Club or Organization Meetings: Never     Marital Status: Never    Housing Stability: High Risk (11/27/2023)    Housing Stability Vital Sign     Unable to Pay for Housing in the Last Year: Yes     Number of Places Lived in the Last Year: 1     Unstable Housing in the Last Year: No       Current Outpatient Medications   Medication Sig Dispense Refill    aspirin/caffeine (BC PAIN RELIEF ORAL) Take by mouth as needed.      cetirizine HCl (ZYRTEC ORAL) Take  by mouth once daily at 6am.      ferrous sulfate (IRON ORAL) Take by mouth as needed.      multivit,calc,mins/iron/folic (WOMEN'S ONE DAILY ORAL) Take by mouth once daily.       No current facility-administered medications for this visit.       Review of patient's allergies indicates:   Allergen Reactions    Iodine Anaphylaxis    Shellfish containing products Anaphylaxis           OBJECTIVE:     There were no vitals taken for this visit.      Physical Exam:  Gen: NAD, appears stated age  Neuro: normal without focal findings, mental status and speech normal  HEENT: NCAT, neck supple, PEERL  CV: RRR  Pulm: Breathing non-labored, chest wall movement equal bilaterally   Breast: Exam deferred, reviewed medical photography  Abdomen: soft, nontender, no guarding  Extremity:normal strength, no cyanosis or edema  Skin: Skin color, texture, turgor normal. No rashes or lesions  Psych: oriented to time, place and person, mood and affect are within normal limits          ASSESSMENT/PLAN:     Alisha Cardenas was seen preoperatively today for Tissue Expander based breast reconstruction.  Planning for left nipple sparing mastectomy with pre-pec tissue expander placement without ADM    We discussed tissue expander based reconstruction, the possible use of ADM, drains and expected post-operative course.  Risks including: hematoma, seroma, infection, extrusion, capsular contracture, risks of rupture, malposition, wound breakdown, scarring, need for reoperation, blood clots were all covered. Discussed importance of patient wearing post operative garment as instructed.   Consents signed    Patient understands tissue expanders are temporary placeholders for long term reconstruction planning. In most cases the expander is left flat or partially filled with air in the operating room and the patient will undergo tissue expander fills with air/saline in the clinic as appropriate.     Reviewed multimodal pain control regimen used in the  post operative period. Prescriptions will be sent to the outpatient pharmacy the day of surgery.  The patient was given a packet including general instructions for post-operative care, instructions for the day of surgery, drain care and process to complete FMLA/Disability paperwork.  All questions were answered. The patient was given a business card with contact info and advised to contact the clinic with any questions or concerns before or after surgery.      [address]  Plastic and Reconstructive Surgery    I spent a total of 30 minutes on the day of the visit.  This includes face to face time and non-face to face time preparing to see the patient (eg, review of tests), obtaining and/or reviewing separately obtained history, documenting clinical information in the electronic or other health record, independently interpreting results and communicating results to the patient/family/caregiver, or care coordinator.

## 2024-02-20 NOTE — TELEPHONE ENCOUNTER
Confirmed arrival time for surgery on 02/22/24 at the [facility].   Arrival time is for 6:30 am surgery is scheduled for 8:30 am . Nothing to eat after 9 pm this evening 02/21/24. Clear liquids Gatorade until leaving home . Please leave all jewelry home . Patient voice understanding of this call

## 2024-02-21 ENCOUNTER — HOSPITAL ENCOUNTER (OUTPATIENT)
Facility: OTHER | Age: 34
Discharge: HOME | End: 2024-02-22
Attending: SURGERY | Admitting: SURGERY
Payer: COMMERCIAL

## 2024-02-21 ENCOUNTER — HOSPITAL ENCOUNTER (OUTPATIENT)
Dept: RADIOLOGY | Facility: OTHER | Age: 34
Discharge: HOME | End: 2024-02-21
Attending: SURGERY | Admitting: SURGERY
Payer: COMMERCIAL

## 2024-02-21 ENCOUNTER — ANESTHESIA (OUTPATIENT)
Dept: SURGERY | Facility: OTHER | Age: 34
End: 2024-02-21
Payer: COMMERCIAL

## 2024-02-21 DIAGNOSIS — C50.919 BREAST CANCER: ICD-10-CM

## 2024-02-21 DIAGNOSIS — Z17.1 MALIGNANT NEOPLASM OF UPPER-INNER QUADRANT OF LEFT BREAST IN FEMALE, ESTROGEN RECEPTOR NEGATIVE: ICD-10-CM

## 2024-02-21 DIAGNOSIS — C50.212 MALIGNANT NEOPLASM OF UPPER-INNER QUADRANT OF LEFT BREAST IN FEMALE, ESTROGEN RECEPTOR NEGATIVE: ICD-10-CM

## 2024-02-21 LAB
B-HCG UR QL: NEGATIVE
CTP QC/QA: YES

## 2024-02-21 PROCEDURE — 88365 INSITU HYBRIDIZATION (FISH): CPT | Mod: 26,,, | Performed by: PATHOLOGY

## 2024-02-21 PROCEDURE — 25000003 PHARM REV CODE 250: Performed by: SURGERY

## 2024-02-21 PROCEDURE — 88341 IMHCHEM/IMCYTCHM EA ADD ANTB: CPT | Mod: 26,,, | Performed by: PATHOLOGY

## 2024-02-21 PROCEDURE — 88342 IMHCHEM/IMCYTCHM 1ST ANTB: CPT | Mod: 26,,, | Performed by: PATHOLOGY

## 2024-02-21 PROCEDURE — 19357 TISS XPNDR PLMT BRST RCNSTJ: CPT | Mod: LT,,, | Performed by: SURGERY

## 2024-02-21 PROCEDURE — 63600175 PHARM REV CODE 636 W HCPCS: Performed by: SURGERY

## 2024-02-21 PROCEDURE — 25000003 PHARM REV CODE 250: Performed by: NURSE ANESTHETIST, CERTIFIED REGISTERED

## 2024-02-21 PROCEDURE — 27201423 OPTIME MED/SURG SUP & DEVICES STERILE SUPPLY: Performed by: SURGERY

## 2024-02-21 PROCEDURE — C1819 TISSUE LOCALIZATION-EXCISION: HCPCS | Performed by: SURGERY

## 2024-02-21 PROCEDURE — 25000242 PHARM REV CODE 250 ALT 637 W/ HCPCS: Performed by: ANESTHESIOLOGY

## 2024-02-21 PROCEDURE — 88331 PATH CONSLTJ SURG 1 BLK 1SPC: CPT | Mod: 26,,, | Performed by: PATHOLOGY

## 2024-02-21 PROCEDURE — P9045 ALBUMIN (HUMAN), 5%, 250 ML: HCPCS | Mod: JZ,JG | Performed by: NURSE ANESTHETIST, CERTIFIED REGISTERED

## 2024-02-21 PROCEDURE — 63600175 PHARM REV CODE 636 W HCPCS: Performed by: ANESTHESIOLOGY

## 2024-02-21 PROCEDURE — 88342 IMHCHEM/IMCYTCHM 1ST ANTB: CPT | Mod: 59 | Performed by: PATHOLOGY

## 2024-02-21 PROCEDURE — 81025 URINE PREGNANCY TEST: CPT | Performed by: ANESTHESIOLOGY

## 2024-02-21 PROCEDURE — 71000015 HC POSTOP RECOV 1ST HR: Performed by: SURGERY

## 2024-02-21 PROCEDURE — 36000706: Performed by: SURGERY

## 2024-02-21 PROCEDURE — 88307 TISSUE EXAM BY PATHOLOGIST: CPT | Performed by: PATHOLOGY

## 2024-02-21 PROCEDURE — 63600175 PHARM REV CODE 636 W HCPCS: Performed by: NURSE ANESTHETIST, CERTIFIED REGISTERED

## 2024-02-21 PROCEDURE — 25000003 PHARM REV CODE 250: Performed by: STUDENT IN AN ORGANIZED HEALTH CARE EDUCATION/TRAINING PROGRAM

## 2024-02-21 PROCEDURE — 88332 PATH CONSLTJ SURG EA ADD BLK: CPT | Mod: 26,,, | Performed by: PATHOLOGY

## 2024-02-21 PROCEDURE — 94640 AIRWAY INHALATION TREATMENT: CPT

## 2024-02-21 PROCEDURE — 71000033 HC RECOVERY, INTIAL HOUR: Performed by: SURGERY

## 2024-02-21 PROCEDURE — D9220A PRA ANESTHESIA: Mod: ANES,,, | Performed by: ANESTHESIOLOGY

## 2024-02-21 PROCEDURE — C1789 PROSTHESIS, BREAST, IMP: HCPCS | Performed by: SURGERY

## 2024-02-21 PROCEDURE — A9520 TC99 TILMANOCEPT DIAG 0.5MCI: HCPCS | Performed by: SURGERY

## 2024-02-21 PROCEDURE — 25000003 PHARM REV CODE 250: Performed by: ANESTHESIOLOGY

## 2024-02-21 PROCEDURE — 76098 X-RAY EXAM SURGICAL SPECIMEN: CPT | Mod: TC

## 2024-02-21 PROCEDURE — 71000039 HC RECOVERY, EACH ADD'L HOUR: Performed by: SURGERY

## 2024-02-21 PROCEDURE — 88364 INSITU HYBRIDIZATION (FISH): CPT | Performed by: PATHOLOGY

## 2024-02-21 PROCEDURE — 36000707: Performed by: SURGERY

## 2024-02-21 PROCEDURE — 19307 MAST MOD RAD: CPT | Mod: LT,,, | Performed by: SURGERY

## 2024-02-21 PROCEDURE — 38900 IO MAP OF SENT LYMPH NODE: CPT | Mod: LT,,, | Performed by: SURGERY

## 2024-02-21 PROCEDURE — 63600175 PHARM REV CODE 636 W HCPCS: Mod: JZ,JG | Performed by: SURGERY

## 2024-02-21 PROCEDURE — 15860 IV NJX TST VASC FLO FLAP/GRF: CPT | Mod: 51,,, | Performed by: SURGERY

## 2024-02-21 PROCEDURE — 71000016 HC POSTOP RECOV ADDL HR: Performed by: SURGERY

## 2024-02-21 PROCEDURE — C9290 INJ, BUPIVACAINE LIPOSOME: HCPCS | Performed by: SURGERY

## 2024-02-21 PROCEDURE — 37000008 HC ANESTHESIA 1ST 15 MINUTES: Performed by: SURGERY

## 2024-02-21 PROCEDURE — 88332 PATH CONSLTJ SURG EA ADD BLK: CPT | Performed by: PATHOLOGY

## 2024-02-21 PROCEDURE — 88365 INSITU HYBRIDIZATION (FISH): CPT | Performed by: PATHOLOGY

## 2024-02-21 PROCEDURE — 88307 TISSUE EXAM BY PATHOLOGIST: CPT | Mod: 26,,, | Performed by: PATHOLOGY

## 2024-02-21 PROCEDURE — 37000009 HC ANESTHESIA EA ADD 15 MINS: Performed by: SURGERY

## 2024-02-21 PROCEDURE — 63600175 PHARM REV CODE 636 W HCPCS: Performed by: STUDENT IN AN ORGANIZED HEALTH CARE EDUCATION/TRAINING PROGRAM

## 2024-02-21 PROCEDURE — 88364 INSITU HYBRIDIZATION (FISH): CPT | Mod: 26,,, | Performed by: PATHOLOGY

## 2024-02-21 PROCEDURE — 88313 SPECIAL STAINS GROUP 2: CPT | Mod: 26,,, | Performed by: PATHOLOGY

## 2024-02-21 PROCEDURE — 88313 SPECIAL STAINS GROUP 2: CPT | Mod: 59 | Performed by: PATHOLOGY

## 2024-02-21 PROCEDURE — C1729 CATH, DRAINAGE: HCPCS | Performed by: SURGERY

## 2024-02-21 PROCEDURE — D9220A PRA ANESTHESIA: Mod: CRNA,,, | Performed by: NURSE ANESTHETIST, CERTIFIED REGISTERED

## 2024-02-21 PROCEDURE — 88341 IMHCHEM/IMCYTCHM EA ADD ANTB: CPT | Mod: 59 | Performed by: PATHOLOGY

## 2024-02-21 PROCEDURE — 88331 PATH CONSLTJ SURG 1 BLK 1SPC: CPT | Mod: 59 | Performed by: PATHOLOGY

## 2024-02-21 DEVICE — TEXTURED, HIGH PROFILE, SUTURE TABS, INTEGRAL INJECTION DOME, 600CC
Type: IMPLANTABLE DEVICE | Site: BREAST | Status: NON-FUNCTIONAL
Brand: ARTOURA BREAST TISSUE EXPANDER
Removed: 2024-04-01

## 2024-02-21 RX ORDER — ALBUMIN HUMAN 50 G/1000ML
SOLUTION INTRAVENOUS
Status: DISCONTINUED | OUTPATIENT
Start: 2024-02-21 | End: 2024-02-21

## 2024-02-21 RX ORDER — ONDANSETRON HYDROCHLORIDE 2 MG/ML
INJECTION, SOLUTION INTRAVENOUS
Status: DISCONTINUED | OUTPATIENT
Start: 2024-02-21 | End: 2024-02-21

## 2024-02-21 RX ORDER — PROPOFOL 10 MG/ML
VIAL (ML) INTRAVENOUS
Status: DISCONTINUED | OUTPATIENT
Start: 2024-02-21 | End: 2024-02-21

## 2024-02-21 RX ORDER — METHOCARBAMOL 500 MG/1
500 TABLET, FILM COATED ORAL 4 TIMES DAILY
Status: DISCONTINUED | OUTPATIENT
Start: 2024-02-21 | End: 2024-02-22 | Stop reason: HOSPADM

## 2024-02-21 RX ORDER — KETAMINE HCL IN 0.9 % NACL 50 MG/5 ML
SYRINGE (ML) INTRAVENOUS
Status: DISCONTINUED | OUTPATIENT
Start: 2024-02-21 | End: 2024-02-21

## 2024-02-21 RX ORDER — DOCUSATE SODIUM 100 MG/1
100 CAPSULE, LIQUID FILLED ORAL EVERY 12 HOURS
Status: DISCONTINUED | OUTPATIENT
Start: 2024-02-21 | End: 2024-02-22 | Stop reason: HOSPADM

## 2024-02-21 RX ORDER — HYDROCODONE BITARTRATE AND ACETAMINOPHEN 5; 325 MG/1; MG/1
1 TABLET ORAL EVERY 4 HOURS PRN
Status: DISCONTINUED | OUTPATIENT
Start: 2024-02-21 | End: 2024-02-22 | Stop reason: HOSPADM

## 2024-02-21 RX ORDER — ROCURONIUM BROMIDE 10 MG/ML
INJECTION, SOLUTION INTRAVENOUS
Status: DISCONTINUED | OUTPATIENT
Start: 2024-02-21 | End: 2024-02-21

## 2024-02-21 RX ORDER — ONDANSETRON 8 MG/1
8 TABLET, ORALLY DISINTEGRATING ORAL EVERY 8 HOURS PRN
Status: DISCONTINUED | OUTPATIENT
Start: 2024-02-21 | End: 2024-02-22 | Stop reason: HOSPADM

## 2024-02-21 RX ORDER — SODIUM CHLORIDE, SODIUM LACTATE, POTASSIUM CHLORIDE, CALCIUM CHLORIDE 600; 310; 30; 20 MG/100ML; MG/100ML; MG/100ML; MG/100ML
INJECTION, SOLUTION INTRAVENOUS CONTINUOUS
Status: DISCONTINUED | OUTPATIENT
Start: 2024-02-21 | End: 2024-02-22 | Stop reason: HOSPADM

## 2024-02-21 RX ORDER — HYDROMORPHONE HYDROCHLORIDE 2 MG/ML
0.4 INJECTION, SOLUTION INTRAMUSCULAR; INTRAVENOUS; SUBCUTANEOUS EVERY 5 MIN PRN
Status: DISCONTINUED | OUTPATIENT
Start: 2024-02-21 | End: 2024-02-22 | Stop reason: HOSPADM

## 2024-02-21 RX ORDER — SODIUM CHLORIDE 0.9 % (FLUSH) 0.9 %
3 SYRINGE (ML) INJECTION
Status: DISCONTINUED | OUTPATIENT
Start: 2024-02-21 | End: 2024-02-22 | Stop reason: HOSPADM

## 2024-02-21 RX ORDER — DIPHENHYDRAMINE HYDROCHLORIDE 50 MG/ML
25 INJECTION INTRAMUSCULAR; INTRAVENOUS EVERY 6 HOURS PRN
Status: DISCONTINUED | OUTPATIENT
Start: 2024-02-21 | End: 2024-02-22 | Stop reason: HOSPADM

## 2024-02-21 RX ORDER — MIDAZOLAM HYDROCHLORIDE 1 MG/ML
INJECTION INTRAMUSCULAR; INTRAVENOUS
Status: DISCONTINUED | OUTPATIENT
Start: 2024-02-21 | End: 2024-02-21

## 2024-02-21 RX ORDER — PROCHLORPERAZINE EDISYLATE 5 MG/ML
5 INJECTION INTRAMUSCULAR; INTRAVENOUS EVERY 30 MIN PRN
Status: DISCONTINUED | OUTPATIENT
Start: 2024-02-21 | End: 2024-02-22 | Stop reason: HOSPADM

## 2024-02-21 RX ORDER — OXYCODONE HYDROCHLORIDE 5 MG/1
5 TABLET ORAL
Status: DISCONTINUED | OUTPATIENT
Start: 2024-02-21 | End: 2024-02-22 | Stop reason: HOSPADM

## 2024-02-21 RX ORDER — MIDAZOLAM HYDROCHLORIDE 1 MG/ML
1 INJECTION INTRAMUSCULAR; INTRAVENOUS ONCE
Status: COMPLETED | OUTPATIENT
Start: 2024-02-21 | End: 2024-02-21

## 2024-02-21 RX ORDER — ALBUTEROL SULFATE 2.5 MG/.5ML
2.5 SOLUTION RESPIRATORY (INHALATION) EVERY 6 HOURS PRN
Status: DISCONTINUED | OUTPATIENT
Start: 2024-02-21 | End: 2024-02-22 | Stop reason: HOSPADM

## 2024-02-21 RX ORDER — INDOCYANINE GREEN AND WATER 25 MG
KIT INJECTION
Status: DISCONTINUED | OUTPATIENT
Start: 2024-02-21 | End: 2024-02-21 | Stop reason: HOSPADM

## 2024-02-21 RX ORDER — PHENYLEPHRINE HYDROCHLORIDE 10 MG/ML
INJECTION INTRAVENOUS
Status: DISCONTINUED | OUTPATIENT
Start: 2024-02-21 | End: 2024-02-21

## 2024-02-21 RX ORDER — BUPIVACAINE HYDROCHLORIDE 2.5 MG/ML
INJECTION, SOLUTION EPIDURAL; INFILTRATION; INTRACAUDAL
Status: DISCONTINUED | OUTPATIENT
Start: 2024-02-21 | End: 2024-02-21 | Stop reason: HOSPADM

## 2024-02-21 RX ORDER — ACETAMINOPHEN 325 MG/1
650 TABLET ORAL EVERY 4 HOURS PRN
Status: DISCONTINUED | OUTPATIENT
Start: 2024-02-21 | End: 2024-02-22 | Stop reason: HOSPADM

## 2024-02-21 RX ORDER — TRANEXAMIC ACID 100 MG/ML
INJECTION, SOLUTION INTRAVENOUS
Status: DISCONTINUED | OUTPATIENT
Start: 2024-02-21 | End: 2024-02-21 | Stop reason: HOSPADM

## 2024-02-21 RX ORDER — ENOXAPARIN SODIUM 100 MG/ML
40 INJECTION SUBCUTANEOUS ONCE
Status: COMPLETED | OUTPATIENT
Start: 2024-02-22 | End: 2024-02-22

## 2024-02-21 RX ORDER — ALBUTEROL SULFATE 2.5 MG/.5ML
2.5 SOLUTION RESPIRATORY (INHALATION) ONCE
Status: COMPLETED | OUTPATIENT
Start: 2024-02-21 | End: 2024-02-21

## 2024-02-21 RX ORDER — OXYCODONE HYDROCHLORIDE 5 MG/1
10 TABLET ORAL EVERY 4 HOURS PRN
Status: DISCONTINUED | OUTPATIENT
Start: 2024-02-21 | End: 2024-02-22 | Stop reason: HOSPADM

## 2024-02-21 RX ORDER — FENTANYL CITRATE 50 UG/ML
INJECTION, SOLUTION INTRAMUSCULAR; INTRAVENOUS
Status: DISCONTINUED | OUTPATIENT
Start: 2024-02-21 | End: 2024-02-21

## 2024-02-21 RX ORDER — DEXAMETHASONE SODIUM PHOSPHATE 4 MG/ML
INJECTION, SOLUTION INTRA-ARTICULAR; INTRALESIONAL; INTRAMUSCULAR; INTRAVENOUS; SOFT TISSUE
Status: DISCONTINUED | OUTPATIENT
Start: 2024-02-21 | End: 2024-02-21

## 2024-02-21 RX ORDER — MEPERIDINE HYDROCHLORIDE 25 MG/ML
12.5 INJECTION INTRAMUSCULAR; INTRAVENOUS; SUBCUTANEOUS ONCE AS NEEDED
Status: COMPLETED | OUTPATIENT
Start: 2024-02-21 | End: 2024-02-21

## 2024-02-21 RX ORDER — PREGABALIN 75 MG/1
75 CAPSULE ORAL ONCE
Status: COMPLETED | OUTPATIENT
Start: 2024-02-21 | End: 2024-02-21

## 2024-02-21 RX ORDER — HYDROMORPHONE HYDROCHLORIDE 2 MG/ML
INJECTION, SOLUTION INTRAMUSCULAR; INTRAVENOUS; SUBCUTANEOUS
Status: DISCONTINUED | OUTPATIENT
Start: 2024-02-21 | End: 2024-02-21

## 2024-02-21 RX ORDER — SODIUM CHLORIDE 9 MG/ML
INJECTION, SOLUTION INTRAVENOUS CONTINUOUS
Status: DISCONTINUED | OUTPATIENT
Start: 2024-02-21 | End: 2024-02-22 | Stop reason: HOSPADM

## 2024-02-21 RX ORDER — LIDOCAINE HYDROCHLORIDE 20 MG/ML
INJECTION INTRAVENOUS
Status: DISCONTINUED | OUTPATIENT
Start: 2024-02-21 | End: 2024-02-21

## 2024-02-21 RX ORDER — INDOCYANINE GREEN AND WATER 25 MG
KIT INJECTION
Status: DISCONTINUED | OUTPATIENT
Start: 2024-02-21 | End: 2024-02-21

## 2024-02-21 RX ORDER — ACETAMINOPHEN 500 MG
1000 TABLET ORAL
Status: COMPLETED | OUTPATIENT
Start: 2024-02-21 | End: 2024-02-21

## 2024-02-21 RX ORDER — LIDOCAINE HYDROCHLORIDE 10 MG/ML
0.5 INJECTION, SOLUTION EPIDURAL; INFILTRATION; INTRACAUDAL; PERINEURAL ONCE
Status: DISCONTINUED | OUTPATIENT
Start: 2024-02-21 | End: 2024-02-22 | Stop reason: HOSPADM

## 2024-02-21 RX ADMIN — OXYCODONE HYDROCHLORIDE 10 MG: 5 TABLET ORAL at 09:02

## 2024-02-21 RX ADMIN — PHENYLEPHRINE HYDROCHLORIDE 100 MCG: 10 INJECTION INTRAVENOUS at 10:02

## 2024-02-21 RX ADMIN — HYDROMORPHONE HYDROCHLORIDE 0.4 MG: 2 INJECTION INTRAMUSCULAR; INTRAVENOUS; SUBCUTANEOUS at 06:02

## 2024-02-21 RX ADMIN — METHOCARBAMOL 500 MG: 500 TABLET ORAL at 04:02

## 2024-02-21 RX ADMIN — FENTANYL CITRATE 100 MCG: 50 INJECTION, SOLUTION INTRAMUSCULAR; INTRAVENOUS at 09:02

## 2024-02-21 RX ADMIN — HYDROMORPHONE HYDROCHLORIDE 0.4 MG: 2 INJECTION INTRAMUSCULAR; INTRAVENOUS; SUBCUTANEOUS at 01:02

## 2024-02-21 RX ADMIN — MIDAZOLAM HYDROCHLORIDE 1 MG: 1 INJECTION, SOLUTION INTRAMUSCULAR; INTRAVENOUS at 04:02

## 2024-02-21 RX ADMIN — HYDROMORPHONE HYDROCHLORIDE 0.4 MG: 2 INJECTION INTRAMUSCULAR; INTRAVENOUS; SUBCUTANEOUS at 05:02

## 2024-02-21 RX ADMIN — ONDANSETRON HYDROCHLORIDE 4 MG: 2 INJECTION INTRAMUSCULAR; INTRAVENOUS at 12:02

## 2024-02-21 RX ADMIN — ALBUMIN (HUMAN) 250 ML: 12.5 SOLUTION INTRAVENOUS at 09:02

## 2024-02-21 RX ADMIN — OXYCODONE HYDROCHLORIDE 5 MG: 5 TABLET ORAL at 01:02

## 2024-02-21 RX ADMIN — METHOCARBAMOL 500 MG: 500 TABLET ORAL at 09:02

## 2024-02-21 RX ADMIN — INDOCYANINE GREEN 10 MG: KIT INTRAVENOUS at 12:02

## 2024-02-21 RX ADMIN — PHENYLEPHRINE HYDROCHLORIDE 100 MCG: 10 INJECTION INTRAVENOUS at 12:02

## 2024-02-21 RX ADMIN — ALBUTEROL SULFATE 2.5 MG: 2.5 SOLUTION RESPIRATORY (INHALATION) at 08:02

## 2024-02-21 RX ADMIN — CARBOXYMETHYLCELLULOSE SODIUM 2 DROP: 2.5 SOLUTION/ DROPS OPHTHALMIC at 09:02

## 2024-02-21 RX ADMIN — MEPERIDINE HYDROCHLORIDE 12.5 MG: 25 INJECTION INTRAMUSCULAR; INTRAVENOUS; SUBCUTANEOUS at 05:02

## 2024-02-21 RX ADMIN — DOCUSATE SODIUM 100 MG: 100 CAPSULE, LIQUID FILLED ORAL at 09:02

## 2024-02-21 RX ADMIN — DEXAMETHASONE SODIUM PHOSPHATE 8 MG: 4 INJECTION, SOLUTION INTRAMUSCULAR; INTRAVENOUS at 09:02

## 2024-02-21 RX ADMIN — HYDROMORPHONE HYDROCHLORIDE 0.4 MG: 2 INJECTION INTRAMUSCULAR; INTRAVENOUS; SUBCUTANEOUS at 03:02

## 2024-02-21 RX ADMIN — HYDROMORPHONE HYDROCHLORIDE 0.5 MG: 2 INJECTION INTRAMUSCULAR; INTRAVENOUS; SUBCUTANEOUS at 11:02

## 2024-02-21 RX ADMIN — DEXTROSE MONOHYDRATE 1 G: 2.5 INJECTION INTRAVENOUS at 06:02

## 2024-02-21 RX ADMIN — ACETAMINOPHEN 1000 MG: 500 TABLET ORAL at 07:02

## 2024-02-21 RX ADMIN — PREGABALIN 75 MG: 75 CAPSULE ORAL at 07:02

## 2024-02-21 RX ADMIN — ROCURONIUM BROMIDE 20 MG: 10 SOLUTION INTRAVENOUS at 11:02

## 2024-02-21 RX ADMIN — ALBUMIN (HUMAN) 250 ML: 12.5 SOLUTION INTRAVENOUS at 10:02

## 2024-02-21 RX ADMIN — TILMANOCEPT 0.51 MILLICURIE: KIT at 08:02

## 2024-02-21 RX ADMIN — OXYCODONE HYDROCHLORIDE 5 MG: 5 TABLET ORAL at 05:02

## 2024-02-21 RX ADMIN — PROPOFOL 150 MG: 10 INJECTION, EMULSION INTRAVENOUS at 09:02

## 2024-02-21 RX ADMIN — PHENYLEPHRINE HYDROCHLORIDE 100 MCG: 10 INJECTION INTRAVENOUS at 09:02

## 2024-02-21 RX ADMIN — MIDAZOLAM HYDROCHLORIDE 2 MG: 1 INJECTION, SOLUTION INTRAMUSCULAR; INTRAVENOUS at 08:02

## 2024-02-21 RX ADMIN — ROCURONIUM BROMIDE 30 MG: 10 SOLUTION INTRAVENOUS at 09:02

## 2024-02-21 RX ADMIN — PHENYLEPHRINE HYDROCHLORIDE 100 MCG: 10 INJECTION INTRAVENOUS at 11:02

## 2024-02-21 RX ADMIN — SUGAMMADEX 200 MG: 100 INJECTION, SOLUTION INTRAVENOUS at 12:02

## 2024-02-21 RX ADMIN — SODIUM CHLORIDE, SODIUM LACTATE, POTASSIUM CHLORIDE, AND CALCIUM CHLORIDE: 600; 310; 30; 20 INJECTION, SOLUTION INTRAVENOUS at 08:02

## 2024-02-21 RX ADMIN — Medication 50 MG: at 09:02

## 2024-02-21 RX ADMIN — LIDOCAINE HYDROCHLORIDE 100 MG: 20 INJECTION, SOLUTION INTRAVENOUS at 09:02

## 2024-02-21 RX ADMIN — CEFAZOLIN 2 G: 2 INJECTION, POWDER, FOR SOLUTION INTRAMUSCULAR; INTRAVENOUS at 09:02

## 2024-02-21 NOTE — ANESTHESIA PROCEDURE NOTES
Intubation    Date/Time: 2/21/2024 9:05 AM    Performed by: Felicitas Ruiz CRNA  Authorized by: James Judd MD    Intubation:     Induction:  Intravenous    Intubated:  Postinduction    Mask Ventilation:  Easy mask    Attempts:  1    Attempted By:  CRNA    Method of Intubation:  Video laryngoscopy    Blade:  Ellis 3    Laryngeal View Grade: Grade I - full view of cords      Difficult Airway Encountered?: No      Complications:  None    Airway Device:  Oral endotracheal tube    Airway Device Size:  7.0    Tube secured:  21    Secured at:  The lips    Placement Verified By:  Capnometry    Complicating Factors:  None    Findings Post-Intubation:  BS equal bilateral and atraumatic/condition of teeth unchanged

## 2024-02-21 NOTE — INTERVAL H&P NOTE
The patient has been examined and the H&P has been reviewed:    I concur with the findings and no changes have occurred since H&P was written.    Anesthesia/Surgery risks, benefits and alternative options discussed and understood by patient/family.      L mastectomy, SLN    There are no hospital problems to display for this patient.

## 2024-02-21 NOTE — BRIEF OP NOTE
[facility] ([place])  Brief Operative Note    SUMMARY     Surgery Date: 2/21/2024     Surgeon(s) and Role:  Panel 1:     * Ivy Posada MD - Primary  Panel 2:     * Mehran Street DO - Primary    Assisting Surgeon: None    Pre-op Diagnosis:  Malignant neoplasm of upper-inner quadrant of left female breast, unspecified estrogen receptor status [C50.212]    Post-op Diagnosis:  Post-Op Diagnosis Codes:     * Malignant neoplasm of upper-inner quadrant of left female breast, unspecified estrogen receptor status [C50.212]    Procedure(s) (LRB):  MASTECTOMY (Left)  BIOPSY, LYMPH NODE, SENTINEL (Left)  INJECTION, FOR SENTINEL NODE IDENTIFICATION (Left)  MASTECTOMY, WITH SENTINEL NODE BIOPSY AND AXILLARY LYMPHADENECTOMY (Left)  INSERTION, TISSUE EXPANDER, BREAST /  UNLATERAL LEFT TISSUE EXPANDER (Left)    Anesthesia: General    Implants:  Implant Name Type Inv. Item Serial No.  Lot No. LRB No. Used Action   EXPANDER ARTOURA 600 [id number] - [id number]  EXPANDER ARTOURA 600 [id number]  [employer] [id number] Left 1 Implanted       Operative Findings: Left NSM and axillary node dissection with immediate pre pec tissue expander breast reconstruction    Estimated Blood Loss: minimal    Estimated Blood Loss has been documented.         Specimens:   Specimen (24h ago, onward)       Start     Ordered    02/21/24 1042  Specimen to Pathology, Surgery Breast  Once        Comments: Pre-op Diagnosis: Malignant neoplasm of upper-inner quadrant of left female breast, unspecified estrogen receptor status [C50.212]Procedure(s):MASTECTOMYBIOPSY, LYMPH NODE, SENTINELINJECTION, FOR SENTINEL NODE IDENTIFICATIONMASTECTOMY, WITH SENTINEL NODE BIOPSY AND AXILLARY LYMPHADENECTOMYINSERTION, TISSUE EXPANDER, BREAST /  UNLATERAL LEFT TISSUE EXPANDER Number of specimens: 3Name of specimens: 1. Left breast ( long stitch: LATERAL, short stitch: SUPERIOR, looped: subareolar)2. Left Axillary [place] Lymph node hot and clipped at  5483. Left Axillary [place] Lymph node hot 1174.     References:    Click here for ordering Quick Tip   Question Answer Comment   Procedure Type: Breast    Specimen Class: Known or suspected malignancy    Which provider would you like to cc? CHANO AYALA    Release to patient Immediate        02/21/24 1102    Pending  Specimen to Pathology, Surgery Breast  Once        Comments: Pre-op Diagnosis: Malignant neoplasm of upper-inner quadrant of left female breast, unspecified estrogen receptor status [C50.212]Procedure(s):MASTECTOMYBIOPSY, LYMPH NODE, SENTINELINJECTION, FOR SENTINEL NODE IDENTIFICATIONMASTECTOMY, WITH SENTINEL NODE BIOPSY AND AXILLARY LYMPHADENECTOMYINSERTION, TISSUE EXPANDER, BREAST /  UNLATERAL LEFT TISSUE EXPANDER Number of specimens: 4Name of specimens: 1. Left breast ( long stitch: LATERAL, short stitch: SUPERIOR, looped: subareolar)2. Left Axillary [place] Lymph node hot and clipped at 5483. Left Axillary [place] Lymph node hot 1174. Left Axillary Contents     References:    Click here for ordering Quick Tip   Question Answer Comment   Procedure Type: Breast    Specimen Class: Known or suspected malignancy    Which provider would you like to cc? CHANO AYALA    Release to patient Immediate        Pending                    [id number]

## 2024-02-21 NOTE — TRANSFER OF CARE
"Anesthesia Transfer of Care Note    Patient: Alisha Cardenas    Procedure(s) Performed: Procedure(s) (LRB):  MASTECTOMY (Left)  BIOPSY, LYMPH NODE, SENTINEL (Left)  INJECTION, FOR SENTINEL NODE IDENTIFICATION (Left)  MASTECTOMY, WITH SENTINEL NODE BIOPSY AND AXILLARY LYMPHADENECTOMY (Left)  INSERTION, TISSUE EXPANDER, BREAST /  UNLATERAL LEFT TISSUE EXPANDER (Left)    Patient location: PACU    Anesthesia Type: general    Transport from OR: Transported from OR on 2-3 L/min O2 by NC with adequate spontaneous ventilation    Post pain: adequate analgesia    Post assessment: no apparent anesthetic complications    Post vital signs: stable    Level of consciousness: awake and alert    Nausea/Vomiting: no nausea/vomiting    Complications: none    Transfer of care protocol was followed      Last vitals: Visit Vitals  /63 (BP Location: Left arm, Patient Position: Sitting)   Pulse 88   Temp 36.6 °C (97.9 °F) (Oral)   Resp 18   Ht 5' 1" (1.549 m)   Wt 84.4 kg (186 lb 1.1 oz)   SpO2 96%   Breastfeeding No   BMI 35.16 kg/m²     "

## 2024-02-21 NOTE — H&P
Breast Surgery  [facility]  Department of Surgery        REFERRING PROVIDER: Mariela Tan, NP  [address] [place] [address]     Chief Complaint: Left Breast Mass found to be invasive carcinoma        Subjective:      Patient ID: Alisha Cardenas is a 33 y.o. female who presents with Invasive carcinoma, poorly differentiated.     Patient notes a palpable mass of the left breast a few months ago. Follow-up diagnostic mammogram (23) and US left showed a 3.4 cm x 3.3 cm x 3.2 cm round, non parallel mass with indistinct margins in the upper inner quandrant of the left breast (10 o'clock). They also noted a lymph node in the left axilla that was 1.8cm x 1.5 cm x 1.4 cm and was suspcisious. Overall BI-RADS 4. A ultrasound guided biopsy was performed on 23 with pathology revealing invasive carcinoma that is poorly differentiated of the left breast.      Patient does routinely do self breast exams.  Patient has noted a change on breast exam.  Patient denies nipple discharge. Patient denies to previous breast biopsy. Patient denies a personal history of breast cancer.     Findings at that time were the following:   Tumor size: 3.4 x 3.3 x 3.2 cm (US 23)  Tumor thgthrthathdtheth:th th4th Estrogen Receptor: Neg   Progesterone Receptor: Neg   Her-2 sheila: Neg   Ki -67: >90%  GATA3: scattered positivity  CK7: Diffusely positive  Lymph node status: one left axilla lymph node, negative for carcinoma   Lymphatic invasion: not lymphovascular invasion or carcinoma in situ identified      GYN History:  Age of menarche was 9.  LMP: 21  .           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 2014 9:22:57 AM     [facility] Historical - [facility]:  , habitual w/o pregnancy-No Additional Notes    Missed  2014 9:22:49 AM     [facility] Historical - [facility]: , Missed, Less than 22 Weeks-No Additional Notes    Prior miscarriage with pregnancy in first trimester, antepartum      Prior miscarriage with pregnancy in first trimester, antepartum      Prior miscarriage with pregnancy in second trimester, antepartum              Past Surgical History:   Procedure Laterality Date    DILATION AND CURETTAGE OF UTERUS        MYOMECTOMY N/A 2022     Procedure: MYOMECTOMY;  Surgeon: Renzo Montoya III, MD;  Location: [facility];  Service: OB/GYN;  Laterality: N/A;    none        TONSILLECTOMY                 Current Outpatient Medications on File Prior to Visit   Medication Sig Dispense Refill    cetirizine HCl (ZYRTEC ORAL) Take by mouth once daily at 6am.        diazePAM (VALIUM) 2 MG tablet Take 1 tablet (2 mg total) by mouth On call Procedure for Anxiety. 2 tablet 0    multivit,calc,mins/iron/folic (WOMEN'S ONE DAILY ORAL) Take by mouth once daily.                    Current Facility-Administered Medications on File Prior to Visit   Medication Dose Route Frequency Provider Last Rate Last Admin    [COMPLETED] gadobenate dimeglumine (MULTIHANCE) injection 17 mL  17 mL Intravenous ONCE PRN Ivy Posada MD   17 mL at 01/10/24 1606      Social History               Socioeconomic History    Marital status: Single   Tobacco Use    Smoking status: Never    Smokeless tobacco: Never   Substance and Sexual Activity    Alcohol use: Yes       Comment: weekends    Drug use: No    Sexual activity: Yes       Partners: Male       Birth control/protection: OCP      Social Determinants of Health           Financial Resource Strain: Medium Risk (2023)     Overall Financial Resource Strain (CARDIA)      Difficulty of Paying Living Expenses: Somewhat hard   Food Insecurity: No Food Insecurity (2023)     Hunger Vital Sign      Worried About Running  Out of Food in the Last Year: Never true      Ran Out of Food in the Last Year: Never true   Transportation Needs: No Transportation Needs (11/27/2023)     PRAPARE - Transportation      Lack of Transportation (Medical): No      Lack of Transportation (Non-Medical): No   Physical Activity: Inactive (11/27/2023)     Exercise Vital Sign      Days of Exercise per Week: 0 days      Minutes of Exercise per Session: 0 min   Stress: No Stress Concern Present (11/27/2023)     [employer] - Occupational Stress Questionnaire      Feeling of Stress : Only a little   Social Connections: Unknown (11/27/2023)     Social Connection and Isolation Panel [NHANES]      Frequency of Communication with Friends and Family: Once a week      Frequency of Social Gatherings with Friends and Family: Once a week      Active Member of Clubs or Organizations: No      Attends Club or Organization Meetings: Never      Marital Status: Never    Housing Stability: High Risk (11/27/2023)     Housing Stability Vital Sign      Unable to Pay for Housing in the Last Year: Yes      Number of Places Lived in the Last Year: 1      Unstable Housing in the Last Year: No               Family History   Problem Relation Age of Onset    No Known Problems Mother      No Known Problems Father      Vaginal cancer Neg Hx      Endometrial cancer Neg Hx      Cervical cancer Neg Hx           Review of Systems   Constitutional:  Negative for appetite change, chills, fever and unexpected weight change.   HENT:  Negative for facial swelling, postnasal drip and sore throat.    Eyes:  Negative for redness and itching.   Respiratory:  Negative for chest tightness and shortness of breath.    Cardiovascular:  Negative for chest pain and palpitations.   Gastrointestinal:  Negative for blood in stool, diarrhea, nausea and vomiting.   Genitourinary:  Negative for difficulty urinating and dysuria.   Musculoskeletal:  Negative for arthralgias and  joint swelling.   Skin:  Negative for rash and wound.   Neurological:  Negative for dizziness and syncope.   Hematological:  Negative for adenopathy.   Psychiatric/Behavioral:  Negative for agitation. The patient is not nervous/anxious.       Objective:   LMP 11/30/2023 (Exact Date)      Physical Exam   Vitals reviewed.  Constitutional: She is oriented to person, place, and time.   HENT:   Head: Normocephalic and atraumatic.   Nose: Nose normal.   Eyes: Pupils are equal, round, and reactive to light. Right eye exhibits no discharge. Left eye exhibits no discharge.   Pulmonary/Chest: Effort normal and breath sounds normal. No stridor. No respiratory distress. She exhibits no mass, no tenderness and no edema. Right breast exhibits no inverted nipple, no mass, no nipple discharge, no skin change and no tenderness. Left breast exhibits mass. Left breast exhibits no inverted nipple, no nipple discharge, no skin change and no tenderness. No breast swelling or bleeding. Breasts are symmetrical.        Abdominal: Normal appearance.   Genitourinary: No breast swelling or bleeding.   Neurological: She is alert and oriented to person, place, and time.   Skin: Skin is warm and dry.      Psychiatric: Her behavior is normal. Mood, judgment and thought content normal.         Radiology review: Images personally reviewed by me in the clinic.   12/22/23 Mammo Digital Diagnostic Bilat with Evgeny  Left  Mass: There is a high density, round mass with indistinct margins seen in the upper inner quadrant of the left breast. The mass correlates with the palpable mass reported by the patient.   Lymph Node: There is a lymph node with cortical thickening seen in the left axilla.      Right  There is no evidence of suspicious masses, calcifications, or other abnormal findings in the right breast.     12/22/23 US Breast Left Limited  Left  Mass: There is a 34 mm x 33 mm x 32 mm round, non-parallel mass with indistinct margins seen in the upper  inner quadrant of the left breast at 10 o'clock in the posterior depth, 6 cm from the nipple. The mass correlates with the palpable mass reported by the patient.   Lymph Node: There is an 18 mm x 15 mm x 14 mm lymph node with cortical thickening seen in the left axilla.  Assessment:      Assessment   1. Malignant neoplasm of upper-inner quadrant of left breast in female, estrogen receptor negative    2. Pre-op testing          Plan:      Options for management were discussed with the patient and her family. We reviewed the existing data noting the equivalency of breast conserving surgery with radiation therapy and mastectomy. We also reviewed the guidelines of the National Comprehensive Cancer Network for Stage 2? breast carcinoma. We discussed the need for lumpectomy margins to be negative for carcinoma, the necessity for postoperative radiation therapy after breast conservation in most cases, the possibility of a failed or false negative sentinel lymph node biopsy and the potential need for complete lymphadenectomy for a failed or positive sentinel lymph node biopsy were fully discussed. In the setting of mastectomy, delayed or immediate reconstruction options are available and were discussed.      In the setting of lumpectomy, radiation therapy would be recommended majority of the time.  The duration and treatment side effects were discussed with the patient.  This will coordinated with the radiation oncologist pending final pathology.     We also discussed the role of systemic therapy in the treatment of early stage breast cancer.  We discussed that this is based on tumor biology and ashwini status and will be determined based on final pathology.  We discussed that if the cancer is hormone positive, endocrine therapy would be recommended in most cases and its use can reduce the risk of recurrence as well as improve survival. Side effects of treatment were briefly discussed. We also discussed the potential role for  chemotherapy based on a number of factors such as tumor phenotype (ER+ vs. triple negative vs. Apb6wfb+) and this would be determined in coordination with the medical oncologist.     MRI is still pending.      Long discussion with patient regarding next steps. Given disease burden, triple negative receptor status and young age, NACT is recommended. Patient declines in favor of surgery first.      The patient, in consultation with her family, has elected to proceed with bilateral mastectomy and sentinel lymph node biopsy with reconstruction. Will refer to plastic surgeon to discuss. The operative risks of bleeding, infection, recurrence, scarring, and anesthetic complications and the possibility of requiring further surgery were all noted and informed consent obtained.     Surgery scheduled. Follow-up in clinic roughly 14 days after surgery.      Patient was educated on breast cancer, receptors, wire localization lumpectomy, mastectomy, sentinel lymph node mapping and biopsy, axillary lymph node dissection, reconstruction, breast prosthesis with post-mastectomy bra and radiation therapy. Patient was given patient information binder including [facility].  All her questions were answered.

## 2024-02-21 NOTE — ANESTHESIA PREPROCEDURE EVALUATION
02/21/2024  Alisah Cardenas is a 33 y.o., female.      Pre-op Assessment    I have reviewed the Patient Summary Reports.     I have reviewed the Nursing Notes. I have reviewed the NPO Status.   I have reviewed the Medications.     Review of Systems  Anesthesia Hx:             Denies Family Hx of Anesthesia complications.    Denies Personal Hx of Anesthesia complications.                    Social:  Non-Smoker       Hematology/Oncology:  Hematology Normal   Oncology Normal                                   EENT/Dental:  EENT/Dental Normal           Cardiovascular:  Cardiovascular Normal                                            Pulmonary:    Asthma                    Renal/:  Renal/ Normal                 Hepatic/GI:  Hepatic/GI Normal                 Musculoskeletal:  Musculoskeletal Normal                Neurological:  Neurology Normal                                      Endocrine:  Endocrine Normal            Dermatological:  Skin Normal    Psych:  Psychiatric Normal                  Physical Exam  General: Well nourished and Alert    Airway:  Mallampati: II   Mouth Opening: Normal  Tongue: Normal    Dental:  Intact      Anesthesia Plan  Type of Anesthesia, risks & benefits discussed:    Anesthesia Type: Gen ETT  Intra-op Monitoring Plan: Standard ASA Monitors  Post Op Pain Control Plan: multimodal analgesia  Induction:  IV  Airway Plan: Video  Informed Consent: Informed consent signed with the Patient and all parties understand the risks and agree with anesthesia plan.  All questions answered.   ASA Score: 2  Anesthesia Plan Notes:       Ready For Surgery From Anesthesia Perspective.     .

## 2024-02-21 NOTE — OR NURSING
Pt states pain tolerable. No change from previous assessment. Room unavailable in hospital at this time. Mother updated by phone on pt status and room status.

## 2024-02-21 NOTE — BRIEF OP NOTE
[facility] ([place])  Brief Operative Note    SUMMARY     Surgery Date: 2/21/2024     Surgeon(s) and Role:  Panel 1:     * Ivy Posada MD - Primary  Panel 2:     * Mehran Street DO - Primary    Assisting Surgeon: Man Rosa     Pre-op Diagnosis:  Malignant neoplasm of upper-inner quadrant of left female breast, negative estrogen receptor status    Post-op Diagnosis:  Post-Op Diagnosis Codes:  same    Procedure(s) (LRB):  MASTECTOMY (Left)  BIOPSY, LYMPH NODE, DEEP SENTINEL (Left)  INJECTION, FOR SENTINEL NODE IDENTIFICATION (Left)  AXILLARY LYMPHADENECTOMY (Left)  INSERTION, TISSUE EXPANDER, BREAST /  UNLATERAL LEFT TISSUE EXPANDER (Left)  To be dictated separately by plastics    Anesthesia: General    Implants:  * No implants in log *    Operative Findings: Left mastectomy and ax dissection completed. Hemostatic at our portion's end. Radiologically confirmed clip in lymph node and in mastectomy specimen.     Estimated Blood Loss: Less than 50cc    Estimated Blood Loss has been documented.         Specimens:   Specimen (24h ago, onward)       Start     Ordered    02/21/24 1042  Specimen to Pathology, Surgery Breast  Once        Comments: Pre-op Diagnosis: Malignant neoplasm of upper-inner quadrant of left female breast, unspecified estrogen receptor status [C50.212]Procedure(s):MASTECTOMYBIOPSY, LYMPH NODE, SENTINELINJECTION, FOR SENTINEL NODE IDENTIFICATIONMASTECTOMY, WITH SENTINEL NODE BIOPSY AND AXILLARY LYMPHADENECTOMYINSERTION, TISSUE EXPANDER, BREAST /  UNLATERAL LEFT TISSUE EXPANDER Number of specimens: 3Name of specimens: 1. Left breast ( long stitch: LATERAL, short stitch: SUPERIOR, looped: subareolar)2. Left Axillary Vantage Lymph node hot and clipped at 5483. Left Axillary Vantage Lymph node hot 1174.     References:    Click here for ordering Quick Tip   Question Answer Comment   Procedure Type: Breast    Specimen Class: Known or suspected malignancy    Which provider would you  like to cc? CHANO AYALA.    Release to patient Immediate        02/21/24 1102                    [id number]

## 2024-02-21 NOTE — ANESTHESIA POSTPROCEDURE EVALUATION
Anesthesia Post Evaluation    Patient: Alisha Cardenas    Procedure(s) Performed: Procedure(s) (LRB):  MASTECTOMY (Left)  BIOPSY, LYMPH NODE, SENTINEL (Left)  INJECTION, FOR SENTINEL NODE IDENTIFICATION (Left)  MASTECTOMY, WITH SENTINEL NODE BIOPSY AND AXILLARY LYMPHADENECTOMY (Left)  INSERTION, TISSUE EXPANDER, BREAST /  UNLATERAL LEFT TISSUE EXPANDER (Left)    Final Anesthesia Type: general      Patient location during evaluation: PACU  Patient participation: Yes- Able to Participate  Level of consciousness: awake and alert  Post-procedure vital signs: reviewed and stable  Pain management: adequate  Airway patency: patent    PONV status at discharge: No PONV  Anesthetic complications: no      Cardiovascular status: blood pressure returned to baseline  Respiratory status: unassisted, spontaneous ventilation and room air  Hydration status: euvolemic  Follow-up not needed.          Vitals Value Taken Time   /75 02/21/24 1501   Temp 36.1 °C (97 °F) 02/21/24 1248   Pulse 79 02/21/24 1507   Resp 16 02/21/24 1329   SpO2 100 % 02/21/24 1507   Vitals shown include unvalidated device data.      No case tracking events are documented in the log.      Pain/Rimma Score: Pain Rating Prior to Med Admin: 7 (2/21/2024  1:29 PM)  Pain Rating Post Med Admin: 3 (states tolerable. no change.) (2/21/2024  1:48 PM)  Rimma Score: 8 (2/21/2024  2:48 PM)

## 2024-02-21 NOTE — H&P
Plastic Surgery History & Physical     SUBJECTIVE:   Chief complaint: Preoperative visit for tissue expander placement     History of Present Illness:  33 y.o. female presenting to plastic surgery clinic for a preoperative visit. The patient was last seen on 24 at which time we discussed immediate left tissue expander placement.  Patient understands the details of tissue expander placement and has elected to undergo surgery on 24. The long term surgical plan for the patient includes bridge to autologous reconstruction.  She was expected to need adjuvant radiotherapy.  She declined chemotherapy for her large triple negative tumor  Since the last clinic visit there have been no significant changes in the patient's history.          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 2014 9:22:57 AM     [facility] Historical - [facility]: , habitual w/o pregnancy-No Additional Notes    Missed  2014 9:22:49 AM     [facility] Historical - [facility]: , Missed, Less than 22 Weeks-No Additional Notes    Prior miscarriage with pregnancy in first trimester, antepartum      Prior miscarriage with pregnancy in first trimester, antepartum      Prior miscarriage with pregnancy in second trimester, antepartum                 Past Surgical History:   Procedure Laterality Date    DILATION AND CURETTAGE OF UTERUS        MYOMECTOMY N/A 2022     Procedure: MYOMECTOMY;  Surgeon: Renzo Montoya III, MD;  Location: [facility];  Service: OB/GYN;  Laterality: N/A;    none        TONSILLECTOMY                   Family History   Problem Relation Age of Onset    No Known Problems Mother      No Known Problems Father      Vaginal cancer Neg Hx       Endometrial cancer Neg Hx      Cervical cancer Neg Hx           Social History            Socioeconomic History    Marital status: Single   Tobacco Use    Smoking status: Never    Smokeless tobacco: Never   Substance and Sexual Activity    Alcohol use: Yes       Comment: weekends    Drug use: No    Sexual activity: Yes       Partners: Male       Birth control/protection: OCP      Social Determinants of Health           Financial Resource Strain: Medium Risk (11/27/2023)     Overall Financial Resource Strain (CARDIA)      Difficulty of Paying Living Expenses: Somewhat hard   Food Insecurity: No Food Insecurity (11/27/2023)     Hunger Vital Sign      Worried About Running Out of Food in the Last Year: Never true      Ran Out of Food in the Last Year: Never true   Transportation Needs: No Transportation Needs (11/27/2023)     PRAPARE - Transportation      Lack of Transportation (Medical): No      Lack of Transportation (Non-Medical): No   Physical Activity: Inactive (11/27/2023)     Exercise Vital Sign      Days of Exercise per Week: 0 days      Minutes of Exercise per Session: 0 min   Stress: No Stress Concern Present (11/27/2023)     [employer] [employer] - Occupational Stress Questionnaire      Feeling of Stress : Only a little   Social Connections: Unknown (11/27/2023)     Social Connection and Isolation Panel [NHANES]      Frequency of Communication with Friends and Family: Once a week      Frequency of Social Gatherings with Friends and Family: Once a week      Active Member of Clubs or Organizations: No      Attends Club or Organization Meetings: Never      Marital Status: Never    Housing Stability: High Risk (11/27/2023)     Housing Stability Vital Sign      Unable to Pay for Housing in the Last Year: Yes      Number of Places Lived in the Last Year: 1      Unstable Housing in the Last Year: No         Current Medications          Current Outpatient Medications   Medication Sig  Dispense Refill    aspirin/caffeine (BC PAIN RELIEF ORAL) Take by mouth as needed.        cetirizine HCl (ZYRTEC ORAL) Take by mouth once daily at 6am.        ferrous sulfate (IRON ORAL) Take by mouth as needed.        multivit,calc,mins/iron/folic (WOMEN'S ONE DAILY ORAL) Take by mouth once daily.          No current facility-administered medications for this visit.                 Review of patient's allergies indicates:   Allergen Reactions    Iodine Anaphylaxis    Shellfish containing products Anaphylaxis               OBJECTIVE:      There were no vitals taken for this visit.        Physical Exam:  Gen: NAD, appears stated age  Neuro: normal without focal findings, mental status and speech normal  HEENT: NCAT, neck supple, PEERL  CV: RRR  Pulm: Breathing non-labored, chest wall movement equal bilaterally   Breast: Exam deferred, reviewed medical photography  Abdomen: soft, nontender, no guarding  Extremity:normal strength, no cyanosis or edema  Skin: Skin color, texture, turgor normal. No rashes or lesions  Psych: oriented to time, place and person, mood and affect are within normal limits              ASSESSMENT/PLAN:      Alisha Cardenas was seen preoperatively today for Tissue Expander based breast reconstruction.  Planning for left nipple sparing mastectomy with pre-pec tissue expander placement without ADM     We discussed tissue expander based reconstruction, the possible use of ADM, drains and expected post-operative course.  Risks including: hematoma, seroma, infection, extrusion, capsular contracture, risks of rupture, malposition, wound breakdown, scarring, need for reoperation, blood clots were all covered. Discussed importance of patient wearing post operative garment as instructed.   Consents signed     Patient understands tissue expanders are temporary placeholders for long term reconstruction planning. In most cases the expander is left flat or partially filled with air in the operating room  and the patient will undergo tissue expander fills with air/saline in the clinic as appropriate.      Reviewed multimodal pain control regimen used in the post operative period. Prescriptions will be sent to the outpatient pharmacy the day of surgery.  The patient was given a packet including general instructions for post-operative care, instructions for the day of surgery, drain care and process to complete FMLA/Disability paperwork.  All questions were answered. The patient was given a business card with contact info and advised to contact the clinic with any questions or concerns before or after surgery.    Agree with above previously written H and P there have been no changes

## 2024-02-22 VITALS
OXYGEN SATURATION: 96 % | RESPIRATION RATE: 18 BRPM | HEART RATE: 83 BPM | WEIGHT: 186.06 LBS | DIASTOLIC BLOOD PRESSURE: 79 MMHG | SYSTOLIC BLOOD PRESSURE: 117 MMHG | HEIGHT: 61 IN | TEMPERATURE: 98 F | BODY MASS INDEX: 35.13 KG/M2

## 2024-02-22 LAB
BASOPHILS # BLD AUTO: 0.01 K/UL (ref 0–0.2)
BASOPHILS NFR BLD: 0.1 % (ref 0–1.9)
DIFFERENTIAL METHOD BLD: ABNORMAL
EOSINOPHIL # BLD AUTO: 0 K/UL (ref 0–0.5)
EOSINOPHIL NFR BLD: 0.1 % (ref 0–8)
ERYTHROCYTE [DISTWIDTH] IN BLOOD BY AUTOMATED COUNT: 14.6 % (ref 11.5–14.5)
HCT VFR BLD AUTO: 33.1 % (ref 37–48.5)
HGB BLD-MCNC: 10 G/DL (ref 12–16)
IMM GRANULOCYTES # BLD AUTO: 0.07 K/UL (ref 0–0.04)
IMM GRANULOCYTES NFR BLD AUTO: 0.6 % (ref 0–0.5)
LYMPHOCYTES # BLD AUTO: 2.3 K/UL (ref 1–4.8)
LYMPHOCYTES NFR BLD: 20 % (ref 18–48)
MCH RBC QN AUTO: 23.4 PG (ref 27–31)
MCHC RBC AUTO-ENTMCNC: 30.2 G/DL (ref 32–36)
MCV RBC AUTO: 78 FL (ref 82–98)
MONOCYTES # BLD AUTO: 0.9 K/UL (ref 0.3–1)
MONOCYTES NFR BLD: 7.6 % (ref 4–15)
NEUTROPHILS # BLD AUTO: 8.1 K/UL (ref 1.8–7.7)
NEUTROPHILS NFR BLD: 71.6 % (ref 38–73)
NRBC BLD-RTO: 0 /100 WBC
PLATELET # BLD AUTO: 473 K/UL (ref 150–450)
PMV BLD AUTO: 9.5 FL (ref 9.2–12.9)
RBC # BLD AUTO: 4.27 M/UL (ref 4–5.4)
WBC # BLD AUTO: 11.25 K/UL (ref 3.9–12.7)

## 2024-02-22 PROCEDURE — 63600175 PHARM REV CODE 636 W HCPCS: Performed by: STUDENT IN AN ORGANIZED HEALTH CARE EDUCATION/TRAINING PROGRAM

## 2024-02-22 PROCEDURE — 25000003 PHARM REV CODE 250: Performed by: SURGERY

## 2024-02-22 PROCEDURE — 25000003 PHARM REV CODE 250: Performed by: STUDENT IN AN ORGANIZED HEALTH CARE EDUCATION/TRAINING PROGRAM

## 2024-02-22 PROCEDURE — 63600175 PHARM REV CODE 636 W HCPCS: Performed by: SURGERY

## 2024-02-22 PROCEDURE — 85025 COMPLETE CBC W/AUTO DIFF WBC: CPT | Performed by: STUDENT IN AN ORGANIZED HEALTH CARE EDUCATION/TRAINING PROGRAM

## 2024-02-22 PROCEDURE — 36415 COLL VENOUS BLD VENIPUNCTURE: CPT | Performed by: STUDENT IN AN ORGANIZED HEALTH CARE EDUCATION/TRAINING PROGRAM

## 2024-02-22 PROCEDURE — 25000003 PHARM REV CODE 250

## 2024-02-22 RX ORDER — GABAPENTIN 300 MG/1
300 CAPSULE ORAL 3 TIMES DAILY
Qty: 21 CAPSULE | Refills: 0 | Status: SHIPPED | OUTPATIENT
Start: 2024-02-22 | End: 2024-03-05 | Stop reason: SDUPTHER

## 2024-02-22 RX ORDER — ACETAMINOPHEN 500 MG
1000 TABLET ORAL EVERY 8 HOURS
Qty: 42 TABLET | Refills: 0
Start: 2024-02-22 | End: 2024-02-29

## 2024-02-22 RX ORDER — LANOLIN ALCOHOL/MO/W.PET/CERES
1 CREAM (GRAM) TOPICAL DAILY
Status: DISCONTINUED | OUTPATIENT
Start: 2024-02-22 | End: 2024-02-22 | Stop reason: HOSPADM

## 2024-02-22 RX ORDER — OXYCODONE HYDROCHLORIDE 5 MG/1
5 TABLET ORAL EVERY 4 HOURS PRN
Qty: 10 TABLET | Refills: 0 | Status: SHIPPED | OUTPATIENT
Start: 2024-02-22 | End: 2024-03-28 | Stop reason: CLARIF

## 2024-02-22 RX ORDER — IBUPROFEN 600 MG/1
600 TABLET ORAL EVERY 6 HOURS
Qty: 28 TABLET | Refills: 0 | Status: SHIPPED | OUTPATIENT
Start: 2024-02-22 | End: 2024-03-05 | Stop reason: SDUPTHER

## 2024-02-22 RX ORDER — CEPHALEXIN 500 MG/1
500 CAPSULE ORAL EVERY 12 HOURS
Qty: 28 CAPSULE | Refills: 0 | Status: SHIPPED | OUTPATIENT
Start: 2024-02-22 | End: 2024-03-05 | Stop reason: SDUPTHER

## 2024-02-22 RX ORDER — METHOCARBAMOL 500 MG/1
500 TABLET, FILM COATED ORAL 3 TIMES DAILY
Qty: 20 TABLET | Refills: 0 | Status: SHIPPED | OUTPATIENT
Start: 2024-02-22 | End: 2024-03-05 | Stop reason: SDUPTHER

## 2024-02-22 RX ADMIN — FERROUS SULFATE TAB 325 MG (65 MG ELEMENTAL FE) 1 EACH: 325 (65 FE) TAB at 08:02

## 2024-02-22 RX ADMIN — OXYCODONE HYDROCHLORIDE 10 MG: 5 TABLET ORAL at 01:02

## 2024-02-22 RX ADMIN — DEXTROSE MONOHYDRATE 1 G: 2.5 INJECTION INTRAVENOUS at 01:02

## 2024-02-22 RX ADMIN — ACETAMINOPHEN 650 MG: 325 TABLET, FILM COATED ORAL at 10:02

## 2024-02-22 RX ADMIN — OXYCODONE HYDROCHLORIDE 10 MG: 5 TABLET ORAL at 10:02

## 2024-02-22 RX ADMIN — OXYCODONE HYDROCHLORIDE 10 MG: 5 TABLET ORAL at 05:02

## 2024-02-22 RX ADMIN — METHOCARBAMOL 500 MG: 500 TABLET ORAL at 08:02

## 2024-02-22 RX ADMIN — ENOXAPARIN SODIUM 40 MG: 40 INJECTION SUBCUTANEOUS at 08:02

## 2024-02-22 RX ADMIN — DEXTROSE MONOHYDRATE 1 G: 2.5 INJECTION INTRAVENOUS at 10:02

## 2024-02-22 RX ADMIN — HYDROCODONE BITARTRATE AND ACETAMINOPHEN 1 TABLET: 5; 325 TABLET ORAL at 07:02

## 2024-02-22 RX ADMIN — ACETAMINOPHEN 650 MG: 325 TABLET, FILM COATED ORAL at 12:02

## 2024-02-22 RX ADMIN — DOCUSATE SODIUM 100 MG: 100 CAPSULE, LIQUID FILLED ORAL at 08:02

## 2024-02-22 NOTE — OR NURSING
Pt's V/S stable on 2L oxygen nasal cannula while in PACU. S/p left mastectomy and TE insertion, incision sites clean/dry/intact. JAIRO drains to bulb suction with output as documented. PRN pain meds given for moderate incisional pain with good relief noted. Report given to PHILIP Rodriguez on [address]. Safety precautions in place. Family updated on plan of care.

## 2024-02-22 NOTE — OR NURSING
Patient resting quietly no distress noted. Call light in reach, Mother at bedside. Report given to oncoming shift   Opt out

## 2024-02-22 NOTE — DISCHARGE INSTRUCTIONS
Your Surgeon Gave You EXPAREL® (bupivacaine liposome injectable suspension)    To help control your pain after surgery, your surgeon injected EXPAREL into your surgical incision just before the end of the procedure.   EXPAREL is a local analgesic that contains the local anesthetic bupivacaine. Local anesthetics provide pain relief by numbing the tissue  around the surgical site.   EXPAREL is specifically designed to release pain medication over time and can control pain for up to 72 hours.   In addition to EXPAREL, your surgeon may provide other pain medications to control your pain.   Each patient is different and responds differently to painmedication. Depending on how you respond to EXPAREL, you may require less  additional pain medication during your recovery.    Possible Side Effects    Side effects can occur with any medication and it is important not to ignore anything you may be experiencing. Some patients who  received EXPAREL experienced nausea, vomiting, or constipation. Rarely, patients who receive bupivacaine (the active ingredient in  EXPAREL) have experienced numbness and tingling in their mouth or lips, lightheadedness, or anxiety. Speak with your doctor right  away if you think you may be experiencing any of these sensations, or if you have other questions regarding possible side effects.    Your Recovery    When your pain is under control, your body can better focus on healing. This is not the time to test your pain tolerance, or grin and  bear it.Work with your surgeon and nurse to make your recovery as speedy and pain-free as possible.   Follow the post-op orders your nurse gave you.   Eat a healthy diet and drink plenty of water. Surgery stresses your body; your body responds by needing more energy to heal.      Important Information About EXPAREL  Products that contain bupivacaine, like EXPAREL, may cause a temporary loss of  sensation or the ability to move in the area where bupivacaine  was injected.    Date Administered: 2/21/2024  Time Administered: 12:00 pm    Other Forms of Bupivicaine should not be administered within 96hrs following administration of EXPAREL.

## 2024-02-22 NOTE — OP NOTE
Operative Note     2/21/2024    PRE-OP DIAGNOSIS: Malignant neoplasm of upper-inner quadrant of left female breast, negative estrogen receptor status      POST-OP DIAGNOSIS: Post-Op Diagnosis Codes:   same    Procedure(s):  MASTECTOMY TOTAL NIPPLE SPARING LEFT  BIOPSY, LYMPH NODE, DEEP SENTINEL LEFT  INJECTION, FOR SENTINEL NODE IDENTIFICATION LEFT  AXILLARY LYMPHADENECTOMY LEFT  INSERTION, TISSUE EXPANDER, BREAST /  UNLATERAL LEFT TISSUE EXPANDER - to be dictated separately by Dr. Street    SURGEON: Surgeon(s) and Role:  Panel 1:     * Ivy Posada MD - Primary  Resident: Man Rosa    Panel 2:     * Mehran Street DO - Primary    ANESTHESIA: General     OPERATIVE FINDINGS: HEalthy appearing skin and nipple at the completion of the mastectomy.      INDICATION FOR PROCEDURE: This patient presents with a history of invasive carcinoma of the left breast    PROCEDURE IN DETAIL:  Alisha Cardenas is a 33 y.o. female brought to the operating room for definitive surgery of invasive carcinoma of the left breast.  The patient has elected to undergo left nipple sparing mastectomy with left axillary sentinel lymph node biopsy for ashwini assessment. The patient was informed of the possible risks and complications of the procedure, including but not limited to anesthetic risks, bleeding, infection, and need for additional surgery.  The patient concurred with the proposed plan, and has given informed consent.  The site of surgery was properly noted/marked in the preoperative holding area.    The patient was then brought to the operating room and placed in the supine position with both upper extremities extended.  general anesthesia was administered. Perioperative antibiotics were administered consisting of Ancef and a time out was performed confirming the patient, site, and procedure.  The patient's left breast was injected with technetium to facilitate sentinel lymph node identification.The left chest and axilla  was then prepped and draped in the usual sterile fashion.    We first turned our attention to the left axilla.    The gamma probe was used to identify an area of increased radioactivity within the lower axilla.  A small incision was made in a transverse inferior fashion just beneath the hairline in the axilla. The clavipectoral sheath was sharply incised to reveal the level I axillary lymph nodes. The probe was used to identify a single node with increased radioactivity.  This node was brought into the operative field and carefully dissected free of the surrounding lymphovascular structures.  The highest ex vivo count of the node was 548, clipped and the second node was 117.  The node was then sent to pathology for frozen section evaluation, labeled as sentinel node #1.  A total of 2 axillary sentinel nodes and 0 axillary non-sentinel nodes were identified, excised and submitted to pathology.  Bed counts were obtained to confirm that the 10% rule had not been violated.   We did then palpate an additional node that was suspicious along with the first two nodes and opted to proceed with axillary lymphadenectomy due to clinical suspicion and the patient being unwilling to adhere to the recommended treatment plan, although young in age.    We then turned our attention back to the left breast where an inferior vertical incision was fashioned sparing the nipple areolar complex.  The incision was made with a plasmablade and extended through the subcutaneous tissues with plasmablade.  Skin flaps were raised to the clavicle superiorly.  We then proceeded to raise the remainder of the flaps to the lateral border of the sternum medially, to the inframammary fold inferiorly, and to the anterior border of the latissimus dorsi muscle laterally.  Lighted retractors was used to assist in the creation of the skin flaps. The tissue beneath the nipple areolar complex was sharply dissected being careful to dissect to the dermis.  We  were careful to ensure that the ductal tissue beneath the nipple was removed. The breast tissue was sharply excised off the chest wall taking care to incorporate the pectoralis fascia while leaving the serratus fascia behind.  The resulting mastectomy specimen was marked using a short stitch superiorly and long stitch laterally, looped stitch subareolar.  The breast was sent to pathology for permanent evaluation.    We were careful to dissect over the tumor with a margin of normal fatty tissue overlying but did have a bleeding vessel superficial to the tumor that required cauterization and several clips, which may influence the margin.  Ultrasound of the margin appeared clear over the anterior surface of the tumor.    Frozen section ashwini evaluation revealed no evidence of metastatic disease.  However, many nodes appeared abnormal on ultrasound, felt abnormal and were enlarged, so we proceeded to carefully dissect out lymphatics and intercostal-brachial nerves for preservation as we proceeded to an axillary lymphadenectomy.    An  incision was made in the left axilla. An axillary dissection was performed with removal of the associated lymph nodes and surrounding adipose tissue. This included levels I and II. This was accomplished by exposing the axillary vein superiorly. Small venous tributaries, lymphatics, and vessels were clipped and ligated or cauterized and divided. The subscapularis muscle was skeletonized. The long thoracic and thoracodorsal neurovascular bundles were identified and preserved. The tissue between the long thoracic and thoracodorsal bundle was removed.  Of note, at the end of the dissection, level 3 nodes were palpated and no abnormal nodes were noted.          Instrument, sponge, and needle counts were correct at closure and at the conclusion of the case.    Therefore, the operative field was irrigated with normal saline and all bleeding points were secured.  The incision will be closed by the  plastic surgery service.        ESTIMATED BLOOD LOSS: less than 50 mL      COMPLICATIONS: none    DISPOSITION: intraop transfer to the plastic surgery service    ATTESTATION:   I was present and scrubbed for the entire procedure.    [place] Node Biopsy for Breast Cancer - Left  Operation performed with curative intent Yes   Tracer(s) used to identify sentinel nodes in the upfront surgery (non-neoadjuvant) setting Radioactive tracer   Tracer(s) used to identify sentinel nodes in the neoadjuvant setting N/A   All nodes (colored or non-colored) present at the end of a dye-filled lymphatic channel were removed N/A   All significantly radioactive nodes were removed Yes   All palpably suspicious nodes were removed Yes   Biopsy-proven positive nodes marked with clips prior to chemotherapy were identified and removed N/A    and Axillary Lymph Node Dissection for Breast Cancer - Left  Operation performed with curative intent Yes   Resection was performed within the boundaries of the axillary vein, chest wall (serratus anterior), and latissimus dorsi Yes   Nerves identified and preserved during dissection Long thoracic nerve, Thoracodorsal nerve, and Branches of the intercostobrachial nerves   Level III nodes were removed No

## 2024-02-22 NOTE — DISCHARGE SUMMARY
[facility] - Surgery ([place])  Discharge Note  Short Stay    Procedure(s) (LRB):  MASTECTOMY (Left)  BIOPSY, LYMPH NODE, SENTINEL (Left)  INJECTION, FOR SENTINEL NODE IDENTIFICATION (Left)  MASTECTOMY, WITH SENTINEL NODE BIOPSY AND AXILLARY LYMPHADENECTOMY (Left)  INSERTION, TISSUE EXPANDER, BREAST /  UNLATERAL LEFT TISSUE EXPANDER (Left)      OUTCOME: Patient tolerated treatment/procedure well without complication and is now ready for discharge.    DISPOSITION: Home or Self Care    FINAL DIAGNOSIS:  <principal problem not specified>    FOLLOWUP: In clinic    DISCHARGE INSTRUCTIONS:    Discharge Procedure Orders   Diet Adult Regular     Other restrictions (specify):   Order Comments: No heavy lifting heavy pulling or pushing exercises or lifting more than 5lbs on implant side     Notify your health care provider if you experience any of the following:  increased confusion or weakness     Notify your health care provider if you experience any of the following:  persistent dizziness, light-headedness, or visual disturbances     Notify your health care provider if you experience any of the following:  worsening rash     Notify your health care provider if you experience any of the following:  severe persistent headache     Notify your health care provider if you experience any of the following:  difficulty breathing or increased cough     Notify your health care provider if you experience any of the following:  redness, tenderness, or signs of infection (pain, swelling, redness, odor or green/yellow discharge around incision site)     Notify your health care provider if you experience any of the following:  severe uncontrolled pain     Notify your health care provider if you experience any of the following:  persistent nausea and vomiting or diarrhea     Notify your health care provider if you experience any of the following:  temperature >100.4     Leave dressing on - Keep it clean, dry, and intact until clinic visit         TIME SPENT ON DISCHARGE: 10 minutes

## 2024-02-27 ENCOUNTER — OFFICE VISIT (OUTPATIENT)
Dept: PLASTIC SURGERY | Facility: CLINIC | Age: 34
End: 2024-02-27
Payer: COMMERCIAL

## 2024-02-27 VITALS
HEIGHT: 61 IN | WEIGHT: 186.06 LBS | BODY MASS INDEX: 35.13 KG/M2 | OXYGEN SATURATION: 98 % | RESPIRATION RATE: 19 BRPM | HEART RATE: 83 BPM | DIASTOLIC BLOOD PRESSURE: 76 MMHG | SYSTOLIC BLOOD PRESSURE: 110 MMHG

## 2024-02-27 DIAGNOSIS — Z09 SURGERY FOLLOW-UP: Primary | ICD-10-CM

## 2024-02-27 PROCEDURE — 99999 PR PBB SHADOW E&M-EST. PATIENT-LVL III: CPT | Mod: PBBFAC,,, | Performed by: SURGERY

## 2024-02-27 PROCEDURE — 99024 POSTOP FOLLOW-UP VISIT: CPT | Mod: S$GLB,,, | Performed by: SURGERY

## 2024-02-27 NOTE — PROGRESS NOTES
Alisha Cardenas femalepresents to Plastic Surgery Clinic for a follow up visit status post left mastectomy and ALND with tissue expander placement on 2/21/24. She has 600cc Artoura tissue expanders. Patient is doing well today with no issues since surgery. Her drain output is moderate high.       PHYSICAL EXAMINATION  Left breast incision(s) well approximated with no issues.   Nipple(s) is/are well perfused   Drains are in place with moderate output       The fill ports on the right and left tissue expander was marked using the magnet and a pen.  The skin was then prepped using chloroprep.  Using sterile technique the right port was accessed with a 21G butterfly needle. Air was added to the expander.  The skin was checked for tension and cap refill.  The same was done on the contralateral breast    Air added          ASSESSMENT/PLAN  Alisha Cardenas is s/p left mastectomy with TE  - Reviewed process of expansion, normal intervals, and performed first air fill.  - JAIRO drain(s) remains in place, continue empiric antibiotics  - Follow up 1 weeks for drain evaluation and expansion      All questions were answered. The patient was advised to contact the clinic with any questions or concerns prior to their next visit.       Nissa Adhikari PA-C  Plastic and Reconstructive Surgery

## 2024-03-05 ENCOUNTER — OFFICE VISIT (OUTPATIENT)
Dept: PLASTIC SURGERY | Facility: CLINIC | Age: 34
End: 2024-03-05
Payer: COMMERCIAL

## 2024-03-05 VITALS
HEART RATE: 83 BPM | RESPIRATION RATE: 19 BRPM | DIASTOLIC BLOOD PRESSURE: 76 MMHG | SYSTOLIC BLOOD PRESSURE: 110 MMHG | BODY MASS INDEX: 35.13 KG/M2 | HEIGHT: 61 IN | WEIGHT: 186.06 LBS | OXYGEN SATURATION: 99 %

## 2024-03-05 DIAGNOSIS — Z09 SURGERY FOLLOW-UP: Primary | ICD-10-CM

## 2024-03-05 PROCEDURE — 99024 POSTOP FOLLOW-UP VISIT: CPT | Mod: S$GLB,,, | Performed by: SURGERY

## 2024-03-05 PROCEDURE — 99999 PR PBB SHADOW E&M-EST. PATIENT-LVL III: CPT | Mod: PBBFAC,,, | Performed by: SURGERY

## 2024-03-05 RX ORDER — METHOCARBAMOL 500 MG/1
500 TABLET, FILM COATED ORAL 3 TIMES DAILY
Qty: 20 TABLET | Refills: 0 | Status: SHIPPED | OUTPATIENT
Start: 2024-03-05 | End: 2024-03-12

## 2024-03-05 RX ORDER — TRAMADOL HYDROCHLORIDE 50 MG/1
50 TABLET ORAL EVERY 6 HOURS PRN
Qty: 15 TABLET | Refills: 0 | Status: SHIPPED | OUTPATIENT
Start: 2024-03-05 | End: 2024-03-28 | Stop reason: CLARIF

## 2024-03-05 RX ORDER — GABAPENTIN 300 MG/1
300 CAPSULE ORAL 3 TIMES DAILY
Qty: 21 CAPSULE | Refills: 0 | Status: SHIPPED | OUTPATIENT
Start: 2024-03-05 | End: 2024-03-28 | Stop reason: CLARIF

## 2024-03-05 RX ORDER — CEPHALEXIN 500 MG/1
500 CAPSULE ORAL EVERY 12 HOURS
Qty: 14 CAPSULE | Refills: 0 | Status: SHIPPED | OUTPATIENT
Start: 2024-03-05 | End: 2024-03-12

## 2024-03-05 RX ORDER — IBUPROFEN 600 MG/1
600 TABLET ORAL EVERY 6 HOURS
Qty: 28 TABLET | Refills: 0 | Status: SHIPPED | OUTPATIENT
Start: 2024-03-05 | End: 2024-03-12

## 2024-03-05 NOTE — PROGRESS NOTES
Alisha Cardenas femalepresents to Plastic Surgery Clinic for a follow up visit status post left mastectomy with tissue expander placement on 2/21/24. She has 600cc Artoura tissue expanders. She reports moderate output from her JAIRO drains. She continues to report pain in the left axilla especially.    PHYSICAL EXAMINATION  Left breast incision(s) well approximated with no issues.   Nipple(s) is/are well perfused   Drains are in place with moderate output       The fill ports on the right and left tissue expander was marked using the magnet and a pen.  The skin was then prepped using chloroprep.  Using sterile technique the right port was accessed with a 21G butterfly needle. Air removed and sterile injectable saline was added to the expander.  The skin was checked for tension and cap refill.  The same was done on the contralateral breast    Left volume added: 360 cc                     ASSESSMENT/PLAN  Alisha Cardenas is s/p left mastectomy and ALND with TE  - Expanded today, patient tolerated well  - Continue antibiotics, 1 drain removed, 1 remains in place  - Rx tramadol for pain, but encouraged as prn if not controlled with multimodals  - Follow up 1 week      All questions were answered. The patient was advised to contact the clinic with any questions or concerns prior to their next visit.       Nissa Adhikari PA-C  Plastic and Reconstructive Surgery

## 2024-03-07 ENCOUNTER — OFFICE VISIT (OUTPATIENT)
Dept: SURGERY | Facility: CLINIC | Age: 34
End: 2024-03-07
Payer: COMMERCIAL

## 2024-03-07 ENCOUNTER — PATIENT MESSAGE (OUTPATIENT)
Dept: SURGERY | Facility: CLINIC | Age: 34
End: 2024-03-07

## 2024-03-07 ENCOUNTER — TELEPHONE (OUTPATIENT)
Dept: RADIOLOGY | Facility: HOSPITAL | Age: 34
End: 2024-03-07
Payer: COMMERCIAL

## 2024-03-07 VITALS
HEART RATE: 81 BPM | OXYGEN SATURATION: 100 % | BODY MASS INDEX: 35.12 KG/M2 | DIASTOLIC BLOOD PRESSURE: 76 MMHG | SYSTOLIC BLOOD PRESSURE: 113 MMHG | HEIGHT: 61 IN | WEIGHT: 186 LBS

## 2024-03-07 DIAGNOSIS — C50.212 CARCINOMA OF UPPER-INNER QUADRANT OF LEFT BREAST IN FEMALE, ESTROGEN RECEPTOR NEGATIVE: Primary | ICD-10-CM

## 2024-03-07 DIAGNOSIS — Z17.1 CARCINOMA OF UPPER-INNER QUADRANT OF LEFT BREAST IN FEMALE, ESTROGEN RECEPTOR NEGATIVE: Primary | ICD-10-CM

## 2024-03-07 PROCEDURE — 99024 POSTOP FOLLOW-UP VISIT: CPT | Mod: S$GLB,,, | Performed by: SURGERY

## 2024-03-07 PROCEDURE — 99999 PR PBB SHADOW E&M-EST. PATIENT-LVL III: CPT | Mod: PBBFAC,,, | Performed by: SURGERY

## 2024-03-07 NOTE — TELEPHONE ENCOUNTER
----- Message from Bal Batista RN sent at 3/7/2024  2:16 PM CST -----  Cortney Delgado,  This pt is requesting a post op mammo in 3 mos.  The pt is aware her insurance may not pay.  She will pay out of pocket.  I put a note in the order.  Dr Posada wants to make sure everyone know.  Thanks       Initial (On Arrival)

## 2024-03-08 NOTE — PROGRESS NOTES
Post-Op  [facility]  Department of Surgery    PCP:  Mattie, Primary Doctor  MEDICAL ONCOLOGIST:    Dr. Barney  RADIATION ONCOLOGIST:   pending    DIAGNOSIS:    This is a 33 y.o. female with a stage pending grade 3 ER - [place] - HER2 - poorly differentiated invasive carcinoma of the left breast.    TREATMENT SUMMARY:  The patient is status post left nipple sparing mastectomy and sentinel node biopsy on 2/21/2024.  Final pathology showed     PRELIMINARY DIAGNOSIS:    1. LEFT BREAST, NIPPLE AND SKIN SPARING TOTAL MASTECTOMY:  -  Invasive poorly differentiated carcinoma with squamous features, [place] histologic grade 3; tumor size = 55 mm.  All surgical margins are free of invasive carcinoma; however, the invasive carcinoma is 2 mm from the posterior margin and 5 mm from  the anterior superior margin.  A twirl-shaped biopsy clip is grossly identified in the tumor mass.  -  Fibrocystic changes with columnar cell change, focal fibroadenomatoid changes, apocrine metaplasia, and stromal fibrosis.    -  Sclerosing lobular hyperplasia.    -  Focal duct ectasia.      2. LEFT AXILLARY SENTINEL LYMPH NODE (1), EXCISIONAL BIOPSY:  -  1 out of 1 lymph node is negative for malignancy on routine and immunohistochemistry staining; there is no evidence of metastatic carcinoma.    -  Moderate-to-marked reactive lymphoid hyperplasia.    -  Focal fibrosis; consistent biopsy site.  A twirl-shaped biopsy clip is grossly identified within the lymph node.    3. LEFT AXILLARY SENTINEL LYMPH NODE (1), EXCISIONAL BIOPSY:  -  1 out of 1 lymph node is negative for malignancy by routine and immunohistochemistry staining; there is no evidence of metastatic carcinoma.    -  Reactive lymphoid hyperplasia.    4. LEFT AXILLARY LYMPH NODES (15), LYMPHADENECTOMY:  -  15 out of 15 lymph nodes are negative for malignancy on routine staining; there is no evidence of metastatic carcinoma.    -  Mild-to-moderate reactive lymphoid hyperplasia, mixed  pattern.    COMMENT:  Please note that this is a preliminary diagnosis.  The differential diagnosis includes, but is not limited to, a metaplastic carcinoma with squamous differentiation and high-grade mucoepidermoid carcinoma.  This case will be sent to [facility]   for consultation; a final report and cancer case summary will follow after completion of the consultation.  Dr. Ivy Vazquez is notified of the above preliminary diagnosis and that the case is going to [facility] for consultation on 3/7/2024.     INTERVAL HISTORY:   Alisha Cardenas comes in for a post-op check.  She denies fever, chills, chest pain or shortness of breath.  Her pain is well controlled.      MEDICATIONS:  Current Outpatient Medications   Medication Sig Dispense Refill    albuterol sulfate (INV ALBUTEROL) 90 mcg inhalation Inhale 2 puffs into the lungs as needed (PRN wheeze). 2 puffs inhaled PRN      cephALEXin (KEFLEX) 500 MG capsule Take 1 capsule (500 mg total) by mouth every 12 (twelve) hours. for 7 days 14 capsule 0    cetirizine HCl (ZYRTEC ORAL) Take by mouth once daily at 6am.      ferrous sulfate (IRON ORAL) Take by mouth as needed.      gabapentin (NEURONTIN) 300 MG capsule Take 1 capsule (300 mg total) by mouth 3 (three) times daily. for 7 days 21 capsule 0    ibuprofen (ADVIL,MOTRIN) 600 MG tablet Take 1 tablet (600 mg total) by mouth every 6 (six) hours. for 7 days 28 tablet 0    methocarbamoL (ROBAXIN) 500 MG Tab Take 1 tablet (500 mg total) by mouth 3 (three) times daily. for 7 days 20 tablet 0    traMADoL (ULTRAM) 50 mg tablet Take 1 tablet (50 mg total) by mouth every 6 (six) hours as needed for Pain. 15 tablet 0    aspirin/caffeine (BC PAIN RELIEF ORAL) Take by mouth as needed.      multivit,calc,mins/iron/folic (WOMEN'S ONE DAILY ORAL) Take by mouth once daily.      oxyCODONE (ROXICODONE) 5 MG immediate release tablet Take 1 tablet (5 mg total) by mouth every 4 (four) hours as needed for Pain. 10 tablet 0     No  current facility-administered medications for this visit.       ALLERGIES:   Review of patient's allergies indicates:   Allergen Reactions    Iodine Anaphylaxis    Shellfish containing products Anaphylaxis       PHYSICAL EXAMINATION:   General:  This is a well appearing female with appropriate speech, affect and gait.     Breast:  Incision clean, dry, and intact      IMPRESSION:   The patient has had an uneventful postoperative course.    PLAN:   1. return in December for follow up office visit and breast exam  2. Right mammogram due in December.   3. The patient is advised in continued exam of the breast chest wall and to report to this office sooner should she note any areas of abnormality or concern.   4.  Still awaiting [facility] consultation regarding surgical pathology  5. Discussed recs for chemo via med onc and consideration of radiation.  Declined  6. Will at least meet with Dr. Reynoso  7. Nutrition

## 2024-03-08 NOTE — TELEPHONE ENCOUNTER
Called Ms. Cardenas to follow up on a portal message sent to her recently regarding an [facility] research study she qualifies to participate in. Discussed study. She expressed that she is not interested in participating at this time.

## 2024-03-12 ENCOUNTER — OFFICE VISIT (OUTPATIENT)
Dept: PLASTIC SURGERY | Facility: CLINIC | Age: 34
End: 2024-03-12
Payer: COMMERCIAL

## 2024-03-12 VITALS
OXYGEN SATURATION: 98 % | DIASTOLIC BLOOD PRESSURE: 76 MMHG | WEIGHT: 186.06 LBS | RESPIRATION RATE: 19 BRPM | HEIGHT: 61 IN | SYSTOLIC BLOOD PRESSURE: 113 MMHG | BODY MASS INDEX: 35.13 KG/M2 | HEART RATE: 82 BPM

## 2024-03-12 DIAGNOSIS — Z09 SURGERY FOLLOW-UP: Primary | ICD-10-CM

## 2024-03-12 PROCEDURE — 99999 PR PBB SHADOW E&M-EST. PATIENT-LVL IV: CPT | Mod: PBBFAC,,, | Performed by: SURGERY

## 2024-03-12 PROCEDURE — 99024 POSTOP FOLLOW-UP VISIT: CPT | Mod: S$GLB,,, | Performed by: SURGERY

## 2024-03-12 NOTE — PROGRESS NOTES
Alisha Cardenas femalepresents to Plastic Surgery Clinic for a follow up visit status post left mastectomy with tissue expander placement on 2/21/24. Here today for expansion and drain check. No complaints.    PHYSICAL EXAMINATION  Left breast incision(s) well approximated with no issues.   Nipple(s) is/are well perfused   Drains are in place with low output       The fill ports on the right and left tissue expander was marked using the magnet and a pen.  The skin was then prepped using chloroprep.  Using sterile technique the right port was accessed with a 21G butterfly needle. Sterile injectable saline was added to the expander.  The skin was checked for tension and cap refill.  The same was done on the contralateral breast    Left volume added: 180 cc                   Left total volume: 540 cc                              ASSESSMENT/PLAN  Alisha Cardenas is s/p left NSM with TE  - Expanded today, patient tolerated well  - JAIRO drain removed, counseled on seroma precautions  - Will start looking for LETY flap surgery date  - Follow up 1 week preop or sooner for any interim concerns      All questions were answered. The patient was advised to contact the clinic with any questions or concerns prior to their next visit.       Nissa Adhikari PA-C  Plastic and Reconstructive Surgery

## 2024-03-13 ENCOUNTER — TELEPHONE (OUTPATIENT)
Dept: PLASTIC SURGERY | Facility: CLINIC | Age: 34
End: 2024-03-13
Payer: COMMERCIAL

## 2024-03-13 ENCOUNTER — PATIENT MESSAGE (OUTPATIENT)
Dept: PLASTIC SURGERY | Facility: CLINIC | Age: 34
End: 2024-03-13
Payer: COMMERCIAL

## 2024-03-13 DIAGNOSIS — C50.212 CARCINOMA OF UPPER-INNER QUADRANT OF LEFT BREAST IN FEMALE, ESTROGEN RECEPTOR NEGATIVE: Primary | ICD-10-CM

## 2024-03-13 DIAGNOSIS — Z17.1 CARCINOMA OF UPPER-INNER QUADRANT OF LEFT BREAST IN FEMALE, ESTROGEN RECEPTOR NEGATIVE: Primary | ICD-10-CM

## 2024-03-13 NOTE — TELEPHONE ENCOUNTER
Spoke to pt and confirmed  a surgery date 4/1/24 at the [facility] / [address] [place] [address] -[facility] Same day  Surgery center   Pt agreeable. Provided patient with details of pre /post op appts, basic prep for sx, arrival time the day before, appt w anesthesia dept,at [facility]  and address of the locations   call back # to RN navigator given should any questions or concerns arise  All questions and concerns addressed. Pt voiced understanding.

## 2024-03-14 ENCOUNTER — OFFICE VISIT (OUTPATIENT)
Dept: HEMATOLOGY/ONCOLOGY | Facility: CLINIC | Age: 34
End: 2024-03-14
Payer: COMMERCIAL

## 2024-03-14 ENCOUNTER — TELEPHONE (OUTPATIENT)
Dept: HEMATOLOGY/ONCOLOGY | Facility: CLINIC | Age: 34
End: 2024-03-14
Payer: COMMERCIAL

## 2024-03-14 VITALS
HEART RATE: 77 BPM | SYSTOLIC BLOOD PRESSURE: 103 MMHG | DIASTOLIC BLOOD PRESSURE: 75 MMHG | OXYGEN SATURATION: 96 % | BODY MASS INDEX: 34.07 KG/M2 | WEIGHT: 180.44 LBS | HEIGHT: 61 IN | RESPIRATION RATE: 18 BRPM

## 2024-03-14 DIAGNOSIS — Z17.1 CARCINOMA OF UPPER-INNER QUADRANT OF LEFT BREAST IN FEMALE, ESTROGEN RECEPTOR NEGATIVE: Primary | ICD-10-CM

## 2024-03-14 DIAGNOSIS — C50.212 CARCINOMA OF UPPER-INNER QUADRANT OF LEFT BREAST IN FEMALE, ESTROGEN RECEPTOR NEGATIVE: Primary | ICD-10-CM

## 2024-03-14 PROCEDURE — 99999 PR PBB SHADOW E&M-EST. PATIENT-LVL III: CPT | Mod: PBBFAC,,, | Performed by: STUDENT IN AN ORGANIZED HEALTH CARE EDUCATION/TRAINING PROGRAM

## 2024-03-14 PROCEDURE — 99215 OFFICE O/P EST HI 40 MIN: CPT | Mod: S$GLB,,, | Performed by: STUDENT IN AN ORGANIZED HEALTH CARE EDUCATION/TRAINING PROGRAM

## 2024-03-14 NOTE — TELEPHONE ENCOUNTER
Spoke w/ pt to schedule nutrition and integrative referral per Dr. Ivy Posada referrals. Pt approved to schedule both virtually. Integrative is scheduled w/ Joanna Dubinsky, PA-C for Monday 3/18/ @ 9AM. MA advised pt. That this is a consultation visit whereby the provider will review pt's overall health and all services offered by [facility].  Pt acknowledged.       Nutrition appt is scheduled virtually with Júnior Lindsay RD for Wedneday 3/27/24 @ 10AM. Pt stated pt is familiar with utilizing portal for virtual appts.       [place], [place] ext [phone]

## 2024-03-14 NOTE — PROGRESS NOTES
[facility]/ [facility]      Chief Complaint: TNBC      Cancer Staging   Carcinoma of upper-inner quadrant of left breast in female, estrogen receptor negative  Staging form: Breast, AJCC 8th Edition  - Clinical stage from 2024: Stage IIB (cT2, cN0, cM0, G3, ER-, MD-, HER2-) - Signed by Vannessa Barney MD on 2024      HPI:  Alisha Cardenas is a 32yo woman who presents today for evaluation of newly diagnosed breast cancer. Her oncologic history is as follows:    -Presented w palpable L breast mass that she first noticed around [facility]. Diagnostic MMG 23 reveals a 34 x 33 x 32mm in the L breast at 10oclock. Enlarged axillary LN measuring 18 x 15 x 14mm. No concerning Rt breast findings. Subsequent biopsy showing poorly differentiated invasive carcinoma, grade 3. ER negative, MD negative, Her2 0, Ki67 >90%. LN negative for metastatic carcinoma.   -MRI breast 1/10/23 revealed left breast upper inner mass measuring 4.7 cm craniocaudal x 4.6 cm AP x 4.0 cm   - 24 Underwent total left mastectomy. 5.5cm grade 3 IDC. Closest margin 2mm on posterior aspect. 0 LN.    Today Alisha presents accompanied by her mother. Denies significant pmh. FH notable for a cousin dx with breast cancer at 34yo and aunt with breast cancer. She lives in [place] with her 21yo niece. Works in childcare.     Gyn History:   Menarche: 10yo  Menopause: N/A LMP 2023     : No  HRT: No    Interval History  Patient underwent total left mastectomy on . She has met with plastic surgery and had an expander placed. Her drain was removed. She is healing well since her surgery. She is planning on having reconstruction surgery on  with a flap.       Patient Active Problem List   Diagnosis    Acute anemia    Viral syndrome    S/P abdominal myomectomy    Carcinoma of upper-inner quadrant of left breast in female, estrogen receptor negative  "      Current Outpatient Medications   Medication Instructions    albuterol sulfate (INV ALBUTEROL) 90 mcg inhalation 2 puffs, Inhalation, As needed (PRN), 2 puffs inhaled PRN    aspirin/caffeine (BC PAIN RELIEF ORAL) Oral, As needed (PRN)    cetirizine HCl (ZYRTEC ORAL) Oral, Daily    ferrous sulfate (IRON ORAL) Oral, As needed (PRN)    gabapentin (NEURONTIN) 300 mg, Oral, 3 times daily    multivit,calc,mins/iron/folic (WOMEN'S ONE DAILY ORAL) Oral, Daily    oxyCODONE (ROXICODONE) 5 mg, Oral, Every 4 hours PRN    traMADoL (ULTRAM) 50 mg, Oral, Every 6 hours PRN       Review of Systems:   Answers submitted by the patient for this visit:  Review of Systems Questionnaire (Submitted on 3/13/2024)  appetite change : No  unexpected weight change: No  mouth sores: No  visual disturbance: No  cough: No  shortness of breath: No  chest pain: No  abdominal pain: No  diarrhea: No  frequency: Yes  back pain: No  rash: No  headaches: No  adenopathy: No  nervous/ anxious: No        PHYSICAL EXAM:  /75   Pulse 77   Resp 18   Ht 5' 1" (1.549 m)   Wt 81.8 kg (180 lb 7.1 oz)   LMP 02/21/2024   SpO2 96%   BMI 34.10 kg/m²     ECOG 0  General: well appearing, in no apparent distress  HEENT: Normocephalic, EOMI, anicteric sclerae, MMM  Neck: supple, without cervical or supraclavicular lymphadenopathy.  Heart: regular rate and rhythm, normal S1 and S2, no murmurs, gallops or rubs.  Lungs: Clear to auscultation bilaterally, no increased wob  Breast: L breast healing well post surgery. No Rt breast masses  Abdomen: Soft, nontender, nondistended with normal bowel sounds. No hepatosplenomegaly.  Extremities: No LE edema or joint effusion  Skin: warm, well-perfused, no rash  Neurologic: Alert and oriented x 4, normal speech and gait   Psychiatric: Conversing appropriately with providers throughout today's encounter.      Pertinent Labs & Imaging:  Reviewed all recent labs, imaging and pathology.     Assessment & Plan:  Alisha is a " can 32yo woman with recently diagnosed at least Stage IIB TNBC (cT2N0) who presents today for evaluation.    We had a long discussion about the biology, pathophysiology, epidemiology, and treatment of triple negative breast cancer. Given her cancer that is at least T2N0, with high Ki67, and that she is otherwise young and healthy, I recommended initiating carboplatin/paclitaxel/pembrolizumab followed by adriamycin/cytoxan/pembrolizumab (KEYNOTE 522 regimen).     Alisha declined upfront chemotherapy and opted to proceed with surgery first. Today she is s/p L mastectomy. We reviewed her final pathology demonstrating a 5.5cm IDC. 0/17LN. Today we reviewed the recommendation for adjuvant treatment with AC-T followed by radiation (tumor was >5cm) and then reconstruction. Patient was adamant about getting her reconstruction on 4/1 and said she is not interested in any other treatment if that will interfere with her surgery. Explained our concerns of delaying chemo and/or radiation which can increase the risk of recurrence/overall survival as well as interfere with healing from her surgery. Patient was still not interested and wants to get the reconstruction surgery. She is ok with meeting with radiation oncology but knows that it is unlikely she will be unable to get radiation after her reconstruction. She would like to get the reconstruction surgery first then discuss chemotherapy afterwards but at this time does not want any systemic therapy. She is also aware that we would no longer offer chemotherapy if it has been too long since her mastectomy (ideally want to give chemo 4-6 weeks after surgery) since there would be no benefit anymore.     #TNBC:   --Plan for flap reconstruction on 4/1   --Patient agreeable to meet with radiation oncology   --Will speak with plastic surgery about possibility and timing of chemo/radiation after reconstruction surgery   --Will follow-up with patient after surgery to see if she  changed her mind about adjuvant chemotherapy   --Will need yearly mammograms on the right breast     Ronaldo Mobley MD  Hematology/Oncology Fellow PGY-IV      Reviewed patients referring notes, imaging and pathology. Discussed diagnosis, staging, and treatment in detail with patient. Will see her back in 2mo or sooner should the need arise.     Route Chart for Scheduling    Med Onc Chart Routing      Follow up with physician 2 months.   Follow up with [facility]    Infusion scheduling note    Injection scheduling note    Labs None   Scheduling:  Preferred lab:  Lab interval:     Imaging None      Pharmacy appointment No pharmacy appointment needed      Other referrals no referral to Oncology Primary Care needed -  no Massage appointment needed    Additional referrals needed  needs follow up visit with [facility] set up asap please                    I personally interviewed and examined this patient today with Dr. Finch and agree with the assessment and plan set forth in his note. Today we reviewed Ms. Cardenas's final pathology and the risk of recurrence associated with TNBC. I explained that adjuvant chemotherapy with ddAC-T followed by radiation was recommended to reduce recurrence risk as above. This would ideally be done prior to reconstruction to not complicate wound healing or compromise reconstruction. Alisha expressed understanding of the associated risk with not pursuing chemotherapy and would instead like to pursue breast reconstruction as planned. She understands that we would not offer chemotherapy after she heals from her reconstruction surgery as the benefit of giving chemotherapy greater than 8 weeks out from her initial surgery is unknown. Will have her follow up with [facility] to discuss ideal timing of radiation, although at this time she states that she does not wish to pursue this prior to reconstruction. Will plan to see her back in 2 months for close monitoring.     Vannessa Barney MD

## 2024-03-15 ENCOUNTER — PATIENT MESSAGE (OUTPATIENT)
Dept: PLASTIC SURGERY | Facility: CLINIC | Age: 34
End: 2024-03-15
Payer: COMMERCIAL

## 2024-03-15 ENCOUNTER — TELEPHONE (OUTPATIENT)
Dept: PLASTIC SURGERY | Facility: CLINIC | Age: 34
End: 2024-03-15
Payer: COMMERCIAL

## 2024-03-15 NOTE — TELEPHONE ENCOUNTER
Called patient in response to [employer] message regarding fluid around her expander. Explained presentation and management of seromas. Okay to wait until Tuesday clinic to see her and aspirate fluid if present. Reviewed signs of infection that would warrant being seen sooner/ED precautions. Patient verbalized understanding.

## 2024-03-17 ENCOUNTER — PATIENT MESSAGE (OUTPATIENT)
Dept: PLASTIC SURGERY | Facility: CLINIC | Age: 34
End: 2024-03-17
Payer: COMMERCIAL

## 2024-03-17 ENCOUNTER — PATIENT MESSAGE (OUTPATIENT)
Dept: RADIOLOGY | Facility: HOSPITAL | Age: 34
End: 2024-03-17
Payer: COMMERCIAL

## 2024-03-18 ENCOUNTER — OFFICE VISIT (OUTPATIENT)
Dept: HEMATOLOGY/ONCOLOGY | Facility: CLINIC | Age: 34
End: 2024-03-18
Payer: COMMERCIAL

## 2024-03-18 ENCOUNTER — PATIENT MESSAGE (OUTPATIENT)
Dept: HEMATOLOGY/ONCOLOGY | Facility: CLINIC | Age: 34
End: 2024-03-18

## 2024-03-18 DIAGNOSIS — Z17.1 CARCINOMA OF UPPER-INNER QUADRANT OF LEFT BREAST IN FEMALE, ESTROGEN RECEPTOR NEGATIVE: ICD-10-CM

## 2024-03-18 DIAGNOSIS — C50.212 CARCINOMA OF UPPER-INNER QUADRANT OF LEFT BREAST IN FEMALE, ESTROGEN RECEPTOR NEGATIVE: ICD-10-CM

## 2024-03-18 PROCEDURE — 99215 OFFICE O/P EST HI 40 MIN: CPT | Mod: 95,,, | Performed by: PHYSICIAN ASSISTANT

## 2024-03-18 NOTE — PROGRESS NOTES
The patient location is: [facility]    Visit type: audiovisual    Face to Face time with patient: 30  45 minutes of total time spent on the encounter, which includes face to face time and non-face to face time preparing to see the patient (eg, review of tests), Obtaining and/or reviewing separately obtained history, Documenting clinical information in the electronic or other health record, Independently interpreting results (not separately reported) and communicating results to the patient/family/caregiver, or Care coordination (not separately reported).         Each patient to whom he or she provides medical services by telemedicine is:  (1) informed of the relationship between the physician and patient and the respective role of any other health care provider with respect to management of the patient; and (2) notified that he or she may decline to receive medical services by telemedicine and may withdraw from such care at any time.    Notes:        [facility] Initial Visit      Chief Complaint:  I want to promote my overall well-being through cancer.    HPI: Alisha Cardenas is a 32 y/o FEMALE with history of breast cancer referred to Integrative Oncology by Dr. Barney.  Patient had mastectomy and has upcoming plastic reconstructive surgery.  Patient was advised, for standard of care, to get chemo and also radiation.  Patient declined these treatments.  She states that she knew a family member with Stage 4 breast cancer who suffered through chemo.  Overall patient is feeling well, sleeping well though with some pain, feels well rested.  Patient is not exercising since she was advised not to after her surgery.  She was not exercising before that either, states she was never in a regular habit with it.  She is interested in doing some sort of activity at the cancer center after she heals from her surgery; she is interested in OT/yoga.   She is also interested in a Nutrition consult.  She  stopped eating meat after her diagnosis and started juicing.  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   She spends time with family, especially her mother and nieces and she also goes to [employer]; these are the things that bring her melissa in life.  She is interested in meditation.    Cancer/Stage/TNM:   Cancer Staging   Carcinoma of upper-inner quadrant of left breast in female, estrogen receptor negative  Staging form: Breast, AJCC 8th Edition  - Clinical stage from 2024: Stage IIB (cT2, cN0, cM0, G3, ER-, FL-, HER2-) - Signed by Vannessa Barney MD on 2024       Oncology History   Carcinoma of upper-inner quadrant of left breast in female, estrogen receptor negative   2024 Initial Diagnosis    Carcinoma of upper-inner quadrant of left breast in female, estrogen receptor negative     2024 Cancer Staged    Staging form: Breast, AJCC 8th Edition  - Clinical stage from 2024: Stage IIB (cT2, cN0, cM0, G3, ER-, FL-, HER2-)           Past Medical History:   Diagnosis Date    Asthma     reports as a child    Astigmatism of left eye     Breast cancer     Left    Complete spontaneous  2014 9:22:39 AM    [facility]: , spontaneous, complete-No Additional Notes    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 2014 9:22:57 AM    [facility] - [facility]: , habitual w/o pregnancy-No Additional Notes    Missed  2014 9:22:49 AM    [facility]: , Missed, Less than 22 Weeks-No Additional Notes    Prior miscarriage with pregnancy in first trimester, antepartum     Prior miscarriage with pregnancy in first trimester, antepartum     Prior miscarriage with pregnancy in second trimester, antepartum         Current Outpatient Medications   Medication Instructions    albuterol sulfate (INV ALBUTEROL) 90 mcg inhalation 2 puffs,  Inhalation, As needed (PRN), 2 puffs inhaled PRN    aspirin/caffeine (BC PAIN RELIEF ORAL) Oral, As needed (PRN)    cetirizine HCl (ZYRTEC ORAL) Oral, Daily    ferrous sulfate (IRON ORAL) Oral, As needed (PRN)    gabapentin (NEURONTIN) 300 mg, Oral, 3 times daily    multivit,calc,mins/iron/folic (WOMEN'S ONE DAILY ORAL) Oral, Daily    oxyCODONE (ROXICODONE) 5 mg, Oral, Every 4 hours PRN    traMADoL (ULTRAM) 50 mg, Oral, Every 6 hours PRN        Past Surgical History:   Procedure Laterality Date    DILATION AND CURETTAGE OF UTERUS      INJECTION FOR SENTINEL NODE IDENTIFICATION Left 2/21/2024    Procedure: INJECTION, FOR SENTINEL NODE IDENTIFICATION;  Surgeon: Ivy Posada MD;  Location: [facility];  Service: General;  Laterality: Left;    INSERTION OF BREAST TISSUE EXPANDER Left 2/21/2024    Procedure: INSERTION, TISSUE EXPANDER, BREAST /  UNLATERAL LEFT TISSUE EXPANDER;  Surgeon: Mehran Street DO;  Location: [facility];  Service: General;  Laterality: Left;  2 HORS    MASTECTOMY Left 2/21/2024    Procedure: MASTECTOMY;  Surgeon: Ivy Posada MD;  Location: [facility];  Service: General;  Laterality: Left;  1.5 HRS    MASTECTOMY WITH SENTINEL NODE BIOPSY AND AXILLARY LYMPH NODE DISSECTION Left 2/21/2024    Procedure: MASTECTOMY, WITH SENTINEL NODE BIOPSY AND AXILLARY LYMPHADENECTOMY;  Surgeon: Ivy Posada MD;  Location: [facility];  Service: General;  Laterality: Left;    MYOMECTOMY N/A 8/5/2022    Procedure: MYOMECTOMY;  Surgeon: Renzo Montoya III, MD;  Location: [facility];  Service: OB/GYN;  Laterality: N/A;    none      SENTINEL LYMPH NODE BIOPSY Left 2/21/2024    Procedure: BIOPSY, LYMPH NODE, SENTINEL;  Surgeon: Ivy Posada MD;  Location: [facility];  Service: General;  Laterality: Left;    TONSILLECTOMY                7 Pillars Assessment      Sleep  How many hours of sleep per night? 5-6 hours  Do you have trouble falling asleep, staying asleep or waking up earlier than you need to? no  Do you have  daytime fatigue? no  Do you need medication for sleep? no  Do you use any supplements or other interventions for sleep? no    Resilience  Rate your current level of stress- mod  How do you manage stress?  Time with family    Purpose  Do you feel you have a vision or a life purpose? Yes    Environment  Any exposures:no known exposures    [facility]    Nutrition   Food allergies or sensitivities: no  Do you adhere to a particular type of diet? no  What type of diet do you follow? Not eating meat  Do you have any concerns with your eating habits? yes  Are you concerned with your level of alcohol intake? no    Exercise  How would you describe your physical activity level? low  Do you work at a sedentary job? 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 housework      Physical Exam   LMP 02/21/2024    Wt Readings from Last 3 Encounters:   03/14/24 81.8 kg (180 lb 7.1 oz)   03/12/24 84.4 kg (186 lb 1.1 oz)   03/07/24 84.4 kg (186 lb)     Temp Readings from Last 3 Encounters:   02/22/24 97.9 °F (36.6 °C) (Oral)   01/11/24 98.4 °F (36.9 °C) (Oral)   01/11/24 98.4 °F (36.9 °C) (Oral)     BP Readings from Last 3 Encounters:   03/14/24 103/75   03/12/24 113/76   03/07/24 113/76     Pulse Readings from Last 3 Encounters:   03/14/24 77   03/12/24 82   03/07/24 81       Body mass index is There is no height or weight on file to calculate BMI.    Vitals reviewed.   Constitutional:       General: Patient is not in acute distress.     Appearance: Normal appearance.   HENT:      Head: Normocephalic and atraumatic.  Pulmonary:      Effort: Pulmonary effort is normal.       Review of Systems:   Cardiac:           No SOB, chest pain with exertion,edema, orthopnea  Distress:          No excessive sadness, no hopelessness, no anhedonia, no excessive worry or nervousness  Cognitive:        No trouble with memory, no difficulty paying attention, no brain fog, no trouble functioning with work or home life  Fatigue:           Energy  "level adequate, performing ADL's, no morning fatigue                           Fatigue  1  / 10  ( Scale 0 - 10)   Hormonal:       No hot flashes, no night sweats  Pain:                Has no pain,  location: surgical area of breast                          Pain 3  / 10 (Scale 0 - 10)    Neuropathy:    No numbness, no tingling, no paresthesia   Sleep:              No difficulty falling asleep, no waking up in night, no daytime sleepiness, no snoring  Altered function:          No problems with money management,  no problems with daily organization & planning  Weight:           No concerns with weight, wants to lose a little and maintain healthy        Labs:   Lab Results   Component Value Date    WBC 11.25 02/22/2024    HGB 10.0 (L) 02/22/2024    HCT 33.1 (L) 02/22/2024    MCV 78 (L) 02/22/2024     (H) 02/22/2024           No results found for: "HGBA1C"         Assessment:   Patient is a 33 y.o.female who arrived to [facility] for consult during treatment for breast cancer.       Plan   #Nutrition: Encourage plant-forward anti-inflammatory diet with plenty of protein, will refer to Nutrition  # Sleep: Recommend 6-8 hours of restful sleep nightly; recommend strong sleep hygiene routine one hour prior to bed.  # Exercise: Once cleared recommend 60 minutes of gentle movement/light activity per day (cleaning, walking, cooking, gardening, stationary bike) at baseline and after she is cleared after the next surgery, if can tolerate, build up to at least 150 minutes (2 ½ hours) of moderate-intensity exercise or 75 minutes (1 ¼ hours) of vigorous-intensity aerobic exercise (eg: brisk walking/jogging/swimming/cycling) over each week.  Two to three resistance-based (strength) exercise sessions (eg: lifting weights, therabands or using own body weight) over each week if can tolerate.  it is important to focus on activities that will help you SAFELY AND GRADUALLY BUILD STRENGTH BUT DO NOT DEPLETE YOUR ENERGY " LEVELS.  Will have patient work with OT/yoga after she heals from next surgery.  Strongly encouraged patient to develop exercise regimen as prevention of recurrence of cancer   # Discussion about chemo/radiation.  Patient is concerned about chemo because she saw a family member in stage 4 suffer through chemo.  I encouraged patient to really consider this, as her cancer is not as advanced but if it recurred without this treatment, it could be more advanced at that point.  I reviewed all the ways [facility] could help her through chemo treatment if she is concerned about side effects.  Patient states she is not interested in receiving chemo.  # Reviewed all additional resources [facility] can offer to patient including classes and will send Guided Imagery  #Follow-Up: PRN

## 2024-03-19 ENCOUNTER — TELEPHONE (OUTPATIENT)
Dept: HEMATOLOGY/ONCOLOGY | Facility: CLINIC | Age: 34
End: 2024-03-19
Payer: COMMERCIAL

## 2024-03-19 ENCOUNTER — OFFICE VISIT (OUTPATIENT)
Dept: PLASTIC SURGERY | Facility: CLINIC | Age: 34
End: 2024-03-19
Payer: COMMERCIAL

## 2024-03-19 DIAGNOSIS — Z17.1 CARCINOMA OF UPPER-INNER QUADRANT OF LEFT BREAST IN FEMALE, ESTROGEN RECEPTOR NEGATIVE: Primary | ICD-10-CM

## 2024-03-19 DIAGNOSIS — C50.212 CARCINOMA OF UPPER-INNER QUADRANT OF LEFT BREAST IN FEMALE, ESTROGEN RECEPTOR NEGATIVE: Primary | ICD-10-CM

## 2024-03-19 LAB
FINAL PATHOLOGIC DIAGNOSIS: NORMAL
FROZEN SECTION DIAGNOSIS: NORMAL
GROSS: NORMAL
Lab: NORMAL
MICROSCOPIC EXAM: NORMAL
SUPPLEMENTAL DIAGNOSIS: NORMAL

## 2024-03-19 PROCEDURE — 99999 PR PBB SHADOW E&M-EST. PATIENT-LVL II: CPT | Mod: PBBFAC,,, | Performed by: SURGERY

## 2024-03-19 PROCEDURE — 99024 POSTOP FOLLOW-UP VISIT: CPT | Mod: S$GLB,,, | Performed by: SURGERY

## 2024-03-23 NOTE — PROGRESS NOTES
Ms Cardenas is now roughly 4 weeks status post left nipple sparing mastectomy with tissue expander placement.  It was nearly filled to adequate volume but noticed fluctuant area unlikely seroma recurrence on the left side.  She met with Dr. Barney last week and it was not planning on any adjuvant chemotherapy or radiation and we would like to proceed with flap reconstruction.  She understands that she was at an increased risk of recurrence if she does not get the standard of care therapy     On exam she has a well healing vertical radial incision on her left breast.  She does have a seroma around the tissue expander    The fill ports on the left tissue expander was marked using the magnet and a pen.  The skin was then prepped using chloroprep.  Using sterile technique the left port was accessed with a 21G butterfly needle.Sterile injectable saline was added to the expander.  The skin was checked for tension and cap refill.    She also had a seroma.  This was aspirated from over the fill port.  A total of 80 cc was removed before additional volume was replaced.    left volume added:  120 cc                 left total volume:  660 cc       She was not plan on any adjuvant therapy.  We are currently planning her couple weeks.  She has an allergy to IV contrast and has a tissue expander so she can not get an MRA.  Discussed that we will plan for  identification with ultrasound on the date of her surgery.    We went ahead and did her preop education for her LETY flap  Explained the importance of postoperative hospitalization for monitoring of the new vessel anastomosis, which is most important in the 48 hours after surgery.  Discussed flap monitoring, risks of take back/flap failure/ expected hospital course, use of drains, and recovery.  Risks including flap loss, fat necrosis, infection, wound breakdown, seroma, hematoma, scarring, need for reoperation, blood clots were all covered.  Typical revision procedures  including lifting the breast, tailoring the skin, liposuction and fat grafting for contour were also discussed.  Consents signed    Reviewed multimodal pain control regimen used in the post operative period. Prescriptions will be sent to the outpatient pharmacy the day of surgery.  The patient was given a packet including general instructions for post-operative care, instructions for the day of surgery, drain care and process to complete FMLA/Disability paperwork.  All questions were answered. The patient was given a business card with contact info and advised to contact the clinic with any questions or concerns before or after surgery.    Keyshawn Street, DO  Plastic and Reconstructive Surgery

## 2024-03-25 NOTE — PROGRESS NOTES
The patient location is: [place]  The chief complaint leading to consultation is: newly dx breast cancer     Visit type: audiovisual    Face to Face time with patient: 60  90 minutes of total time spent on the encounter, which includes face to face time and non-face to face time preparing to see the patient (eg, review of tests), Obtaining and/or reviewing separately obtained history, Documenting clinical information in the electronic or other health record, Independently interpreting results (not separately reported) and communicating results to the patient/family/caregiver, or Care coordination (not separately reported).     Each patient to whom he or she provides medical services by telemedicine is:  (1) informed of the relationship between the physician and patient and the respective role of any other health care provider with respect to management of the patient; and (2) notified that he or she may decline to receive medical services by telemedicine and may withdraw from such care at any time.    Notes:    Oncology Nutrition Assessment Medical Nutrition Therapy Initial Visit    Alisha Chambers Ronald  1990    Referring Provider: Ivy Posada MD     Diagnosis: Carcinoma of upper-inner quadrant of left breast in female, estrogen receptor negative   Treatment: s/p total left mastectomy (24). She has met with plastic surgery and had an expander placed. Reconstruction surgery planned for 24 with a flap. Declined upfront chemotherapy per Dr. Barney note.    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 female with Carcinoma of upper-inner quadrant of left breast in female, estrogen receptor negative referred by Dr. Posada for nutrition assessment prior to reconstruction. Noted that Ms. Cardenas has made changes to her diet and lifestyle since diagnosis. Of note, she has discontinued intake of all meat except for fish (primarily salmon) and milk/cheese. Started cooking meals and reading ingredient labels. Discontinued intake of fast food. Gathering information health information tips and recipe ideas online and via social media. Requesting dietitian assistance on fact or fiction of some topics.    Anthropometrics:   Weight:   Wt Readings from Last 3 Encounters:   24 81.8 kg (180 lb 7.1 oz)   24 84.4 kg (186 lb 1.1 oz)   24 84.4 kg (186 lb)                                    Height: 61 inches  BMI: 34.1  BSA: 1.88 Usual BW: N/A. Timeframe: N/A  Weight Change: N/A              Appetite/Intake: No meat except fish and avoiding dairy. Eating 3 meals daily. Enzyme bread.   Breakfast: Oat milk, frozen fruits, and 1-2 Tbsp pete seeds (6 gm of protein).   Lunch: fruits/vegetables/peanut butter sandwiches  Dinner: [place] + Broccoli/Cauliflower  Snack: [employer] Mills   Beverage: 32 oz water + 16 oz Kombucha juice + Soursop juice 16 oz; Occasional intake of Organic Wine Jil [employer]    Current Medications:    Current Outpatient Medications:     albuterol sulfate (INV ALBUTEROL) 90 mcg inhalation, Inhale 2 puffs into the lungs as needed (PRN wheeze). 2 puffs inhaled PRN, Disp: , Rfl:      aspirin/caffeine (BC PAIN RELIEF ORAL), Take by mouth as needed., Disp: , Rfl:     cetirizine HCl (ZYRTEC ORAL), Take by mouth once daily at 6am., Disp: , Rfl:     ferrous sulfate (IRON ORAL), Take by mouth as needed., Disp: , Rfl:     gabapentin (NEURONTIN) 300 MG capsule, Take 1 capsule (300 mg total) by mouth 3 (three) times daily. for 7 days, Disp: 21 capsule, Rfl: 0    multivit,calc,mins/iron/folic (WOMEN'S ONE DAILY ORAL), Take by mouth once daily., Disp: , Rfl:     oxyCODONE (ROXICODONE) 5 MG immediate release tablet, Take 1 tablet (5 mg total) by mouth every 4 (four) hours as needed for Pain., Disp: 10 tablet, Rfl: 0    traMADoL (ULTRAM) 50 mg tablet, Take 1 tablet (50 mg total) by mouth every 6 (six) hours as needed for Pain., Disp: 15 tablet, Rfl: 0    Labs: Reviewed from 1/12/24     Nutrition Diagnosis    Nutrition Problem: Food and nutrition related knowledge deficit  Etiology (related to): lack of prior need for nutrition education   Signs/Symptoms (as evidenced by): new cancer diagnosis    Nutrition Intervention    Nutrition Prescription  2050 kcals/day  25 kcal/kg    82-98 g protein/day 1-1.2 gm/kg  2050 mL fluid/day 1 ml/kcal    Recommendations:   Encouraged increased protein intake. Discussed protein source at each meal  Review MyPlate and goal of Protein/Vegetable/Fiber-Filled Grain at each meal  Discussed Coquelux or JRKICKZ for added protein. Request for Coquelux samples sent to patient home with her permission     Materials Provided/Reviewed: Patient requested information on Foods for Specific Blood Type B+ and research about products to avoid with cancer. Message sent to colleagues to get additional information on these topics.    Nutrition Monitoring and Evaluation    Monitor: diet education needs , symptom management , and adherence to nutrition recommendations     Follow up In 1.5 months; post reconstruction surgery    Communication to referring provider/care team: Note available in  chart.     Consultation Time: 60 Minutes    Júnior ADAMS, , LDN  Advanced Practice in Clinical Nutrition  Board Certified Specialist in Oncology Nutrition   [facility] 3rd [facility]  [phone]

## 2024-03-26 ENCOUNTER — OFFICE VISIT (OUTPATIENT)
Dept: PLASTIC SURGERY | Facility: CLINIC | Age: 34
End: 2024-03-26
Payer: COMMERCIAL

## 2024-03-26 VITALS
SYSTOLIC BLOOD PRESSURE: 103 MMHG | BODY MASS INDEX: 34.07 KG/M2 | DIASTOLIC BLOOD PRESSURE: 75 MMHG | WEIGHT: 180.31 LBS

## 2024-03-26 DIAGNOSIS — Z09 SURGERY FOLLOW-UP: Primary | ICD-10-CM

## 2024-03-26 PROCEDURE — 99024 POSTOP FOLLOW-UP VISIT: CPT | Mod: S$GLB,,, | Performed by: SURGERY

## 2024-03-26 PROCEDURE — 99999 PR PBB SHADOW E&M-EST. PATIENT-LVL III: CPT | Mod: PBBFAC,,, | Performed by: SURGERY

## 2024-03-26 NOTE — PROGRESS NOTES
Alisha Cardenas femalepresents to Plastic Surgery Clinic for a follow up visit status post left NSM on 2/21/24. She's had a recurrent seroma requiring aspiration in the clinic. She's scheduled for LETY flap reconstruction next week. Here today for seroma, which is uncomfortable for her.       PHYSICAL EXAMINATION  Left breast incision(s) well healed with no issues. Left tissue expander in place with fluid wave around expander.  Nipple(s) is/are well perfused   Drains are not present       ASSESSMENT/PLAN  Alisha Cardenas is s/p left NSM with TE  - Aspirated 100ccs serous fluid over left expander fill port  - Follow up post op from LETY flap      All questions were answered. The patient was advised to contact the clinic with any questions or concerns prior to their next visit.       Nissa Adhikari PA-C  Plastic and Reconstructive Surgery

## 2024-03-27 ENCOUNTER — CLINICAL SUPPORT (OUTPATIENT)
Dept: HEMATOLOGY/ONCOLOGY | Facility: CLINIC | Age: 34
End: 2024-03-27
Payer: COMMERCIAL

## 2024-03-27 ENCOUNTER — PATIENT MESSAGE (OUTPATIENT)
Dept: HEMATOLOGY/ONCOLOGY | Facility: CLINIC | Age: 34
End: 2024-03-27
Payer: COMMERCIAL

## 2024-03-27 DIAGNOSIS — Z17.1 CARCINOMA OF UPPER-INNER QUADRANT OF LEFT BREAST IN FEMALE, ESTROGEN RECEPTOR NEGATIVE: ICD-10-CM

## 2024-03-27 DIAGNOSIS — C50.212 CARCINOMA OF UPPER-INNER QUADRANT OF LEFT BREAST IN FEMALE, ESTROGEN RECEPTOR NEGATIVE: ICD-10-CM

## 2024-03-27 DIAGNOSIS — Z71.3 NUTRITIONAL COUNSELING: Primary | ICD-10-CM

## 2024-03-28 ENCOUNTER — ANESTHESIA EVENT (OUTPATIENT)
Dept: SURGERY | Facility: OTHER | Age: 34
DRG: 585 | End: 2024-03-28
Payer: COMMERCIAL

## 2024-03-28 ENCOUNTER — TELEPHONE (OUTPATIENT)
Dept: SURGERY | Facility: CLINIC | Age: 34
End: 2024-03-28
Payer: COMMERCIAL

## 2024-03-28 ENCOUNTER — TELEPHONE (OUTPATIENT)
Dept: PLASTIC SURGERY | Facility: CLINIC | Age: 34
End: 2024-03-28
Payer: COMMERCIAL

## 2024-03-28 NOTE — TELEPHONE ENCOUNTER
Pre op call complete. Advised pt of arrival time for surgery, pt advised to arrive to [facility], 1st floor, [facility], Same Day Surgery, at 0500 for 0700 surgery, No solids after 9 pm and clear liquids reinforced- Gatorade /powerade or water reinforced until leave home- ride and care giver arranged and verified. Pre op education reinforced. Pt Verbalized understanding, all questions and concerns addressed at this time.

## 2024-03-28 NOTE — PRE ADMISSION SCREENING
Information to Prepare you for your Surgery    PRE-ADMIT TESTING -  [phone]    [address]  [facility]          Your surgery has been scheduled at [facility]. We are pleased to have the opportunity to serve you. For Further Information please call [phone].    On the day of surgery please report to the Information Desk on the 1st floor.    CONTACT YOUR PHYSICIAN'S OFFICE THE DAY PRIOR TO YOUR SURGERY TO OBTAIN YOUR ARRIVAL TIME.     The evening before surgery do not eat anything after 9 p.m. ( this includes hard candy, chewing gum and mints).  You may only have GATORADE, POWERADE AND WATER  from 9 p.m. until you leave your home.   DO NOT DRINK ANY LIQUIDS ON THE WAY TO THE HOSPITAL.      Why does your anesthesiologist allow you to drink Gatorade/Powerade before surgery?  Gatorade/Powerade helps to increase your comfort before surgery and to decrease your nausea after surgery. The carbohydrates in Gatorade/Powerade help reduce your body's stress response to surgery.  If you are a diabetic-drink only water prior to surgery.    Outpatient Surgery- May allow 2 adult (18 and older) Support Persons (1 being the designated ) for all surgical/procedural patients. A breastfeeding mother will be allowed her infant and 2 adult Support Persons. No one under the age of 18 will be allowed in the building. No swapping out of visitors in the [facility].      SPECIAL MEDICATION INSTRUCTIONS: TAKE medications checked off by the Anesthesiologist on your Medication List.    Angiogram Patients: Take medications as instructed by your physician, including aspirin.     Surgery Patients:    If you take ASPIRIN - Your PHYSICIAN/SURGEON will need to inform you IF/OR when you need to stop taking aspirin prior to your surgery.     The week prior to surgery do not ot take any medications containing IBUPROFEN or NSAIDS ( Advil, Motrin, Goodys, BC, Aleve, Naproxen etc)  If you are not sure if you should take a medicine please call your surgeon's office.  Ok to take Tylenol    Do Not Wear any make-up (especially eye make-up) to surgery. Please remove any false eyelashes or eyelash extensions. If you arrive the day of surgery with makeup/eyelashes on you will be required to remove prior to surgery. (There is a risk of corneal abrasions if eye makeup/eyelash extensions are not removed)      Leave all valuables at home.   Do Not wear any jewelry or watches, including any metal in body piercings. Jewelry must be removed prior to coming to the hospital.  There is a possibility that rings that are unable to be removed may be cut off if they are on the surgical extremity.    Please remove all hair extensions, wigs, clips and any other metal accessories/ ornaments from your hair.  These items may pose a flammable/fire risk in Surgery and must be removed.    Do not shave your surgical area at least 5 days prior to your surgery. The surgical prep will be performed at the hospital according to Infection Control regulations.    Contact Lens must be removed before surgery. Either do not wear the contact lens or bring a case and solution for storage.  Please bring a container for eyeglasses or dentures as required.  Bring any paperwork your physician has provided, such as consent forms,  history and physicals, doctor's orders, etc.   Bring comfortable clothes that are loose fitting to wear upon discharge. Take into consideration the type of surgery being performed.  Maintain your diet as advised per your physician the day prior to surgery.      Adequate rest the night before surgery is advised.   Park in the Parking lot behind the hospital or in the [facility] across the street from the parking lot. Parking is complimentary.  If you will be discharged the same day as your procedure, please arrange for a responsible adult to drive you home or to accompany you if traveling by taxi.    YOU WILL NOT BE PERMITTED TO DRIVE OR TO LEAVE THE HOSPITAL ALONE AFTER SURGERY.   If you are being discharged the same day, it is strongly recommended that you arrange for someone to remain with you for the first 24 hrs following your surgery.    The Surgeon will speak to your family/visitor after your surgery regarding the outcome of your surgery and post op care.  The Surgeon may speak to you after your surgery, but there is a possibility you may not remember the details.  Please check with your family members regarding the conversation with the Surgeon.    We strongly recommend whoever is bringing you home be present for discharge instructions.  This will ensure a thorough understanding for your post op home care.          Thank you for your cooperation.  The Staff of [facility].            Bathing Instructions    Shower the evening before and morning of your procedure with ANTIBACTERIAL SOAP, RINSE. Dry off as usual, do not use any deodorant, powder, body lotions, perfume, after shave or cologne.                                     Information to Prepare you for your Surgery    PRE-ADMIT TESTING -  [phone]    [address] [facility]          Your surgery has been scheduled at [facility]. We are pleased to have the opportunity to serve you. For Further Information please call [phone].    On the day of surgery please report to the Information Desk on the 1st floor.    CONTACT YOUR PHYSICIAN'S OFFICE THE DAY PRIOR TO YOUR SURGERY TO OBTAIN YOUR ARRIVAL TIME.     The evening before surgery do not eat anything after 9 p.m. ( this includes hard candy, chewing gum and mints).  You may only have GATORADE, POWERADE AND WATER  from 9 p.m. until you leave your home.   DO NOT DRINK ANY LIQUIDS ON THE WAY TO THE HOSPITAL.      Why does your anesthesiologist allow you to drink Gatorade/Powerade before surgery?  Gatorade/Powerade helps to increase your comfort  before surgery and to decrease your nausea after surgery. The carbohydrates in Gatorade/Powerade help reduce your body's stress response to surgery.  If you are a diabetic-drink only water prior to surgery.    Outpatient Surgery- May allow 2 adult (18 and older) Support Persons (1 being the designated ) for all surgical/procedural patients. A breastfeeding mother will be allowed her infant and 2 adult Support Persons. No one under the age of 18 will be allowed in the building. No swapping out of visitors in the [facility] building.      SPECIAL MEDICATION INSTRUCTIONS: TAKE medications checked off by the Anesthesiologist on your Medication List.    Angiogram Patients: Take medications as instructed by your physician, including aspirin.     Surgery Patients:    If you take ASPIRIN - Your PHYSICIAN/SURGEON will need to inform you IF/OR when you need to stop taking aspirin prior to your surgery.     The week prior to surgery do not ot take any medications containing IBUPROFEN or NSAIDS ( Advil, Motrin, Goodys, BC, Aleve, Naproxen etc) If you are not sure if you should take a medicine please call your surgeon's office.  Ok to take Tylenol    Do Not Wear any make-up (especially eye make-up) to surgery. Please remove any false eyelashes or eyelash extensions. If you arrive the day of surgery with makeup/eyelashes on you will be required to remove prior to surgery. (There is a risk of corneal abrasions if eye makeup/eyelash extensions are not removed)      Leave all valuables at home.   Do Not wear any jewelry or watches, including any metal in body piercings. Jewelry must be removed prior to coming to the hospital.  There is a possibility that rings that are unable to be removed may be cut off if they are on the surgical extremity.    Please remove all hair extensions, wigs, clips and any other metal accessories/ ornaments from your hair.  These items may pose a flammable/fire risk in Surgery and must be  removed.    Do not shave your surgical area at least 5 days prior to your surgery. The surgical prep will be performed at the hospital according to Infection Control regulations.    Contact Lens must be removed before surgery. Either do not wear the contact lens or bring a case and solution for storage.  Please bring a container for eyeglasses or dentures as required.  Bring any paperwork your physician has provided, such as consent forms,  history and physicals, doctor's orders, etc.   Bring comfortable clothes that are loose fitting to wear upon discharge. Take into consideration the type of surgery being performed.  Maintain your diet as advised per your physician the day prior to surgery.      Adequate rest the night before surgery is advised.   Park in the Parking lot behind the hospital or in the [employer] [facility] across the street from the parking lot. Parking is complimentary.  If you will be discharged the same day as your procedure, please arrange for a responsible adult to drive you home or to accompany you if traveling by taxi.   YOU WILL NOT BE PERMITTED TO DRIVE OR TO LEAVE THE HOSPITAL ALONE AFTER SURGERY.   If you are being discharged the same day, it is strongly recommended that you arrange for someone to remain with you for the first 24 hrs following your surgery.    The Surgeon will speak to your family/visitor after your surgery regarding the outcome of your surgery and post op care.  The Surgeon may speak to you after your surgery, but there is a possibility you may not remember the details.  Please check with your family members regarding the conversation with the Surgeon.    We strongly recommend whoever is bringing you home be present for discharge instructions.  This will ensure a thorough understanding for your post op home care.          Thank you for your cooperation.  The Staff of [facility].            Bathing Instructions with Hibiclens    Shower the evening  before and morning of your procedure with Chlorhexidine (Hibiclens)  do not use Chlorhexidine on your face or genitals. Do not get in your eyes.  Wash your face with water and your regular face wash/soap  Use your regular shampoo  Apply Chlorhexidine (Hibiclens) directly on your skin or on a wet washcloth and wash gently. When showering: Move away from the shower stream when applying Chlorhexidine (Hibiclens) to avoid rinsing off too soon.  Rinse thoroughly with warm water  Do not dilute Chlorhexidine (Hibiclens)   Dry off as usual, do not use any deodorant, powder, body lotions, perfume, after shave or cologne.              Pre admit phone call completed.    Instructions given to patient about NPO status as follows:     The evening before surgery do not eat anything after 9 p.m. ( this includes hard candy, chewing gum and mints).  You may only have GATORADE, POWERADE AND WATER from 9 p.m. until you leave your home. DO NOT  DRINK ANY LIQUIDS ON THE WAY TO THE HOSPITAL.      Patient was also instructed on the below information:    Park in the Parking lot behind the hospital or in the [employer] [facility] across the street from the parking lot.  Parking is complimentary.  If you will be discharged the same day as your procedure, please arrange for a responsible adult to drive you home or  to accompany you if traveling by taxi.  YOU WILL NOT BE PERMITTED TO DRIVE OR TO LEAVE THE HOSPITAL ALONE AFTER SURGERY.  It is strongly recommended that you arrange for someone to remain with you for the first 24 hrs following your surgery.    Patient verbalized understanding of above instructions.

## 2024-04-01 ENCOUNTER — HOSPITAL ENCOUNTER (INPATIENT)
Facility: OTHER | Age: 34
LOS: 2 days | Discharge: HOME | DRG: 585 | End: 2024-04-03
Attending: SURGERY | Admitting: SURGERY
Payer: COMMERCIAL

## 2024-04-01 ENCOUNTER — ANESTHESIA (OUTPATIENT)
Dept: SURGERY | Facility: OTHER | Age: 34
DRG: 585 | End: 2024-04-01
Payer: COMMERCIAL

## 2024-04-01 DIAGNOSIS — Z01.818 PREOP TESTING: ICD-10-CM

## 2024-04-01 DIAGNOSIS — Z17.1 CARCINOMA OF UPPER-INNER QUADRANT OF LEFT BREAST IN FEMALE, ESTROGEN RECEPTOR NEGATIVE: Primary | ICD-10-CM

## 2024-04-01 DIAGNOSIS — C50.919 BREAST CANCER: ICD-10-CM

## 2024-04-01 DIAGNOSIS — C50.212 CARCINOMA OF UPPER-INNER QUADRANT OF LEFT BREAST IN FEMALE, ESTROGEN RECEPTOR NEGATIVE: Primary | ICD-10-CM

## 2024-04-01 LAB
ABO + RH BLD: NORMAL
B-HCG UR QL: NEGATIVE
BLD GP AB SCN CELLS X3 SERPL QL: NORMAL
CTP QC/QA: YES
SPECIMEN OUTDATE: NORMAL

## 2024-04-01 PROCEDURE — 19371 PERI-IMPLT CAPSLC BRST COMPL: CPT | Mod: 58,51,LT, | Performed by: SURGERY

## 2024-04-01 PROCEDURE — 63600175 PHARM REV CODE 636 W HCPCS: Performed by: STUDENT IN AN ORGANIZED HEALTH CARE EDUCATION/TRAINING PROGRAM

## 2024-04-01 PROCEDURE — 37000008 HC ANESTHESIA 1ST 15 MINUTES: Performed by: SURGERY

## 2024-04-01 PROCEDURE — 25000003 PHARM REV CODE 250: Performed by: STUDENT IN AN ORGANIZED HEALTH CARE EDUCATION/TRAINING PROGRAM

## 2024-04-01 PROCEDURE — 63600175 PHARM REV CODE 636 W HCPCS: Performed by: SURGERY

## 2024-04-01 PROCEDURE — 88300 SURGICAL PATH GROSS: CPT | Performed by: STUDENT IN AN ORGANIZED HEALTH CARE EDUCATION/TRAINING PROGRAM

## 2024-04-01 PROCEDURE — 37000009 HC ANESTHESIA EA ADD 15 MINS: Performed by: SURGERY

## 2024-04-01 PROCEDURE — D9220A PRA ANESTHESIA: Mod: CRNA,,,

## 2024-04-01 PROCEDURE — 63600175 PHARM REV CODE 636 W HCPCS

## 2024-04-01 PROCEDURE — 25000003 PHARM REV CODE 250

## 2024-04-01 PROCEDURE — 71000033 HC RECOVERY, INTIAL HOUR: Performed by: SURGERY

## 2024-04-01 PROCEDURE — C9290 INJ, BUPIVACAINE LIPOSOME: HCPCS | Performed by: SURGERY

## 2024-04-01 PROCEDURE — 0HPU0NZ REMOVAL OF TISSUE EXPANDER FROM LEFT BREAST, OPEN APPROACH: ICD-10-PCS | Performed by: SURGERY

## 2024-04-01 PROCEDURE — 0HRU077 REPLACEMENT OF LEFT BREAST USING DEEP INFERIOR EPIGASTRIC ARTERY PERFORATOR FLAP, OPEN APPROACH: ICD-10-PCS | Performed by: SURGERY

## 2024-04-01 PROCEDURE — 86901 BLOOD TYPING SEROLOGIC RH(D): CPT | Performed by: SURGERY

## 2024-04-01 PROCEDURE — 27201423 OPTIME MED/SURG SUP & DEVICES STERILE SUPPLY: Performed by: SURGERY

## 2024-04-01 PROCEDURE — P9045 ALBUMIN (HUMAN), 5%, 250 ML: HCPCS | Mod: JZ,JG

## 2024-04-01 PROCEDURE — 36415 COLL VENOUS BLD VENIPUNCTURE: CPT | Performed by: SURGERY

## 2024-04-01 PROCEDURE — 15860 IV NJX TST VASC FLO FLAP/GRF: CPT | Mod: 58,51,, | Performed by: SURGERY

## 2024-04-01 PROCEDURE — D9220A PRA ANESTHESIA: Mod: ANES,,, | Performed by: ANESTHESIOLOGY

## 2024-04-01 PROCEDURE — 36000708 HC OR TIME LEV III 1ST 15 MIN: Performed by: SURGERY

## 2024-04-01 PROCEDURE — C1729 CATH, DRAINAGE: HCPCS | Performed by: SURGERY

## 2024-04-01 PROCEDURE — 71000039 HC RECOVERY, EACH ADD'L HOUR: Performed by: SURGERY

## 2024-04-01 PROCEDURE — 81025 URINE PREGNANCY TEST: CPT | Performed by: ANESTHESIOLOGY

## 2024-04-01 PROCEDURE — 11000001 HC ACUTE MED/SURG PRIVATE ROOM

## 2024-04-01 PROCEDURE — 63600175 PHARM REV CODE 636 W HCPCS: Performed by: ANESTHESIOLOGY

## 2024-04-01 PROCEDURE — 4A1GXSH MONITORING OF SKIN AND BREAST VASCULAR PERFUSION USING INDOCYANINE GREEN DYE, EXTERNAL APPROACH: ICD-10-PCS | Performed by: SURGERY

## 2024-04-01 PROCEDURE — 88300 SURGICAL PATH GROSS: CPT | Mod: 26,,, | Performed by: STUDENT IN AN ORGANIZED HEALTH CARE EDUCATION/TRAINING PROGRAM

## 2024-04-01 PROCEDURE — 19364 BRST RCNSTJ FREE FLAP: CPT | Mod: 58,LT,, | Performed by: SURGERY

## 2024-04-01 PROCEDURE — 25000003 PHARM REV CODE 250: Performed by: ANESTHESIOLOGY

## 2024-04-01 PROCEDURE — 36000709 HC OR TIME LEV III EA ADD 15 MIN: Performed by: SURGERY

## 2024-04-01 PROCEDURE — 94799 UNLISTED PULMONARY SVC/PX: CPT

## 2024-04-01 PROCEDURE — 25000003 PHARM REV CODE 250: Performed by: SURGERY

## 2024-04-01 DEVICE — THE GEM MICROVASCULAR ANASTOMOTIC COUPLER DEVICE RINGS ARE MADE OF POLYETHYLENE AND SURGICAL GRADE STAINLESS STEEL PINS. A PROTECTIVE COVER AND JAW ASSEMBLY PROTECT THE RINGS AND ALLOW FOR EASY LOADING ONTO THE ANASTOMOTIC INSTRUMENT. BOTH THE PROTECTIVE COVER AND JAW ASSEMBLY ARE DISPOSABLE. THE GEM MICROVASCULAR ANASTOMOTIC COUPLER DEVICE IS SINGLE-USE AND AVAILABLE IN VARIOUS SIZES
Type: IMPLANTABLE DEVICE | Site: BREAST | Status: FUNCTIONAL
Brand: GEM MICROVASCULAR ANASTOMOTIC COUPLER

## 2024-04-01 RX ORDER — GABAPENTIN 300 MG/1
300 CAPSULE ORAL 3 TIMES DAILY
Status: DISCONTINUED | OUTPATIENT
Start: 2024-04-01 | End: 2024-04-03 | Stop reason: HOSPADM

## 2024-04-01 RX ORDER — ONDANSETRON HYDROCHLORIDE 2 MG/ML
INJECTION, SOLUTION INTRAVENOUS
Status: DISCONTINUED | OUTPATIENT
Start: 2024-04-01 | End: 2024-04-01

## 2024-04-01 RX ORDER — HEPARIN SODIUM 10000 [USP'U]/ML
INJECTION, SOLUTION INTRAVENOUS; SUBCUTANEOUS
Status: DISCONTINUED | OUTPATIENT
Start: 2024-04-01 | End: 2024-04-01 | Stop reason: HOSPADM

## 2024-04-01 RX ORDER — LIDOCAINE HYDROCHLORIDE AND EPINEPHRINE 10; 10 MG/ML; UG/ML
INJECTION, SOLUTION INFILTRATION; PERINEURAL
Status: DISCONTINUED | OUTPATIENT
Start: 2024-04-01 | End: 2024-04-01 | Stop reason: HOSPADM

## 2024-04-01 RX ORDER — ENOXAPARIN SODIUM 100 MG/ML
30 INJECTION SUBCUTANEOUS ONCE
Status: COMPLETED | OUTPATIENT
Start: 2024-04-01 | End: 2024-04-01

## 2024-04-01 RX ORDER — ALBUMIN HUMAN 50 G/1000ML
SOLUTION INTRAVENOUS CONTINUOUS PRN
Status: DISCONTINUED | OUTPATIENT
Start: 2024-04-01 | End: 2024-04-01

## 2024-04-01 RX ORDER — METOPROLOL TARTRATE 1 MG/ML
INJECTION, SOLUTION INTRAVENOUS
Status: DISCONTINUED | OUTPATIENT
Start: 2024-04-01 | End: 2024-04-01

## 2024-04-01 RX ORDER — FAMOTIDINE 10 MG/ML
INJECTION INTRAVENOUS
Status: DISCONTINUED | OUTPATIENT
Start: 2024-04-01 | End: 2024-04-01

## 2024-04-01 RX ORDER — LIDOCAINE HYDROCHLORIDE 10 MG/ML
0.5 INJECTION, SOLUTION EPIDURAL; INFILTRATION; INTRACAUDAL; PERINEURAL ONCE
Status: DISCONTINUED | OUTPATIENT
Start: 2024-04-01 | End: 2024-04-01 | Stop reason: HOSPADM

## 2024-04-01 RX ORDER — MEPERIDINE HYDROCHLORIDE 25 MG/ML
12.5 INJECTION INTRAMUSCULAR; INTRAVENOUS; SUBCUTANEOUS ONCE AS NEEDED
Status: DISCONTINUED | OUTPATIENT
Start: 2024-04-01 | End: 2024-04-01 | Stop reason: HOSPADM

## 2024-04-01 RX ORDER — IBUPROFEN 600 MG/1
600 TABLET ORAL EVERY 6 HOURS
Status: DISCONTINUED | OUTPATIENT
Start: 2024-04-01 | End: 2024-04-03 | Stop reason: HOSPADM

## 2024-04-01 RX ORDER — PROPOFOL 10 MG/ML
VIAL (ML) INTRAVENOUS
Status: DISCONTINUED | OUTPATIENT
Start: 2024-04-01 | End: 2024-04-01

## 2024-04-01 RX ORDER — ACETAMINOPHEN 325 MG/1
650 TABLET ORAL EVERY 6 HOURS
Status: DISCONTINUED | OUTPATIENT
Start: 2024-04-01 | End: 2024-04-03 | Stop reason: HOSPADM

## 2024-04-01 RX ORDER — METHOCARBAMOL 500 MG/1
1000 TABLET, FILM COATED ORAL 3 TIMES DAILY
Status: DISCONTINUED | OUTPATIENT
Start: 2024-04-01 | End: 2024-04-03 | Stop reason: HOSPADM

## 2024-04-01 RX ORDER — ACETAMINOPHEN 500 MG
1000 TABLET ORAL
Status: COMPLETED | OUTPATIENT
Start: 2024-04-01 | End: 2024-04-01

## 2024-04-01 RX ORDER — MORPHINE SULFATE 10 MG/ML
3 INJECTION INTRAMUSCULAR; INTRAVENOUS; SUBCUTANEOUS
Status: DISCONTINUED | OUTPATIENT
Start: 2024-04-01 | End: 2024-04-02

## 2024-04-01 RX ORDER — VECURONIUM BROMIDE FOR INJECTION 1 MG/ML
INJECTION, POWDER, LYOPHILIZED, FOR SOLUTION INTRAVENOUS
Status: DISCONTINUED | OUTPATIENT
Start: 2024-04-01 | End: 2024-04-01

## 2024-04-01 RX ORDER — FENTANYL CITRATE 50 UG/ML
INJECTION, SOLUTION INTRAMUSCULAR; INTRAVENOUS
Status: DISCONTINUED | OUTPATIENT
Start: 2024-04-01 | End: 2024-04-01

## 2024-04-01 RX ORDER — SODIUM CHLORIDE 9 MG/ML
INJECTION, SOLUTION INTRAVENOUS CONTINUOUS
Status: DISCONTINUED | OUTPATIENT
Start: 2024-04-01 | End: 2024-04-02

## 2024-04-01 RX ORDER — LIDOCAINE HYDROCHLORIDE 20 MG/ML
INJECTION INTRAVENOUS
Status: DISCONTINUED | OUTPATIENT
Start: 2024-04-01 | End: 2024-04-01

## 2024-04-01 RX ORDER — PROCHLORPERAZINE EDISYLATE 5 MG/ML
5 INJECTION INTRAMUSCULAR; INTRAVENOUS EVERY 30 MIN PRN
Status: DISCONTINUED | OUTPATIENT
Start: 2024-04-01 | End: 2024-04-01 | Stop reason: HOSPADM

## 2024-04-01 RX ORDER — MUPIROCIN 20 MG/G
OINTMENT TOPICAL 2 TIMES DAILY
Status: DISCONTINUED | OUTPATIENT
Start: 2024-04-01 | End: 2024-04-03 | Stop reason: HOSPADM

## 2024-04-01 RX ORDER — SODIUM CHLORIDE 0.9 % (FLUSH) 0.9 %
3 SYRINGE (ML) INJECTION
Status: DISCONTINUED | OUTPATIENT
Start: 2024-04-01 | End: 2024-04-01 | Stop reason: HOSPADM

## 2024-04-01 RX ORDER — CEFAZOLIN SODIUM 1 G/3ML
INJECTION, POWDER, FOR SOLUTION INTRAMUSCULAR; INTRAVENOUS
Status: DISCONTINUED | OUTPATIENT
Start: 2024-04-01 | End: 2024-04-01

## 2024-04-01 RX ORDER — EPHEDRINE SULFATE 50 MG/ML
INJECTION, SOLUTION INTRAVENOUS
Status: DISCONTINUED | OUTPATIENT
Start: 2024-04-01 | End: 2024-04-01

## 2024-04-01 RX ORDER — DIPHENHYDRAMINE HYDROCHLORIDE 50 MG/ML
25 INJECTION INTRAMUSCULAR; INTRAVENOUS EVERY 6 HOURS PRN
Status: DISCONTINUED | OUTPATIENT
Start: 2024-04-01 | End: 2024-04-01 | Stop reason: HOSPADM

## 2024-04-01 RX ORDER — LIDOCAINE HYDROCHLORIDE 40 MG/ML
INJECTION, SOLUTION RETROBULBAR
Status: DISCONTINUED | OUTPATIENT
Start: 2024-04-01 | End: 2024-04-01 | Stop reason: HOSPADM

## 2024-04-01 RX ORDER — ROCURONIUM BROMIDE 10 MG/ML
INJECTION, SOLUTION INTRAVENOUS
Status: DISCONTINUED | OUTPATIENT
Start: 2024-04-01 | End: 2024-04-01

## 2024-04-01 RX ORDER — OXYCODONE HYDROCHLORIDE 5 MG/1
5 TABLET ORAL EVERY 4 HOURS PRN
Status: DISCONTINUED | OUTPATIENT
Start: 2024-04-01 | End: 2024-04-03 | Stop reason: HOSPADM

## 2024-04-01 RX ORDER — HYDROMORPHONE HYDROCHLORIDE 2 MG/ML
0.4 INJECTION, SOLUTION INTRAMUSCULAR; INTRAVENOUS; SUBCUTANEOUS EVERY 5 MIN PRN
Status: DISCONTINUED | OUTPATIENT
Start: 2024-04-01 | End: 2024-04-01 | Stop reason: HOSPADM

## 2024-04-01 RX ORDER — SODIUM CHLORIDE, SODIUM LACTATE, POTASSIUM CHLORIDE, CALCIUM CHLORIDE 600; 310; 30; 20 MG/100ML; MG/100ML; MG/100ML; MG/100ML
INJECTION, SOLUTION INTRAVENOUS CONTINUOUS
Status: DISCONTINUED | OUTPATIENT
Start: 2024-04-01 | End: 2024-04-01

## 2024-04-01 RX ORDER — ENOXAPARIN SODIUM 100 MG/ML
40 INJECTION SUBCUTANEOUS EVERY 24 HOURS
Status: DISCONTINUED | OUTPATIENT
Start: 2024-04-02 | End: 2024-04-03 | Stop reason: HOSPADM

## 2024-04-01 RX ORDER — DEXAMETHASONE SODIUM PHOSPHATE 4 MG/ML
INJECTION, SOLUTION INTRA-ARTICULAR; INTRALESIONAL; INTRAMUSCULAR; INTRAVENOUS; SOFT TISSUE
Status: DISCONTINUED | OUTPATIENT
Start: 2024-04-01 | End: 2024-04-01

## 2024-04-01 RX ORDER — INDOCYANINE GREEN AND WATER 25 MG
KIT INJECTION
Status: DISCONTINUED | OUTPATIENT
Start: 2024-04-01 | End: 2024-04-01

## 2024-04-01 RX ORDER — DEXMEDETOMIDINE HYDROCHLORIDE 100 UG/ML
INJECTION, SOLUTION INTRAVENOUS
Status: DISCONTINUED | OUTPATIENT
Start: 2024-04-01 | End: 2024-04-01

## 2024-04-01 RX ORDER — ONDANSETRON 8 MG/1
8 TABLET, ORALLY DISINTEGRATING ORAL EVERY 8 HOURS PRN
Status: DISCONTINUED | OUTPATIENT
Start: 2024-04-01 | End: 2024-04-03 | Stop reason: HOSPADM

## 2024-04-01 RX ORDER — DOCUSATE SODIUM 100 MG/1
100 CAPSULE, LIQUID FILLED ORAL EVERY 12 HOURS
Status: DISCONTINUED | OUTPATIENT
Start: 2024-04-01 | End: 2024-04-03 | Stop reason: HOSPADM

## 2024-04-01 RX ORDER — PREGABALIN 75 MG/1
75 CAPSULE ORAL ONCE
Status: COMPLETED | OUTPATIENT
Start: 2024-04-01 | End: 2024-04-01

## 2024-04-01 RX ORDER — OXYCODONE HYDROCHLORIDE 5 MG/1
5 TABLET ORAL
Status: DISCONTINUED | OUTPATIENT
Start: 2024-04-01 | End: 2024-04-01 | Stop reason: HOSPADM

## 2024-04-01 RX ORDER — PHENYLEPHRINE HYDROCHLORIDE 10 MG/ML
INJECTION INTRAVENOUS
Status: DISCONTINUED | OUTPATIENT
Start: 2024-04-01 | End: 2024-04-01

## 2024-04-01 RX ORDER — KETAMINE HCL IN 0.9 % NACL 50 MG/5 ML
SYRINGE (ML) INTRAVENOUS
Status: DISCONTINUED | OUTPATIENT
Start: 2024-04-01 | End: 2024-04-01

## 2024-04-01 RX ORDER — HYDROMORPHONE HYDROCHLORIDE 2 MG/ML
INJECTION, SOLUTION INTRAMUSCULAR; INTRAVENOUS; SUBCUTANEOUS
Status: DISCONTINUED | OUTPATIENT
Start: 2024-04-01 | End: 2024-04-01

## 2024-04-01 RX ORDER — BUPIVACAINE HYDROCHLORIDE 2.5 MG/ML
INJECTION, SOLUTION EPIDURAL; INFILTRATION; INTRACAUDAL
Status: DISCONTINUED | OUTPATIENT
Start: 2024-04-01 | End: 2024-04-01 | Stop reason: HOSPADM

## 2024-04-01 RX ADMIN — SODIUM CHLORIDE, SODIUM LACTATE, POTASSIUM CHLORIDE, AND CALCIUM CHLORIDE: .6; .31; .03; .02 INJECTION, SOLUTION INTRAVENOUS at 09:04

## 2024-04-01 RX ADMIN — DOCUSATE SODIUM 100 MG: 100 CAPSULE, LIQUID FILLED ORAL at 08:04

## 2024-04-01 RX ADMIN — DEXAMETHASONE SODIUM PHOSPHATE 8 MG: 4 INJECTION, SOLUTION INTRAMUSCULAR; INTRAVENOUS at 07:04

## 2024-04-01 RX ADMIN — ENOXAPARIN SODIUM 30 MG: 30 INJECTION SUBCUTANEOUS at 06:04

## 2024-04-01 RX ADMIN — OXYCODONE 5 MG: 5 TABLET ORAL at 06:04

## 2024-04-01 RX ADMIN — MUPIROCIN: 20 OINTMENT TOPICAL at 08:04

## 2024-04-01 RX ADMIN — PROCHLORPERAZINE EDISYLATE 2.5 MG: 5 INJECTION INTRAMUSCULAR; INTRAVENOUS at 12:04

## 2024-04-01 RX ADMIN — SUGAMMADEX 200 MG: 100 INJECTION, SOLUTION INTRAVENOUS at 11:04

## 2024-04-01 RX ADMIN — FENTANYL CITRATE 100 MCG: 50 INJECTION, SOLUTION INTRAMUSCULAR; INTRAVENOUS at 07:04

## 2024-04-01 RX ADMIN — GABAPENTIN 300 MG: 300 CAPSULE ORAL at 03:04

## 2024-04-01 RX ADMIN — LIDOCAINE HYDROCHLORIDE 100 MG: 20 INJECTION, SOLUTION INTRAVENOUS at 07:04

## 2024-04-01 RX ADMIN — PHENYLEPHRINE HYDROCHLORIDE 100 MCG: 10 INJECTION INTRAVENOUS at 08:04

## 2024-04-01 RX ADMIN — HYDROMORPHONE HYDROCHLORIDE 0.6 MG: 2 INJECTION INTRAMUSCULAR; INTRAVENOUS; SUBCUTANEOUS at 07:04

## 2024-04-01 RX ADMIN — DEXMEDETOMIDINE HYDROCHLORIDE 20 MCG: 100 INJECTION, SOLUTION, CONCENTRATE INTRAVENOUS at 07:04

## 2024-04-01 RX ADMIN — IBUPROFEN 600 MG: 600 TABLET, FILM COATED ORAL at 06:04

## 2024-04-01 RX ADMIN — HYDROMORPHONE HYDROCHLORIDE 0.4 MG: 2 INJECTION INTRAMUSCULAR; INTRAVENOUS; SUBCUTANEOUS at 07:04

## 2024-04-01 RX ADMIN — EPHEDRINE SULFATE 5 MG: 50 INJECTION INTRAVENOUS at 09:04

## 2024-04-01 RX ADMIN — PROPOFOL 20 MG: 10 INJECTION, EMULSION INTRAVENOUS at 10:04

## 2024-04-01 RX ADMIN — METHOCARBAMOL 1000 MG: 500 TABLET ORAL at 08:04

## 2024-04-01 RX ADMIN — ALBUMIN (HUMAN) 500 ML/HR: 12.5 SOLUTION INTRAVENOUS at 08:04

## 2024-04-01 RX ADMIN — Medication 20 MG: at 07:04

## 2024-04-01 RX ADMIN — Medication 10 MG: at 09:04

## 2024-04-01 RX ADMIN — PREGABALIN 75 MG: 75 CAPSULE ORAL at 06:04

## 2024-04-01 RX ADMIN — ACETAMINOPHEN 1000 MG: 500 TABLET ORAL at 06:04

## 2024-04-01 RX ADMIN — ROCURONIUM BROMIDE 50 MG: 10 INJECTION INTRAVENOUS at 07:04

## 2024-04-01 RX ADMIN — PROPOFOL 180 MG: 10 INJECTION, EMULSION INTRAVENOUS at 07:04

## 2024-04-01 RX ADMIN — SODIUM CHLORIDE, SODIUM LACTATE, POTASSIUM CHLORIDE, AND CALCIUM CHLORIDE: .6; .31; .03; .02 INJECTION, SOLUTION INTRAVENOUS at 11:04

## 2024-04-01 RX ADMIN — FAMOTIDINE 20 MG: 10 INJECTION, SOLUTION INTRAVENOUS at 07:04

## 2024-04-01 RX ADMIN — ACETAMINOPHEN 650 MG: 325 TABLET, FILM COATED ORAL at 06:04

## 2024-04-01 RX ADMIN — IBUPROFEN 600 MG: 600 TABLET, FILM COATED ORAL at 12:04

## 2024-04-01 RX ADMIN — SODIUM CHLORIDE: 9 INJECTION, SOLUTION INTRAVENOUS at 03:04

## 2024-04-01 RX ADMIN — INDOCYANINE GREEN 25 MG: KIT INTRAVENOUS at 09:04

## 2024-04-01 RX ADMIN — HYDROMORPHONE HYDROCHLORIDE 0.2 MG: 2 INJECTION INTRAMUSCULAR; INTRAVENOUS; SUBCUTANEOUS at 11:04

## 2024-04-01 RX ADMIN — Medication 10 MG: at 07:04

## 2024-04-01 RX ADMIN — PHENYLEPHRINE HYDROCHLORIDE 0.1 MCG/KG/MIN: 10 INJECTION INTRAVENOUS at 08:04

## 2024-04-01 RX ADMIN — GLYCOPYRROLATE 0.2 MG: 0.2 INJECTION, SOLUTION INTRAMUSCULAR; INTRAVITREAL at 07:04

## 2024-04-01 RX ADMIN — ACETAMINOPHEN 650 MG: 325 TABLET, FILM COATED ORAL at 12:04

## 2024-04-01 RX ADMIN — SODIUM CHLORIDE, SODIUM LACTATE, POTASSIUM CHLORIDE, AND CALCIUM CHLORIDE: .6; .31; .03; .02 INJECTION, SOLUTION INTRAVENOUS at 07:04

## 2024-04-01 RX ADMIN — EPHEDRINE SULFATE 5 MG: 50 INJECTION INTRAVENOUS at 10:04

## 2024-04-01 RX ADMIN — METOPROLOL TARTRATE 2 MG: 5 INJECTION, SOLUTION INTRAVENOUS at 11:04

## 2024-04-01 RX ADMIN — HYDROMORPHONE HYDROCHLORIDE 0.4 MG: 2 INJECTION INTRAMUSCULAR; INTRAVENOUS; SUBCUTANEOUS at 11:04

## 2024-04-01 RX ADMIN — GABAPENTIN 300 MG: 300 CAPSULE ORAL at 08:04

## 2024-04-01 RX ADMIN — Medication 10 MG: at 08:04

## 2024-04-01 RX ADMIN — OXYCODONE 5 MG: 5 TABLET ORAL at 11:04

## 2024-04-01 RX ADMIN — DEXTROSE MONOHYDRATE 1 G: 5 INJECTION INTRAVENOUS at 03:04

## 2024-04-01 RX ADMIN — HYDROMORPHONE HYDROCHLORIDE 0.4 MG: 2 INJECTION INTRAMUSCULAR; INTRAVENOUS; SUBCUTANEOUS at 09:04

## 2024-04-01 RX ADMIN — METHOCARBAMOL 1000 MG: 500 TABLET ORAL at 03:04

## 2024-04-01 RX ADMIN — CEFAZOLIN 2 G: 330 INJECTION, POWDER, FOR SOLUTION INTRAMUSCULAR; INTRAVENOUS at 07:04

## 2024-04-01 RX ADMIN — HYDROMORPHONE HYDROCHLORIDE 0.4 MG: 2 INJECTION INTRAMUSCULAR; INTRAVENOUS; SUBCUTANEOUS at 12:04

## 2024-04-01 RX ADMIN — VECURONIUM BROMIDE FOR INJECTION 3 MG: 1 INJECTION, POWDER, LYOPHILIZED, FOR SOLUTION INTRAVENOUS at 09:04

## 2024-04-01 RX ADMIN — ONDANSETRON 4 MG: 2 INJECTION INTRAMUSCULAR; INTRAVENOUS at 10:04

## 2024-04-01 RX ADMIN — VECURONIUM BROMIDE FOR INJECTION 3 MG: 1 INJECTION, POWDER, LYOPHILIZED, FOR SOLUTION INTRAVENOUS at 10:04

## 2024-04-01 NOTE — ANESTHESIA PROCEDURE NOTES
Intubation    Date/Time: 4/1/2024 7:22 AM    Performed by: Heath Fairchild CRNA  Authorized by: Fredi Valle MD    Intubation:     Induction:  Intravenous    Intubated:  Postinduction    Mask Ventilation:  Easy with oral airway    Attempts:  1    Attempted By:  CRNA    Method of Intubation:  Video laryngoscopy    Blade:  Ellis 3    Laryngeal View Grade: Grade I - full view of cords      Difficult Airway Encountered?: No      Complications:  None    Airway Device:  Oral endotracheal tube    Airway Device Size:  7.0    Style/Cuff Inflation:  Cuffed (inflated to minimal occlusive pressure)    Tube secured:  21    Secured at:  The lips    Placement Verified By:  Capnometry    Complicating Factors:  None    Findings Post-Intubation:  BS equal bilateral and atraumatic/condition of teeth unchanged

## 2024-04-01 NOTE — OR NURSING
VSS on RA. Pain is tolerable per pt. Left breast incision with dermabond, Abd pad, and surgical bra, CDI. Abdominal incision dressing with abd pad and abdominal binder, CDI. X3 JAIRO drains with CHG dressings, CDI. Choi in place draining. Left flap soft, warm, and good doppler. PIV CDI. Meets PACU discharge criteria. Ready for transfer to [address] [place] [address]. Report given to PHILIP Rosenberg. Family notified.

## 2024-04-01 NOTE — ANESTHESIA POSTPROCEDURE EVALUATION
Anesthesia Post Evaluation    Patient: Alisha Cardenas    Procedure(s) Performed: Procedure(s) (LRB):  REMOVAL, IMPLANT, BREAST (Left)  RECONSTRUCTION, BREAST, USING LETY FREE FLAP (Left)    Final Anesthesia Type: general      Patient location during evaluation: PACU  Patient participation: Yes- Able to Participate  Level of consciousness: awake and alert  Post-procedure vital signs: reviewed and stable  Pain management: adequate  Airway patency: patent    PONV status at discharge: No PONV  Anesthetic complications: no      Cardiovascular status: blood pressure returned to baseline  Respiratory status: unassisted and spontaneous ventilation  Hydration status: euvolemic  Follow-up not needed.              Vitals Value Taken Time   /85 04/01/24 1236   Temp 36.2 °C (97.2 °F) 04/01/24 1124   Pulse 97 04/01/24 1244   Resp 16 04/01/24 1221   SpO2 95 % 04/01/24 1244   Vitals shown include unvalidated device data.      No case tracking events are documented in the log.      Pain/Rimma Score: Pain Rating Prior to Med Admin: 10 (4/1/2024 12:15 PM)  Pain Rating Post Med Admin: 8 (4/1/2024 12:24 PM)  Rimma Score: 10 (4/1/2024 12:24 PM)

## 2024-04-01 NOTE — ANESTHESIA PREPROCEDURE EVALUATION
04/01/2024  Alisha Cardenas is a 33 y.o., female.      Pre-op Assessment    I have reviewed the Patient Summary Reports.     I have reviewed the Nursing Notes. I have reviewed the NPO Status.   I have reviewed the Medications.     Review of Systems  Anesthesia Hx:             Denies Family Hx of Anesthesia complications.    Denies Personal Hx of Anesthesia complications.                    Social:  Non-Smoker       Hematology/Oncology:  Hematology Normal   Oncology Normal                                   EENT/Dental:  EENT/Dental Normal           Cardiovascular:  Cardiovascular Normal                                            Pulmonary:    Asthma                    Renal/:  Renal/ Normal                 Hepatic/GI:  Hepatic/GI Normal                 Musculoskeletal:  Musculoskeletal Normal                Neurological:  Neurology Normal                                      Endocrine:  Endocrine Normal            Dermatological:  Skin Normal    Psych:  Psychiatric Normal                    Physical Exam  General: Well nourished and Alert    Airway:  Mallampati: II   Mouth Opening: Normal  Tongue: Normal    Dental:  Intact        Anesthesia Plan  Type of Anesthesia, risks & benefits discussed:    Anesthesia Type: Gen ETT  Intra-op Monitoring Plan: Standard ASA Monitors  Post Op Pain Control Plan: multimodal analgesia  Induction:  IV  Airway Plan: Video  Informed Consent: Informed consent signed with the Patient and all parties understand the risks and agree with anesthesia plan.  All questions answered.   ASA Score: 2  Anesthesia Plan Notes:       Ready For Surgery From Anesthesia Perspective.     .

## 2024-04-01 NOTE — TRANSFER OF CARE
"Anesthesia Transfer of Care Note    Patient: Alisha Cardenas    Procedure(s) Performed: Procedure(s) (LRB):  REMOVAL, IMPLANT, BREAST (Left)  RECONSTRUCTION, BREAST, USING LETY FREE FLAP (Left)    Patient location: PACU    Anesthesia Type: general    Transport from OR: Transported from OR on room air with adequate spontaneous ventilation    Post pain: adequate analgesia    Post assessment: no apparent anesthetic complications and tolerated procedure well    Post vital signs: stable    Level of consciousness: awake    Nausea/Vomiting: no nausea/vomiting    Complications: none    Transfer of care protocol was followed      Last vitals: Visit Vitals  /86 (BP Location: Right arm, Patient Position: Lying)   Pulse 73   Temp 36.7 °C (98 °F) (Oral)   Resp 18   Ht 5' 1" (1.549 m)   Wt 81.6 kg (180 lb)   LMP 03/17/2024 (Approximate)   SpO2 97%   Breastfeeding No   BMI 34.01 kg/m²     "

## 2024-04-01 NOTE — OP NOTE
[facility]  Plastic Surgery  Operative Note    SUMMARY     Date of Procedure: 4/1/2024     Location:  [facility]    Procedure(s):   Delayed left breast reconstruction with LETY flap  Removal of left tissue expander  Left breast capsulorrhaphy  Partial 3rd rib excision  ICG angiography to assess flap perfusion  Bilateral tap and left intercostal rib blocks with Exparel for postoperative pain control     Surgeon(s) and Role:     * Mehran Street, DO - Primary     * Marco Antonio Montalvo MD - Fellow     * Heath Michelle MD - Co-Surgeon    Assistant: OSVALDO Graham and Marco Antonio Montalvo MD, Fellow    Pre-Operative Diagnosis: Carcinoma of upper-inner quadrant of left breast in female, estrogen receptor negative [C50.212, Z17.1]    Post-Operative Diagnosis: same    Anesthesia: General    Indications for Procedure:  33-year-old woman with left breast cancer.  At the time of mastectomy she underwent nipple sparing mastectomy through a vertical radial incision and tissue expander placement.  She declined any adjuvant therapies.  She presents today for delayed LETY flap reconstruction.  She was unable to get a CTA due to contrast allergy or an MRA due to the presence of the tissue expander so vessel identification was done intraoperatively using portable color-flow Doppler ultrasound    Operative Findings (including complications, if any):   Removal of textured 600 cc Artoura high-profile tissue expander, it was total weight was 738 g  Micro information:  Donor site:  Right hemiabdomen  Recipient site:  Left breast   Weight:  738 gm  Number of perforators:  4  Pedicle Artery:  Deep inferior epigastric  Recipient Artery:  MOI  Arterial anastomosis size:  2 mm  Recipient vein:  IMV  Vein  size:  2 mm  Additional Vein:  None      Description of Procedures:  Patient was seen in the preoperative holding area.  The midline of the abdomen, lower abdominal skin folds, and breast borders  were marked.  Surgical consents were signed a preoperative visit.  She was taken to the operating room.  General anesthesia was induced.      Using a D-Ã[employer]G Thermoset portable color-flow Doppler ultrasound dominant abdominal wall perforators were marked on the right and left hemiabdomen.  I planned on using the right hemiabdomen.  There was not a superficial vein that could be traced all the way down to the groin.  She did have a history of a .  Next bilateral LETY flaps were designed and measured for symmetry.  Lidocaine with epinephrine was injected around the border of the flaps and also in the previous mastectomy incision.  The patient was prepped and draped in sterile fashion.  Time-out was performed.    I began by making the right lower abdominal incision.  It was not encounter any significant superficial vein.  I did see vein that appeared occluded.  I think the superficial vein was very small and narrowed through scar and and have significant flow through it.  Then dissected straight down to the anterior abdominal wall.  I then made the upper border of the flap.  It was somewhat beveled upwards down to the abdominal wall.  The flap was then elevated from lateral to medial.  I discovered 3-4 small lateral perforators.  Next I incised around the umbilicus using a 15 blade in the midline the flap.  I then dissected the flap from medial to lateral.  It was discovered 1 large periumbilical , and 1 other small medial .  He had a cluster of 3 perforators in the central portion of the flap.  I then connected the fascial defects between these 3.  Both were freed from the surrounding anterior abdominal wall fascia.  I began with the lowest and largest 1.  It had a long partial-thickness intramuscular course running medially.  The higher medial  had a similar long intramuscular course.  The more medial lowest  went straight down.  Was suspicious this was a type 1 anatomy based on  the course of the vessels.  It was viable the small amount of muscle and completed the  dissection for the 2 lateral perforators.  The smallest  inferiorly was small was divided.  Listened with the pencil Doppler.  The medial had the most robust flow.  I then extended the fascial incision superiorly to include this.  It was freed from the fascia and a  dissection was done down to the main pedicle.  The distal flow was divided.  There was a significant pain but not much artery.  It was a small perforating artery adjacent to the lateral .  It was also included.  In the end a small cuff of fascia was taken between the 3 lowest perforators.  Any significant branches were divided and dissected the pedicle towards the lateral border of the rectus muscle.  In the area of the  there was some significant scarring around the pedicle and with the pedicle to the peritoneum.  Finally distally windows were created in the flap was temporarily stapled in place.      At the same time of the flap dissection Dr. Michelle prepared the vessels in the chest.  First the previous vertical radial incision was reopened.  It was extended somewhat medially along the IMF it was exposure using the Bovie the capsule was entered.  There was some Braun implant seroma which was drained.  There was a textured 600 cc Artoura high-profile tissue expander which was removed intact.  There were some PDS sutures anchoring it to the chest wall which were removed.  Next superior and lateral capsulorrhaphy were performed.  This allowed with the excursion of the upper pole over the flap and also released some scar bands laterally.  Next the capsule was incised over the pectoralis muscle over the 3rd rib space.  The pectoralis muscle was incised along its fibers it was retracted.  The perichondrium was incised and freed from the rib cartilage using a periosteal elevator.  Next using a rongeur were the cartilaginous  portion of the 3rd rib was removed.  Finally the posterior perichondrium and intercostal muscles were removed in block exposing the internal mammary artery and vein.  There was a superior perforating artery and vein that was identified and protected.  Several small branches were ligated with clips.  The artery and vein were each about 2 mm in size and were skeletonized for about 2 cm.  Once they were isolated they were covered with a moist Ray-Ciara and allowed to soak in 4% lidocaine.    An extra to spy the flap.  4 cc of reconstituted ICG was injected through the IV.  Using the Vision sense system ICG angiography was performed.  It was initially some slow filling of the flap relative to the surrounding abdominal tissue, however a 2 minutes of the flap appeared well-perfused.  It had with a homogeneous appearance.  T there was some areas of poor perfusion around the lower border of the flap and the lower portion of midline.  It was may have been due to the lidocaine and epinephrine that was injected with the flap was clearly larger than we needed for the breast.  That lower tissue and some medial tissue was removed taking care to save the superficial vein.  Which was not tensely dilated.      Next the flap was de-epithelialized save for a small area at 6:00 oclock.  The pedicle was divided with clips.  They were checked for hemostasis.  The pedicle was oriented on the flap and it was irrigated with 10 cc of warm heparinized saline.  The flap was then weighed.      Next the flap was affixed to the chest using warm moist laparotomy pads.  It was stapled into place.  The operating microscope was brought in.  Some of the scar connecting the pedicle artery and vein was then divided under the microscope.  The internal mammary vein was oriented with a marking pen.  It was single green approximating clamp was used it was divided distally.  Both veins were measured and a 2 mm  was felt to be appropriate.  A coupling  device was then used for an end-to-end anastomosis using a 2 mm .  There was no bleeding from the anastomosis of the clip was removed.  The approximating clamp was then placed on the artery.  The artery was then divided distally.  A small amount of adventitia was cleaned from the artery.  It had a clean intima.  A double green approximating clamp was used in the end-to-end hand-sewn anastomosis was done using a 9 0 nylon suture.  It was a small open branch immediately adjacent to the anastomosis on the MOI that was clipped.  With a single green approximating clamps removed there was no bleeding from the anastomosis.  It was a strong pulse in the pedicle that was palpable.  Appifier Doppler was placed on the artery distal to the anastomosis.      The operating microscope was then removed.  Some additional lateral tissue of the flap was removed.  The flap was then placed into the pocket.  It was sutured to the chest wall using several 2-0 Vicryl sutures.  The mastectomy skin was able to close over the flap.  The remaining skin was then de-epithelialized.  After subtraction the final flap weight was 738 g.  The mastectomy skin was then closed with buried 3-0 Monocryl and running 3-0 Stratafix.  The [employer] Doppler cord was repositioned in stapled to the chest wall.    While the micro was being performed Dr. Michelle removed the other abdominal flap.  It was incised full-thickness superior laterally along the flap as had previously been marked.  It was lifted off of the chest wall from lateral to medial.  Any significant perforators were ligated with clips.  The donor site was also closed.  2-0 Vicryl was used to approximate any divided rectus abdominis muscle.  The fascia was then closed using interrupted 0 Prolene mattress sutures followed by a running 0 V lock.  The central portion of the abdomen was undermined.  The patient had a diastasis and a plication was performed.  A 1. PDO barbed suture was used in a running  fashion superiorly and inferiorly from the umbilicus to perform the abdominal plication    Bilateral tap blocks were performed with direct injection as was the intercostal rib blocks.  Additional Exparel was placed along the upper abdominal plication.    A 15 Nigerian Jonathan drain was placed and brought out the anterior axillary line and 215 Nigerian Jonathan drains were placed below the incision of the abdomen.  The bed was flexed so the skin could temporarily stapled closed.  The position of the umbilicus was marked.  Berto's fascia was closed with a running 0 PDO barbed suture.  The deep dermis was approximated with a 3-0 Monocryl.  Skin was closed with a running subcuticular 3-0 Stratafix.  A small horizontal incision was made overlying the umbilicus.  The belly button was grasped and brought through.  Excess length of the belly button was then trimmed.  The belly button was inset with interrupted 3-0 Monocryl and running 5 0 plain gut suture.      The patient was cleaned. CHG drain dressings were placed on all of the drain sites.  The internal Doppler was secured using a Tegaderm.  The patient was padded in a postoperative bra and abdominal binder.  She was extubated and taken to recovery in stable condition.  She tolerated the procedure well.    Significant Surgical Tasks Conducted by the Assistant(s), if Applicable: The presence of an additional attending surgeon functioning as a co-surgeon  was required due to the complexity of the procedure relative to any available residents    Estimated Blood Loss (EBL):  50 mL           Implants:   Implant Name Type Inv. Item Serial No.  Lot No. LRB No. Used Action   EXPANDER ARTOURA 600 [id number] - [id number]  EXPANDER ARTOURA 600 [id number]  [employer] [id number] Left 1 Explanted    2.0MM - [id number]   2.0MM  [employer] [id number] Left 1 Implanted       Specimens:   Specimen (24h ago, onward)       Start     Ordered    04/01/24 1030   Specimen to Pathology, Surgery Breast  Once        Comments: Pre-op Diagnosis: Carcinoma of upper-inner quadrant of left breast in female, estrogen receptor negative [C50.212, Z17.1]Procedure(s):REMOVAL, IMPLANT, BREASTRECONSTRUCTION, BREAST, USING LETY FREE FLAP Number of specimens: 1Name of specimens: 1) LEFT BREAST TISSUE EXPANDER;     References:    Click here for ordering Quick Tip   Question Answer Comment   Procedure Type: Breast    Specimen Class: Known or suspected malignancy    Which provider would you like to cc? ALEXANDR RINCON    Release to patient Immediate        04/01/24 1030                            Condition: Good    Disposition: PACU - hemodynamically stable., we will admit to the floor with q.4h flap checks, bedrest overnight    Attestation: I was present and scrubbed for the entire procedure.

## 2024-04-02 LAB
BASOPHILS # BLD AUTO: 0.04 K/UL (ref 0–0.2)
BASOPHILS NFR BLD: 0.3 % (ref 0–1.9)
DIFFERENTIAL METHOD BLD: ABNORMAL
EOSINOPHIL # BLD AUTO: 0 K/UL (ref 0–0.5)
EOSINOPHIL NFR BLD: 0.1 % (ref 0–8)
ERYTHROCYTE [DISTWIDTH] IN BLOOD BY AUTOMATED COUNT: 15.9 % (ref 11.5–14.5)
FINAL PATHOLOGIC DIAGNOSIS: NORMAL
GROSS: NORMAL
HCT VFR BLD AUTO: 29.7 % (ref 37–48.5)
HGB BLD-MCNC: 9.2 G/DL (ref 12–16)
IMM GRANULOCYTES # BLD AUTO: 0.04 K/UL (ref 0–0.04)
IMM GRANULOCYTES NFR BLD AUTO: 0.3 % (ref 0–0.5)
LYMPHOCYTES # BLD AUTO: 2.5 K/UL (ref 1–4.8)
LYMPHOCYTES NFR BLD: 21 % (ref 18–48)
Lab: NORMAL
MCH RBC QN AUTO: 24.9 PG (ref 27–31)
MCHC RBC AUTO-ENTMCNC: 31 G/DL (ref 32–36)
MCV RBC AUTO: 80 FL (ref 82–98)
MONOCYTES # BLD AUTO: 1.2 K/UL (ref 0.3–1)
MONOCYTES NFR BLD: 10 % (ref 4–15)
NEUTROPHILS # BLD AUTO: 8.2 K/UL (ref 1.8–7.7)
NEUTROPHILS NFR BLD: 68.3 % (ref 38–73)
NRBC BLD-RTO: 0 /100 WBC
PLATELET # BLD AUTO: 409 K/UL (ref 150–450)
PMV BLD AUTO: 10.1 FL (ref 9.2–12.9)
RBC # BLD AUTO: 3.7 M/UL (ref 4–5.4)
WBC # BLD AUTO: 11.98 K/UL (ref 3.9–12.7)

## 2024-04-02 PROCEDURE — 85025 COMPLETE CBC W/AUTO DIFF WBC: CPT | Performed by: STUDENT IN AN ORGANIZED HEALTH CARE EDUCATION/TRAINING PROGRAM

## 2024-04-02 PROCEDURE — 99900035 HC TECH TIME PER 15 MIN (STAT)

## 2024-04-02 PROCEDURE — 11000001 HC ACUTE MED/SURG PRIVATE ROOM

## 2024-04-02 PROCEDURE — 25000003 PHARM REV CODE 250: Performed by: STUDENT IN AN ORGANIZED HEALTH CARE EDUCATION/TRAINING PROGRAM

## 2024-04-02 PROCEDURE — 36415 COLL VENOUS BLD VENIPUNCTURE: CPT | Performed by: STUDENT IN AN ORGANIZED HEALTH CARE EDUCATION/TRAINING PROGRAM

## 2024-04-02 PROCEDURE — 94799 UNLISTED PULMONARY SVC/PX: CPT

## 2024-04-02 PROCEDURE — 94761 N-INVAS EAR/PLS OXIMETRY MLT: CPT

## 2024-04-02 PROCEDURE — 63600175 PHARM REV CODE 636 W HCPCS: Performed by: STUDENT IN AN ORGANIZED HEALTH CARE EDUCATION/TRAINING PROGRAM

## 2024-04-02 RX ADMIN — OXYCODONE 5 MG: 5 TABLET ORAL at 12:04

## 2024-04-02 RX ADMIN — DOCUSATE SODIUM 100 MG: 100 CAPSULE, LIQUID FILLED ORAL at 07:04

## 2024-04-02 RX ADMIN — OXYCODONE 5 MG: 5 TABLET ORAL at 04:04

## 2024-04-02 RX ADMIN — METHOCARBAMOL 1000 MG: 500 TABLET ORAL at 08:04

## 2024-04-02 RX ADMIN — GABAPENTIN 300 MG: 300 CAPSULE ORAL at 02:04

## 2024-04-02 RX ADMIN — DOCUSATE SODIUM 100 MG: 100 CAPSULE, LIQUID FILLED ORAL at 08:04

## 2024-04-02 RX ADMIN — OXYCODONE 5 MG: 5 TABLET ORAL at 07:04

## 2024-04-02 RX ADMIN — ACETAMINOPHEN 650 MG: 325 TABLET, FILM COATED ORAL at 11:04

## 2024-04-02 RX ADMIN — IBUPROFEN 600 MG: 600 TABLET, FILM COATED ORAL at 05:04

## 2024-04-02 RX ADMIN — ACETAMINOPHEN 650 MG: 325 TABLET, FILM COATED ORAL at 12:04

## 2024-04-02 RX ADMIN — OXYCODONE 5 MG: 5 TABLET ORAL at 02:04

## 2024-04-02 RX ADMIN — IBUPROFEN 600 MG: 600 TABLET, FILM COATED ORAL at 11:04

## 2024-04-02 RX ADMIN — OXYCODONE 5 MG: 5 TABLET ORAL at 10:04

## 2024-04-02 RX ADMIN — ENOXAPARIN SODIUM 40 MG: 40 INJECTION SUBCUTANEOUS at 08:04

## 2024-04-02 RX ADMIN — GABAPENTIN 300 MG: 300 CAPSULE ORAL at 08:04

## 2024-04-02 RX ADMIN — ACETAMINOPHEN 650 MG: 325 TABLET, FILM COATED ORAL at 05:04

## 2024-04-02 RX ADMIN — MUPIROCIN: 20 OINTMENT TOPICAL at 08:04

## 2024-04-02 RX ADMIN — SODIUM CHLORIDE: 9 INJECTION, SOLUTION INTRAVENOUS at 12:04

## 2024-04-02 RX ADMIN — IBUPROFEN 600 MG: 600 TABLET, FILM COATED ORAL at 12:04

## 2024-04-02 RX ADMIN — OXYCODONE 5 MG: 5 TABLET ORAL at 11:04

## 2024-04-02 RX ADMIN — METHOCARBAMOL 1000 MG: 500 TABLET ORAL at 02:04

## 2024-04-02 RX ADMIN — METHOCARBAMOL 1000 MG: 500 TABLET ORAL at 07:04

## 2024-04-02 NOTE — PLAN OF CARE
Patient independent in ADLs, denies HH or DME use. Patient livfes with niece--Sandy Melgoza and niece will transport patient home at discharge.    04/02/24 1141   Discharge Assessment   Assessment Type Discharge Planning Assessment   Confirmed/corrected address, phone number and insurance Yes   Confirmed Demographics Correct on Facesheet   Source of Information patient   Communicated ANGELICA with patient/caregiver Date not available/Unable to determine   People in Home other relative(s)   Do you expect to return to your current living situation? Yes   Prior to hospitilization cognitive status: Alert/Oriented   Current cognitive status: Alert/Oriented   Walking or Climbing Stairs Difficulty no   Dressing/Bathing Difficulty no   Equipment Currently Used at Home none   Readmission within 30 days? No   Patient currently being followed by outpatient case management? No   Do you currently have service(s) that help you manage your care at home? No   Do you take prescription medications? Yes   Do you have any problems affording any of your prescribed medications? No   Is the patient taking medications as prescribed? yes   How do you get to doctors appointments? agency   Are you on dialysis? No   Do you take coumadin? No   Discharge Plan A Home with family   DME Needed Upon Discharge  none   Discharge Plan discussed with: Patient   Transition of Care Barriers None   Physical Activity   On average, how many days per week do you engage in moderate to strenuous exercise (like a brisk walk)? 0 days   On average, how many minutes do you engage in exercise at this level? 0 min   Financial Resource Strain   How hard is it for you to pay for the very basics like food, housing, medical care, and heating? Somewhat   Housing Stability   In the last 12 months, was there a time when you were not able to pay the mortgage or rent on time? N   In the last 12 months, how many places have you lived? 1   In the last 12 months, was there a time  when you did not have a steady place to sleep or slept in a shelter (including now)? N   Transportation Needs   In the past 12 months, has lack of transportation kept you from medical appointments or from getting medications? no   In the past 12 months, has lack of transportation kept you from meetings, work, or from getting things needed for daily living? No   Food Insecurity   Within the past 12 months, you worried that your food would run out before you got the money to buy more. Sometimes   Within the past 12 months, the food you bought just didn't last and you didn't have money to get more. Never true   Stress   Do you feel stress - tense, restless, nervous, or anxious, or unable to sleep at night because your mind is troubled all the time - these days? To some exte   Social Connections   In a typical week, how many times do you talk on the phone with family, friends, or neighbors? More than 3   How often do you get together with friends or relatives? Once   How often do you attend Religious or Baptist services? Never   Do you belong to any clubs or organizations such as Religious groups, unions, fraternal or athletic groups, or school groups? No   How often do you attend meetings of the clubs or organizations you belong to? Never   Are you , , , , never , or living with a partner? Never marrie   Alcohol Use   Q1: How often do you have a drink containing alcohol? 2-4 pr month   Q2: How many drinks containing alcohol do you have on a typical day when you are drinking? 1 or 2   Q3: How often do you have six or more drinks on one occasion? Never   OTHER   Name(s) of People in [facility] ([address])  Initial Discharge Assessment       Primary Care Provider: No, Primary Doctor    Admission Diagnosis: Carcinoma of upper-inner quadrant of left breast in female, estrogen receptor negative [C50.212, Z17.1]  Breast cancer [C50.919]    Admission Date:  4/1/2024  Expected Discharge Date: 4/3/2024    Transition of Care Barriers: (P) None    Payor: AETNA / Plan: AET[employer] CHOICE POS / Product Type: Commercial /     Extended Emergency Contact Information  Primary Emergency Contact: Kayla Lin  Mobile Phone: [phone]  Relation: Mother  Secondary Emergency Contact: OMID GATES  Mobile Phone: [phone]  Relation: Sister    Discharge Plan A: (P) Home with family         [id number] - [place], [place] - [address]  [address]  [place] [address]  Phone: [phone] Fax: [phone]    [id number] [place] [facility] [address]  [address]  [place] [address]  Phone: [phone] Fax: [phone]    [facility]  [address]  [place] [address]  Phone: [phone] Fax: [phone]      Initial Assessment (most recent)       Adult Discharge Assessment - 04/02/24 1141          Discharge Assessment    Assessment Type Discharge Planning Assessment (P)      Confirmed/corrected address, phone number and insurance Yes (P)      Confirmed Demographics Correct on Facesheet (P)      Source of Information patient (P)      Communicated ANGELICA with patient/caregiver Date not available/Unable to determine (P)      People in Home other relative(s) (P)      Do you expect to return to your current living situation? Yes (P)      Prior to hospitilization cognitive status: Alert/Oriented (P)      Current cognitive status: Alert/Oriented (P)      Walking or Climbing Stairs Difficulty no (P)      Dressing/Bathing Difficulty no (P)      Equipment Currently Used at Home none (P)      Readmission within 30 days? No (P)      Patient currently being followed by outpatient case management? No (P)      Do you currently have service(s) that help you manage your care at home? No (P)      Do you take prescription medications? Yes (P)      Do you have any problems affording any of your prescribed medications? No (P)      Is the  patient taking medications as prescribed? yes (P)      How do you get to doctors appointments? agency (P)      Are you on dialysis? No (P)      Do you take coumadin? No (P)      Discharge Plan A Home with family (P)      DME Needed Upon Discharge  none (P)      Discharge Plan discussed with: Patient (P)      Transition of Care Barriers None (P)         Physical Activity    On average, how many days per week do you engage in moderate to strenuous exercise (like a brisk walk)? 0 days (P)      On average, how many minutes do you engage in exercise at this level? 0 min (P)         Financial Resource Strain    How hard is it for you to pay for the very basics like food, housing, medical care, and heating? Somewhat hard (P)         Housing Stability    In the last 12 months, was there a time when you were not able to pay the mortgage or rent on time? No (P)      In the last 12 months, how many places have you lived? 1 (P)      In the last 12 months, was there a time when you did not have a steady place to sleep or slept in a shelter (including now)? No (P)         Transportation Needs    In the past 12 months, has lack of transportation kept you from medical appointments or from getting medications? No (P)      In the past 12 months, has lack of transportation kept you from meetings, work, or from getting things needed for daily living? No (P)         Food Insecurity    Within the past 12 months, you worried that your food would run out before you got the money to buy more. Sometimes true (P)      Within the past 12 months, the food you bought just didn't last and you didn't have money to get more. Never true (P)         Stress    Do you feel stress - tense, restless, nervous, or anxious, or unable to sleep at night because your mind is troubled all the time - these days? To some extent (P)         Social Connections    In a typical week, how many times do you talk on the phone with family, friends, or neighbors? More  than three times a week (P)      How often do you get together with friends or relatives? Once a week (P)      How often do you attend Pentecostalism or Sabianism services? Never (P)      Do you belong to any clubs or organizations such as Pentecostalism groups, unions, fraternal or athletic groups, or school groups? No (P)      How often do you attend meetings of the clubs or organizations you belong to? Never (P)      Are you , , , , never , or living with a partner? Never  (P)         Alcohol Use    Q1: How often do you have a drink containing alcohol? 2-4 times a month (P)      Q2: How many drinks containing alcohol do you have on a typical day when you are drinking? 1 or 2 (P)      Q3: How often do you have six or more drinks on one occasion? Never (P)         OTHER    Name(s) of People in Home Snady Melgoza (P)

## 2024-04-02 NOTE — PLAN OF CARE
Problem: Adult Inpatient Plan of Care  Goal: Plan of Care Review  Outcome: Ongoing, Progressing  Goal: Patient-Specific Goal (Individualized)  Outcome: Ongoing, Progressing  Goal: Absence of Hospital-Acquired Illness or Injury  Outcome: Ongoing, Progressing  Goal: Optimal Comfort and Wellbeing  Outcome: Ongoing, Progressing  Goal: Readiness for Transition of Care  Outcome: Ongoing, Progressing     Problem: Infection  Goal: Absence of Infection Signs and Symptoms  Outcome: Ongoing, Progressing     Problem: Fall Injury Risk  Goal: Absence of Fall and Fall-Related Injury  Outcome: Ongoing, Progressing     POC reviewed with patient. Patient verbalized understanding. AAOX4. VSS stable. Pain managed per scheduled and PRN meds per orders. IV NS infusing at 100 mL/hr per order. Patient is on room air. Choi removed. Patient has until 1200 to void. Call bell left within reach. Bed lowered and wheels locked. Patient free of falls and injury.

## 2024-04-02 NOTE — PLAN OF CARE
Problem: Adult Inpatient Plan of Care  Goal: Plan of Care Review  Outcome: Ongoing, Progressing  Goal: Patient-Specific Goal (Individualized)  Outcome: Ongoing, Progressing  Goal: Absence of Hospital-Acquired Illness or Injury  Outcome: Ongoing, Progressing  Goal: Optimal Comfort and Wellbeing  Outcome: Ongoing, Progressing  POC reviewed with pt and family at bedside. A&Ox4. Room air. Ambulation encouraged. PRN medication administered for pain. Safety checks performed. Bed in lowest position. Wheels locked. Call light in reach. WCTM.

## 2024-04-03 VITALS
OXYGEN SATURATION: 96 % | HEART RATE: 88 BPM | DIASTOLIC BLOOD PRESSURE: 71 MMHG | BODY MASS INDEX: 33.99 KG/M2 | HEIGHT: 61 IN | WEIGHT: 180 LBS | SYSTOLIC BLOOD PRESSURE: 114 MMHG | RESPIRATION RATE: 18 BRPM | TEMPERATURE: 98 F

## 2024-04-03 PROCEDURE — 94761 N-INVAS EAR/PLS OXIMETRY MLT: CPT

## 2024-04-03 PROCEDURE — 25000003 PHARM REV CODE 250: Performed by: STUDENT IN AN ORGANIZED HEALTH CARE EDUCATION/TRAINING PROGRAM

## 2024-04-03 PROCEDURE — 94799 UNLISTED PULMONARY SVC/PX: CPT

## 2024-04-03 RX ORDER — METHOCARBAMOL 500 MG/1
1000 TABLET, FILM COATED ORAL 3 TIMES DAILY
Qty: 60 TABLET | Refills: 0 | Status: SHIPPED | OUTPATIENT
Start: 2024-04-03 | End: 2024-04-13

## 2024-04-03 RX ORDER — IBUPROFEN 600 MG/1
600 TABLET ORAL 3 TIMES DAILY
Qty: 30 TABLET | Refills: 0 | Status: SHIPPED | OUTPATIENT
Start: 2024-04-03 | End: 2024-04-13

## 2024-04-03 RX ORDER — ACETAMINOPHEN 325 MG/1
650 TABLET ORAL EVERY 6 HOURS
Qty: 80 TABLET | Refills: 0 | Status: SHIPPED | OUTPATIENT
Start: 2024-04-03 | End: 2024-04-13

## 2024-04-03 RX ORDER — OXYCODONE HYDROCHLORIDE 5 MG/1
5 TABLET ORAL EVERY 4 HOURS PRN
Qty: 12 TABLET | Refills: 0 | Status: SHIPPED | OUTPATIENT
Start: 2024-04-03 | End: 2024-04-10

## 2024-04-03 RX ORDER — GABAPENTIN 300 MG/1
300 CAPSULE ORAL 3 TIMES DAILY
Qty: 30 CAPSULE | Refills: 0 | Status: SHIPPED | OUTPATIENT
Start: 2024-04-03 | End: 2024-05-17

## 2024-04-03 RX ADMIN — OXYCODONE 5 MG: 5 TABLET ORAL at 08:04

## 2024-04-03 RX ADMIN — ACETAMINOPHEN 650 MG: 325 TABLET, FILM COATED ORAL at 06:04

## 2024-04-03 RX ADMIN — METHOCARBAMOL 1000 MG: 500 TABLET ORAL at 06:04

## 2024-04-03 RX ADMIN — GABAPENTIN 300 MG: 300 CAPSULE ORAL at 08:04

## 2024-04-03 RX ADMIN — DOCUSATE SODIUM 100 MG: 100 CAPSULE, LIQUID FILLED ORAL at 08:04

## 2024-04-03 RX ADMIN — IBUPROFEN 600 MG: 600 TABLET, FILM COATED ORAL at 06:04

## 2024-04-03 RX ADMIN — IBUPROFEN 600 MG: 600 TABLET, FILM COATED ORAL at 12:04

## 2024-04-03 RX ADMIN — OXYCODONE 5 MG: 5 TABLET ORAL at 04:04

## 2024-04-03 RX ADMIN — OXYCODONE 5 MG: 5 TABLET ORAL at 12:04

## 2024-04-03 RX ADMIN — ACETAMINOPHEN 650 MG: 325 TABLET, FILM COATED ORAL at 12:04

## 2024-04-03 NOTE — NURSING
Patient took shower, teaching given from nurse. Drain care completed and patient is aware on how to completed. After shower patient c/o dizziness. BP 98/56. Patient back in bed and will follow up with blood pressure

## 2024-04-03 NOTE — NURSING
Report received from off going nurse. Patient AAO. No signs of distress noted. Flap check completed. MD removed dopplers this am. Call light in reach. Bed low and locked. Will continue plan of care.

## 2024-04-03 NOTE — PLAN OF CARE
Patient will discharge home. Appointments scheduled and added to AVS. Patient family to transport patient home. All CM needs have been met.    04/03/24 1140   Final Note   Assessment Type Final Discharge Note   Anticipated Discharge Disposition Home   Hospital Resources/Appts/Education Provided Provided patient/caregiver with written discharge plan information;Appointments scheduled and added to AVS   Post-Acute Status   Discharge Delays None known at this time     [facility] ([address])  Discharge Final Note    Primary Care Provider: No, Primary Doctor    Expected Discharge Date: 4/3/2024    Final Discharge Note (most recent)       Final Note - 04/03/24 1140          Final Note    Assessment Type Final Discharge Note (P)      Anticipated Discharge Disposition Home or Self Care (P)      Hospital Resources/Appts/Education Provided Provided patient/caregiver with written discharge plan information;Appointments scheduled and added to AVS (P)         Post-Acute Status    Discharge Delays None known at this time (P)                      Important Message from Medicare

## 2024-04-03 NOTE — NURSING
Patient is discharged. IV removed, tip intact. Discharge instructions given and understood along with drain care instructions and drainage output sheet provided. Extras bra and ABD pads given to patient. Pharmacy delivered meds to bedside

## 2024-04-03 NOTE — PROGRESS NOTES
Plastic and Reconstructive Surgery   Progress Note    Subjective:  sosa removed and diet advanced this AM doing well       Objective:  Vital signs in last 24 hours:  Temp:  [98 °F (36.7 °C)-98.8 °F (37.1 °C)] 98.5 °F (36.9 °C)  Pulse:  [77-89] 77  Resp:  [16-18] 18  SpO2:  [91 %-96 %] 96 %  BP: (104-137)/(68-89) 107/69    Intake/Output last 3 shifts:  I/O last 3 completed shifts:  In: 4950.1 [P.O.:360; I.V.:2540.3; IV Piggyback:2049.8]  Out: 4560.5 [Urine:4378; Drains:182.5]    Intake/Output this shift:  I/O this shift:  In: 0   Out: 46 [Drains:46]        Physical Exam:  VITAL SIGNS:   Vitals:    24 0408 24 0423 24 0438 24 0500   BP:   107/69    BP Location:   Right arm    Patient Position:   Lying    Pulse:   77    Resp: 16 16 18    Temp:   98.5 °F (36.9 °C)    TempSrc:   Oral    SpO2:   (!) 91% 96%   Weight:       Height:         TMAX: Temp (24hrs), Av.5 °F (36.9 °C), Min:98 °F (36.7 °C), Max:98.8 °F (37.1 °C)    General: Alert; No acute distress  Cardiovascular: Regular rate   Respiratory: Normal respiratory effort. Chest rise symmetric.   Abdomen: Soft, nontender, nondistended  Extremity: Moves all extremities equally.  Neurologic: No focal deficit. Speech normal    Left breast s/p mastectomy with LETY flap in place, soft and no sign of bleeding, skin flap healthy, doppler signal strong, JAIRO drain SS  Abdominal incision with clean dressings and binder, JAIRO drains SS    Scheduled Medications acetaminophen, 650 mg, Q6H  docusate sodium, 100 mg, Q12H  enoxparin, 40 mg, Daily  gabapentin, 300 mg, TID  ibuprofen, 600 mg, Q6H  methocarbamoL, 1,000 mg, TID  mupirocin, , BID      PRN Medications ondansetron, oxyCODONE    Recent Labs:   Lab Results   Component Value Date    WBC 11.98 2024    HGB 9.2 (L) 2024    HCT 29.7 (L) 2024    MCV 80 (L) 2024     2024     Lab Results   Component Value Date    GLU 96 2024     2024    K 3.9 2024      01/12/2024    BUN 16 01/12/2024         Assessment: 33 y.o. y/o female 2 Days Post-Op s/p Procedure(s):  REMOVAL, IMPLANT, BREAST  RECONSTRUCTION, BREAST, USING LETY FREE FLAP Doing well postoperatively.    Plan  Continue q4hr flap checks  Choi removed  Pain control PRN  Ambulate as tolerated  Regular diet      Marco Antonio Montalvo MD- Fellow  Department of Plastic and Reconstructive Surgery  [phone] (office)

## 2024-04-03 NOTE — PROGRESS NOTES
Plastic and Reconstructive Surgery   Progress Note    Subjective:  no acute events overnight, ambulating well      Objective:  Vital signs in last 24 hours:  Temp:  [98 °F (36.7 °C)-98.8 °F (37.1 °C)] 98.5 °F (36.9 °C)  Pulse:  [77-89] 77  Resp:  [16-18] 18  SpO2:  [91 %-96 %] 96 %  BP: (104-137)/(68-89) 107/69    Intake/Output last 3 shifts:  I/O last 3 completed shifts:  In: 4950.1 [P.O.:360; I.V.:2540.3; IV Piggyback:2049.8]  Out: 4560.5 [Urine:4378; Drains:182.5]    Intake/Output this shift:  I/O this shift:  In: 0   Out: 46 [Drains:46]        Physical Exam:  VITAL SIGNS:   Vitals:    24 0408 24 0423 24 0438 24 0500   BP:   107/69    BP Location:   Right arm    Patient Position:   Lying    Pulse:   77    Resp: 16 16 18    Temp:   98.5 °F (36.9 °C)    TempSrc:   Oral    SpO2:   (!) 91% 96%   Weight:       Height:         TMAX: Temp (24hrs), Av.5 °F (36.9 °C), Min:98 °F (36.7 °C), Max:98.8 °F (37.1 °C)    General: Alert; No acute distress  Cardiovascular: Regular rate   Respiratory: Normal respiratory effort. Chest rise symmetric.   Abdomen: Soft, nontender, nondistended  Extremity: Moves all extremities equally.  Neurologic: No focal deficit. Speech normal    Left breast s/p mastectomy with LETY flap in place, soft and no sign of bleeding, skin flap healthy, doppler signal strong, JAIRO drain SS  Abdominal incision with clean dressings and binder, JAIRO drains SS    Scheduled Medications acetaminophen, 650 mg, Q6H  docusate sodium, 100 mg, Q12H  enoxparin, 40 mg, Daily  gabapentin, 300 mg, TID  ibuprofen, 600 mg, Q6H  methocarbamoL, 1,000 mg, TID  mupirocin, , BID      PRN Medications ondansetron, oxyCODONE    Recent Labs:   Lab Results   Component Value Date    WBC 11.98 2024    HGB 9.2 (L) 2024    HCT 29.7 (L) 2024    MCV 80 (L) 2024     2024     Lab Results   Component Value Date    GLU 96 2024     2024    K 3.9 2024      01/12/2024    BUN 16 01/12/2024         Assessment: 33 y.o. y/o female 2 Days Post-Op s/p Procedure(s):  REMOVAL, IMPLANT, BREAST  RECONSTRUCTION, BREAST, USING LETY FREE FLAP Doing well postoperatively.    Plan  Dc home later today  Okay to shower  Doppler removed  Ambulate as tolerated  Regular diet  Pain control PRN  Lovenox for DVT ppx       Marco Antonio Montalvo MD- Fellow  Department of Plastic and Reconstructive Surgery  [phone] (office)

## 2024-04-03 NOTE — DISCHARGE INSTRUCTIONS
Plastic Surgery Discharge Instructions  Keyshawn Street DO    Wound Care  1. Shower daily beginning today after surgery  2. Okay to let warm soapy water run over the wound at that time  3. Do not submerge wounds in the bath  4. Leave any skin glue or mesh tape in place    Drain Care  If you have drains, strip tubing and record output when drain bulb becomes half full, or at least twice daily  Record output for each drain and bring to our follow up appointment    Diet  Regular Diet    Activity  Light activity only - no lifting greater than 10 lbs  Avoid strenuous activity that would cause you to get hot or sweaty    Medications  You have been given a prescription for narcotic pain medication, anti-inflammatory pain, muscle relaxer, and nerve pain  Unless otherwise contraindicated by your doctor, take 2 extra strength Tylenol and 600mg of ibuprofen every 8 hours  Please take medications as prescribed     What to call for:  1. Sustained fever > 101.0  2. Changes in color, sensation, temperature or pain at surgical site  3. Redness and/or drainage from the surgical site  4. Any reaction to prescribed medications  5. Continuous bloody drainage from surgical drains  6. Wound vac malfunction  7. Questions related to the procedure    Follow-up  1. Please call [phone] to schedule or confirm your follow up visit at the [facility], [address]  2. Call with any questions or concerns.  2. After hours call [phone] - ask to speak with the Plastic Surgery fellow on call

## 2024-04-03 NOTE — DISCHARGE SUMMARY
[facility] ([address])  Discharge Summary      Admit Date: 4/1/2024    Discharge Date and Time:  04/03/2024 6:46 AM    Attending Physician: Mehran Street DO     Reason for Admission: postop monitoring     Procedures Performed: Procedure(s) (LRB):  REMOVAL, IMPLANT, BREAST (Left)  RECONSTRUCTION, BREAST, USING LETY FREE FLAP (Left)    Hospital Course (synopsis of major diagnoses, care, treatment, and services provided during the course of the hospital stay): Patient admitted s/p L LETY flap for flap monitoring and remained inpatient for flap checks, once stable then cleared for discharge home      Goals of Care Treatment Preferences:  Code Status: Full Code      Consults: none    Significant Diagnostic Studies: N/A    Final Diagnoses:    Principal Problem: <principal problem not specified>   Secondary Diagnoses: There are no hospital problems to display for this patient.     Discharged Condition: stable    Disposition: Home or Self Care    Follow Up/Patient Instructions:     Medications:  Reconciled Home Medications:      Medication List        START taking these medications      acetaminophen 325 MG tablet  Commonly known as: TYLENOL  Take 2 tablets (650 mg total) by mouth every 6 (six) hours. for 10 days     gabapentin 300 MG capsule  Commonly known as: NEURONTIN  Take 1 capsule (300 mg total) by mouth 3 (three) times daily. for 10 days     ibuprofen 600 MG tablet  Commonly known as: ADVIL,MOTRIN  Take 1 tablet (600 mg total) by mouth 3 (three) times daily. for 10 days     methocarbamoL 1,000 mg Tab  Take 1,000 mg by mouth 3 (three) times daily. for 10 days     oxyCODONE 5 MG immediate release tablet  Commonly known as: ROXICODONE  Take 1 tablet (5 mg total) by mouth every 4 (four) hours as needed for Pain.            CONTINUE taking these medications      INV albuterol 90 mcg inhalation  Inhale 2 puffs into the lungs as needed (PRN wheeze). 2 puffs inhaled PRN     IRON ORAL  Take by mouth as  needed.     ZYRTEC ORAL  Take by mouth once daily at 6am.            No discharge procedures on file.

## 2024-04-09 ENCOUNTER — OFFICE VISIT (OUTPATIENT)
Dept: PLASTIC SURGERY | Facility: CLINIC | Age: 34
End: 2024-04-09
Payer: COMMERCIAL

## 2024-04-09 VITALS
OXYGEN SATURATION: 99 % | DIASTOLIC BLOOD PRESSURE: 71 MMHG | WEIGHT: 179.88 LBS | HEART RATE: 89 BPM | BODY MASS INDEX: 33.99 KG/M2 | SYSTOLIC BLOOD PRESSURE: 114 MMHG

## 2024-04-09 DIAGNOSIS — Z09 SURGERY FOLLOW-UP: Primary | ICD-10-CM

## 2024-04-09 PROCEDURE — 99999 PR PBB SHADOW E&M-EST. PATIENT-LVL III: CPT | Mod: PBBFAC,,, | Performed by: SURGERY

## 2024-04-09 PROCEDURE — 99024 POSTOP FOLLOW-UP VISIT: CPT | Mod: S$GLB,,, | Performed by: SURGERY

## 2024-04-09 NOTE — PROGRESS NOTES
Alisha Cardenas femalepresents to Plastic Surgery Clinic for a follow up visit status post left LETY flap reconstruction on 4/1/24. Patient is doing well today with no issues since hospital discharge. She is happy with the surgical outcome.      PHYSICAL EXAMINATION  Left breast flap incision(s) well approximated with no issues.   Flap is soft   Nipple(s) is/are well perfused   Abdominal incision is healing well with no issues   Umbilical incision healing well with no issues   Drains are in place with low output       ASSESSMENT/PLAN  Alisha Cardenas is s/p delayed left LETY flap  - Breast and left abdominal drain removed. Right abd JAIRO drain remains in place.  - Continue abdominal compression  - Follow up 1 week      All questions were answered. The patient was advised to contact the clinic with any questions or concerns prior to their next visit.       Nissa Adhikari PA-C  Plastic and Reconstructive Surgery

## 2024-04-16 ENCOUNTER — OFFICE VISIT (OUTPATIENT)
Dept: PLASTIC SURGERY | Facility: CLINIC | Age: 34
End: 2024-04-16
Payer: COMMERCIAL

## 2024-04-16 VITALS
OXYGEN SATURATION: 99 % | DIASTOLIC BLOOD PRESSURE: 71 MMHG | SYSTOLIC BLOOD PRESSURE: 114 MMHG | RESPIRATION RATE: 19 BRPM

## 2024-04-16 DIAGNOSIS — C50.212 CARCINOMA OF UPPER-INNER QUADRANT OF LEFT BREAST IN FEMALE, ESTROGEN RECEPTOR NEGATIVE: Primary | ICD-10-CM

## 2024-04-16 DIAGNOSIS — Z17.1 CARCINOMA OF UPPER-INNER QUADRANT OF LEFT BREAST IN FEMALE, ESTROGEN RECEPTOR NEGATIVE: Primary | ICD-10-CM

## 2024-04-16 PROCEDURE — 99024 POSTOP FOLLOW-UP VISIT: CPT | Mod: S$GLB,,, | Performed by: SURGERY

## 2024-04-16 PROCEDURE — 99999 PR PBB SHADOW E&M-EST. PATIENT-LVL III: CPT | Mod: PBBFAC,,, | Performed by: SURGERY

## 2024-04-16 NOTE — PROGRESS NOTES
Alisha Cardenas femalepresents to Plastic Surgery Clinic for a follow up visit status post left LETY flap reconstruction on 4/1/24. She continues to average 40ccs/day right abdominal JAIRO drain output. She started walking for exercise about an hour at a time. She hasn't been wearing abdominal compression during her walks. The left breast is still a little swollen.      PHYSICAL EXAMINATION  Left breast flap incision(s) well approximated with a small area of superficial breakdown at the IMF incision.   Flap is soft   Nipple(s) is/are well perfused   Abdominal incision is healing well with no issues   Umbilical incision healing well with no issues   Drains are in place with low output       ASSESSMENT/PLAN  Alisha Cardenas is s/p left LETY flap  - Abdominal drain still with moderate output but not wearing compression all the time.   - Recommend moisturizing skin. Counseled on scar care and scar massage.  - Follow up 1 week for drain check      All questions were answered. The patient was advised to contact the clinic with any questions or concerns prior to their next visit.       Nissa Adhikari PA-C  Plastic and Reconstructive Surgery

## 2024-04-23 ENCOUNTER — OFFICE VISIT (OUTPATIENT)
Dept: PLASTIC SURGERY | Facility: CLINIC | Age: 34
End: 2024-04-23
Payer: COMMERCIAL

## 2024-04-23 VITALS
SYSTOLIC BLOOD PRESSURE: 114 MMHG | WEIGHT: 179.88 LBS | BODY MASS INDEX: 33.99 KG/M2 | DIASTOLIC BLOOD PRESSURE: 71 MMHG | HEART RATE: 90 BPM

## 2024-04-23 DIAGNOSIS — Z09 SURGERY FOLLOW-UP: Primary | ICD-10-CM

## 2024-04-23 PROCEDURE — 99024 POSTOP FOLLOW-UP VISIT: CPT | Mod: S$GLB,,, | Performed by: SURGERY

## 2024-04-23 PROCEDURE — 99999 PR PBB SHADOW E&M-EST. PATIENT-LVL III: CPT | Mod: PBBFAC,,, | Performed by: SURGERY

## 2024-04-23 NOTE — PROGRESS NOTES
Alisha Cardenas presents to Plastic Surgery Clinic for a follow up visit status post left LETY flap reconstruction on 4/1/24. Patient is doing well today with no issues since her last visit. She has decreased abdominal JAIRO drain output.       PHYSICAL EXAMINATION  Left breast flap incision(s) well healing with no issues.   Flap is soft   Nipple is well perfused   Abdominal incision is healing well with no issues   Umbilical incision healing well with no issues   Drains are in place with low output       ASSESSMENT/PLAN  Alisha Cardenas is s/p left LETY flap  - JAIRO drain removed  - Recommend abdominal compression day and night for another week, then during the day for 4 more weeks  - Counseled on scar care and scar massage  - Follow up 3 months      All questions were answered. The patient was advised to contact the clinic with any questions or concerns prior to their next visit.       Nissa Adhikari PA-C  Plastic and Reconstructive Surgery

## 2024-04-29 ENCOUNTER — PATIENT MESSAGE (OUTPATIENT)
Dept: PLASTIC SURGERY | Facility: CLINIC | Age: 34
End: 2024-04-29
Payer: COMMERCIAL

## 2024-04-30 ENCOUNTER — TELEPHONE (OUTPATIENT)
Dept: PLASTIC SURGERY | Facility: CLINIC | Age: 34
End: 2024-04-30
Payer: COMMERCIAL

## 2024-04-30 ENCOUNTER — OFFICE VISIT (OUTPATIENT)
Dept: PLASTIC SURGERY | Facility: CLINIC | Age: 34
End: 2024-04-30
Payer: COMMERCIAL

## 2024-04-30 DIAGNOSIS — C50.212 CARCINOMA OF UPPER-INNER QUADRANT OF LEFT BREAST IN FEMALE, ESTROGEN RECEPTOR NEGATIVE: Primary | ICD-10-CM

## 2024-04-30 DIAGNOSIS — Z17.1 CARCINOMA OF UPPER-INNER QUADRANT OF LEFT BREAST IN FEMALE, ESTROGEN RECEPTOR NEGATIVE: Primary | ICD-10-CM

## 2024-04-30 PROCEDURE — 99999 PR PBB SHADOW E&M-EST. PATIENT-LVL II: CPT | Mod: PBBFAC,,, | Performed by: SURGERY

## 2024-04-30 PROCEDURE — 99024 POSTOP FOLLOW-UP VISIT: CPT | Mod: S$GLB,,, | Performed by: SURGERY

## 2024-04-30 NOTE — PROGRESS NOTES
Alisha Cardenas presents to Plastic Surgery Clinic for a follow up visit status post left LETY flap on 4/1/24. She reached out to the clinic with concern about abdominal fullness since her JAIRO drain was removed. She was worried about a seroma forming.      PHYSICAL EXAMINATION  Left breast incision(s) well healed with no issues.   Nipple(s) is/are well perfused   Drains are not present   Abdomen is soft. No appreciable fluid collection or fluid wave.      ASSESSMENT/PLAN  Alisha Cardenas is s/p left LETY flap  - Abdomen is healing well. No seroma on exam. Continue compression.  - Follow up as planned 3 mo      All questions were answered. The patient was advised to contact the clinic with any questions or concerns prior to their next visit.       Nissa Adhikari PA-C  Plastic and Reconstructive Surgery

## 2024-04-30 NOTE — TELEPHONE ENCOUNTER
Called pt in response to inbasket message - pt w c/o abdominal fluid - advised pt to be seen today - appt made toady 1:30 At [facility] - pt agreeable

## 2024-05-02 ENCOUNTER — OFFICE VISIT (OUTPATIENT)
Dept: OBSTETRICS AND GYNECOLOGY | Facility: CLINIC | Age: 34
End: 2024-05-02
Payer: COMMERCIAL

## 2024-05-02 VITALS
HEIGHT: 61 IN | BODY MASS INDEX: 31.72 KG/M2 | HEART RATE: 79 BPM | WEIGHT: 168 LBS | SYSTOLIC BLOOD PRESSURE: 116 MMHG | DIASTOLIC BLOOD PRESSURE: 81 MMHG

## 2024-05-02 DIAGNOSIS — Z98.890 STATUS POST MYOMECTOMY: ICD-10-CM

## 2024-05-02 DIAGNOSIS — C50.212 CARCINOMA OF UPPER-INNER QUADRANT OF LEFT BREAST IN FEMALE, ESTROGEN RECEPTOR NEGATIVE: ICD-10-CM

## 2024-05-02 DIAGNOSIS — Z01.419 ENCOUNTER FOR WELL WOMAN EXAM WITH ROUTINE GYNECOLOGICAL EXAM: Primary | ICD-10-CM

## 2024-05-02 DIAGNOSIS — Z11.3 SCREEN FOR STD (SEXUALLY TRANSMITTED DISEASE): ICD-10-CM

## 2024-05-02 DIAGNOSIS — Z17.1 CARCINOMA OF UPPER-INNER QUADRANT OF LEFT BREAST IN FEMALE, ESTROGEN RECEPTOR NEGATIVE: ICD-10-CM

## 2024-05-02 PROCEDURE — 87491 CHLMYD TRACH DNA AMP PROBE: CPT | Performed by: NURSE PRACTITIONER

## 2024-05-02 PROCEDURE — 99999 PR PBB SHADOW E&M-EST. PATIENT-LVL IV: CPT | Mod: PBBFAC,,, | Performed by: NURSE PRACTITIONER

## 2024-05-02 PROCEDURE — 99395 PREV VISIT EST AGE 18-39: CPT | Mod: S$GLB,,, | Performed by: NURSE PRACTITIONER

## 2024-05-02 NOTE — PROGRESS NOTES
"Chief Complaint: Well Woman Exam     HPI:      Alisha Cardenas is a 33 y.o.  who presents today for well woman exam.  LMP: Patient's last menstrual period was 2024 (exact date).   Patient is currently sexually active. She is currently using no method for contraception. She would like STD screening today. Ms. Cardenas confirms that she is safe at home.  Ms. Cardenas denies abnormal vaginal bleeding, discharge, pelvic pain, urinary problems, or changes in appetite.  Menses: Monthly. Denies BTB    Of note, patient had total Left mastectomy for  Stage IIB (cT2, cN0, cM0, G3, ER-, DE-, HER2-) on 2024. She declined adjuvant chemotherapy and radiation. Will need yearly right mammograms.     She is inquiring about hormones and whether she can take OCP if she chooses.     Previous Pap:   NILM/HPV neg  (2023) Denies hx of abnormal paps    Gardasil:has never had     Family History   Problem Relation Name Age of Onset    No Known Problems Mother      No Known Problems Father      Vaginal cancer Neg Hx      Endometrial cancer Neg Hx      Cervical cancer Neg Hx       OB History          3    Para        Term                AB   3    Living             SAB        IAB        Ectopic        Multiple        Live Births                     ROS:     GENERAL: Denies unintentional weight gain or weight loss. Feeling well overall.   BREASTS: Denies pain, lumps, or nipple discharge.   ABDOMEN: Denies abdominal pain, constipation, diarrhea, nausea, vomiting, change in appetite.  URINARY: Denies frequency, dysuria, hematuria.  PSYCHIATRIC: Denies depression, anxiety or mood swings.    Physical Exam:      PHYSICAL EXAM:  /81   Pulse 79   Ht 5' 1" (1.549 m)   Wt 76.2 kg (167 lb 15.9 oz)   LMP 2024 (Exact Date)   BMI 31.74 kg/m²   Body mass index is 31.74 kg/m².     APPEARANCE: Well nourished, well developed, in no acute distress.  PSYCH: Appropriate mood and affect.  ABDOMEN: Soft.  No tenderness " or masses.    BREASTS: Symmetrical, no skin changes or visible lesions.  No palpable masses or nipple discharge bilaterally.  PELVIC: Normal external genitalia without lesions.  Normal hair distribution.  Adequate perineal body, normal urethral meatus.  Vagina moist and well rugated without lesions or discharge.  Cervix pink, without lesions, discharge or tenderness.  No significant cystocele or rectocele.  Bimanual exam shows uterus to be normal size, regular, mobile and nontender.  Adnexa without masses or tenderness.      Assessment/Plan:     Encounter for well woman exam with routine gynecological exam    Status post myomectomy  -     US Pelvis Comp with Transvag NON-OB (xpd; Future; Expected date: 05/02/2024    Screen for STD (sexually transmitted disease)  -     C. trachomatis/N. gonorrhoeae by AMP DNA; Future; Expected date: 05/02/2024  -     Hepatitis Panel, Acute; Future; Expected date: 05/02/2024  -     HIV 1/2 Ag/Ab (4th Gen); Future; Expected date: 05/02/2024  -     Treponema Pallidium Antibodies IgG, IgM; Future; Expected date: 05/02/2024    Carcinoma of upper-inner quadrant of left breast in female, estrogen receptor negative  -     Ambulatory referral/consult to [facility]; Future; Expected date: 05/24/2024        Counseling:     Patient was counseled today on current ASCCP pap guidelines, the recommendation for yearly pelvic exams, healthy diet and exercise routines, breast self awareness.She is to see her PCP for other health maintenance.     WIll refer to survivorship clinic

## 2024-05-03 LAB
C TRACH DNA SPEC QL NAA+PROBE: NOT DETECTED
N GONORRHOEA DNA SPEC QL NAA+PROBE: NOT DETECTED

## 2024-05-07 ENCOUNTER — CLINICAL SUPPORT (OUTPATIENT)
Dept: NUTRITION | Facility: CLINIC | Age: 34
End: 2024-05-07
Payer: COMMERCIAL

## 2024-05-07 ENCOUNTER — PATIENT MESSAGE (OUTPATIENT)
Dept: NUTRITION | Facility: CLINIC | Age: 34
End: 2024-05-07

## 2024-05-07 DIAGNOSIS — Z17.1 CARCINOMA OF UPPER-INNER QUADRANT OF LEFT BREAST IN FEMALE, ESTROGEN RECEPTOR NEGATIVE: Primary | ICD-10-CM

## 2024-05-07 DIAGNOSIS — C50.212 CARCINOMA OF UPPER-INNER QUADRANT OF LEFT BREAST IN FEMALE, ESTROGEN RECEPTOR NEGATIVE: Primary | ICD-10-CM

## 2024-05-07 PROCEDURE — 97803 MED NUTRITION INDIV SUBSEQ: CPT | Mod: 95,,,

## 2024-05-07 NOTE — PATIENT INSTRUCTIONS
Recommendations:   Encouraged increased protein intake. Discussed protein source at each meal  Review MyPlate and goal of Protein/Vegetable/Fiber-Filled Grain at each meal

## 2024-05-07 NOTE — PROGRESS NOTES
The patient location is: [place]  The chief complaint leading to consultation is: breast cancer, 1 month surgery follow up     Visit type: audiovisual- switched from [employer] video call to phone call due to poor sound quality and background noise of the video format.     Face to Face time with patient: 30  45 minutes of total time spent on the encounter, which includes face to face time and non-face to face time preparing to see the patient (eg, review of tests), Obtaining and/or reviewing separately obtained history, Documenting clinical information in the electronic or other health record, Independently interpreting results (not separately reported) and communicating results to the patient/family/caregiver, or Care coordination (not separately reported).      Each patient to whom he or she provides medical services by telemedicine is:  (1) informed of the relationship between the physician and patient and the respective role of any other health care provider with respect to management of the patient; and (2) notified that he or she may decline to receive medical services by telemedicine and may withdraw from such care at any time.     Notes:     Oncology Nutrition Assessment Medical Nutrition Therapy Follow Up Visit     Alisha Chambers Ronald  1990     Referring Provider: vIy oPsada MD      Diagnosis: Carcinoma of upper-inner quadrant of left breast in female, estrogen receptor negative   Treatment: s/p total left mastectomy (24). She has met with plastic surgery and had an expander placed. Reconstruction surgery completed 24 with a flap. Declined upfront chemotherapy per Dr. Barney note.     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 female with Carcinoma of upper-inner quadrant of left breast in female, estrogen receptor negative referred by Dr. Posada for nutrition follow up post reconstruction. Noted that Ms. Cardenas has made changes to her diet and lifestyle since diagnosis in January. Ms. Cardenas continues to limit intake of meat except for fish (primarily salmon). She still cooks meals and reads ingredient labels and avoids fast food like at her prior nutrition visit. Gathering information health information tips and recipe ideas online and via social media. Requesting dietitian assistance on fact or fiction for benefits of wheatgrass to hormones and benefits of the B+ blood type diet.     Anthropometrics:   Weight:       Wt Readings from Last 3 Encounters:   24 76.2 kg (168 lb)   24 84.4 kg (186 lb 1.1 oz)   24 84.4 kg (186 lb)                                    Height: 61 inches  BMI: 31.7 Usual BW: N/A. Timeframe: N/A  Weight Change: N/A              Appetite/Intake: No meat except fish and avoiding dairy. Eating 3 meals daily. Enzyme bread.   Breakfast: Oat milk, frozen fruits, and 1-2 Tbsp pete seeds (6 gm of protein). Or egg sandwich with two eggs.   Lunch: rice with tomato and lettuce or salmon with broccoli  Dinner: [employer] burrito or salmon and cauliflower / broccoli  Snack: [employer] - crackers and cookies   Beverage: water, oat  milk    Current Medications:    Current Medications      Current Outpatient Medications:     albuterol sulfate (INV ALBUTEROL) 90 mcg inhalation, Inhale 2 puffs into the lungs as needed (PRN wheeze). 2 puffs inhaled PRN, Disp: , Rfl:     aspirin/caffeine (BC PAIN RELIEF ORAL), Take by mouth as needed., Disp: , Rfl:     cetirizine HCl (ZYRTEC ORAL), Take by mouth once daily at 6am., Disp: , Rfl:     ferrous sulfate (IRON ORAL), Take by mouth as needed., Disp: , Rfl:     gabapentin (NEURONTIN) 300 MG capsule, Take 1 capsule (300 mg total) by mouth 3 (three) times daily. for 7 days, Disp: 21 capsule, Rfl: 0    multivit,calc,mins/iron/folic (WOMEN'S ONE DAILY ORAL), Take by mouth once daily., Disp: , Rfl:     oxyCODONE (ROXICODONE) 5 MG immediate release tablet, Take 1 tablet (5 mg total) by mouth every 4 (four) hours as needed for Pain., Disp: 10 tablet, Rfl: 0    traMADoL (ULTRAM) 50 mg tablet, Take 1 tablet (50 mg total) by mouth every 6 (six) hours as needed for Pain., Disp: 15 tablet, Rfl: 0        Labs: followed by MD s      Nutrition Diagnosis    Nutrition Problem: Food and nutrition related knowledge deficit  Etiology (related to): lack of prior need for nutrition education   Signs/Symptoms (as evidenced by): new cancer diagnosis     Nutrition Intervention    Nutrition Prescription  1900 kcals/day  25 kcal/kg    72-86 g protein/day 1-1.2 gm/kg  1900 mL fluid/day 1 ml/kcal     Recommendations:   Encouraged increased protein intake. Discussed protein source at each meal  Review MyPlate and goal of Protein/Vegetable/Fiber-Filled Grain at each meal     Materials Provided/Reviewed: Patient requested information on Foods for Specific Blood Type B+ and whether wheatgrass would help her hormone balance. Message sent about the basics of the blood type diet and encouraged rotating wheatgrass with other green vegetables like cabbage, spinach and kale.     Nutrition Monitoring and Evaluation    Monitor: diet education needs  , symptom management , and adherence to nutrition recommendations      Follow up Patient will follow up in portal as needed.     Communication to referring provider/care team: Note available in chart.      Consultation Time: 30 Minutes    Signature: Maggie Moreno, MPH, RD, LDN

## 2024-05-16 ENCOUNTER — OFFICE VISIT (OUTPATIENT)
Dept: HEMATOLOGY/ONCOLOGY | Facility: CLINIC | Age: 34
End: 2024-05-16
Payer: COMMERCIAL

## 2024-05-16 VITALS
HEART RATE: 101 BPM | HEIGHT: 61 IN | OXYGEN SATURATION: 97 % | DIASTOLIC BLOOD PRESSURE: 84 MMHG | BODY MASS INDEX: 32.59 KG/M2 | SYSTOLIC BLOOD PRESSURE: 114 MMHG | RESPIRATION RATE: 18 BRPM | WEIGHT: 172.63 LBS

## 2024-05-16 DIAGNOSIS — Z17.1 CARCINOMA OF UPPER-INNER QUADRANT OF LEFT BREAST IN FEMALE, ESTROGEN RECEPTOR NEGATIVE: Primary | ICD-10-CM

## 2024-05-16 DIAGNOSIS — C50.212 CARCINOMA OF UPPER-INNER QUADRANT OF LEFT BREAST IN FEMALE, ESTROGEN RECEPTOR NEGATIVE: Primary | ICD-10-CM

## 2024-05-16 PROCEDURE — 99999 PR PBB SHADOW E&M-EST. PATIENT-LVL III: CPT | Mod: PBBFAC,,, | Performed by: STUDENT IN AN ORGANIZED HEALTH CARE EDUCATION/TRAINING PROGRAM

## 2024-05-16 PROCEDURE — 99215 OFFICE O/P EST HI 40 MIN: CPT | Mod: S$GLB,,, | Performed by: STUDENT IN AN ORGANIZED HEALTH CARE EDUCATION/TRAINING PROGRAM

## 2024-05-16 PROCEDURE — G2211 COMPLEX E/M VISIT ADD ON: HCPCS | Mod: S$GLB,,, | Performed by: STUDENT IN AN ORGANIZED HEALTH CARE EDUCATION/TRAINING PROGRAM

## 2024-05-16 NOTE — PROGRESS NOTES
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 Her oncologic history is as follows:    -Presented w palpable L breast mass that she first noticed around [facility]. Diagnostic MMG 23 reveals a 34 x 33 x 32mm in the L breast at 10oclock. Enlarged axillary LN measuring 18 x 15 x 14mm. No concerning Rt breast findings. Subsequent biopsy showing poorly differentiated invasive carcinoma, grade 3. ER negative, KY negative, Her2 0, Ki67 >90%. LN negative for metastatic carcinoma.   -MRI breast 1/10/23 revealed left breast upper inner mass measuring 4.7 cm craniocaudal x 4.6 cm AP x 4.0 cm   -s/p genetic testing that was negative for clinically significant mutation; VUS in CTNNA1  - 24 Underwent total left mastectomy. 5.5cm grade 3 IDC. Closest margin 2mm on posterior aspect. 0/17 LN.  -met with radiation oncology; decided to forego adjuvant radiation  -2024 underwent L breast reconstruction    Denies significant pmh. FH notable for a cousin dx with breast cancer at 34yo and aunt with breast cancer. She lives in [place] with her 19yo niece. Works in childcare.     Gyn History:   Menarche: 8yo  Menopause: N/A LMP 2023     : No  HRT: No    Interval History:  Patient presents to clinic alone today. She is feeling well with no new symptoms. She is s/p L breast reconstruction which has healed well. She inquires about mammography screening every 3 months as well as ultrasound screening to monitor her R breast for possible cancer. She also  "asks about hormonal birth control pills and whether they are safe for her.       Patient Active Problem List   Diagnosis    Acute anemia    Viral syndrome    S/P abdominal myomectomy    Carcinoma of upper-inner quadrant of left breast in female, estrogen receptor negative       Current Outpatient Medications   Medication Instructions    albuterol sulfate (INV ALBUTEROL) 90 mcg inhalation 2 puffs, Inhalation, As needed (PRN), 2 puffs inhaled PRN    cetirizine HCl (ZYRTEC ORAL) Oral, Daily    ferrous sulfate (IRON ORAL) Oral, As needed (PRN)    gabapentin (NEURONTIN) 300 mg, Oral, 3 times daily       Review of Systems:   Answers submitted by the patient for this visit:  Review of Systems Questionnaire (Submitted on 5/10/2024)  appetite change : No  unexpected weight change: No  mouth sores: No  visual disturbance: No  cough: No  shortness of breath: No  chest pain: No  abdominal pain: No  diarrhea: No  frequency: Yes  back pain: No  rash: No  headaches: No  adenopathy: No  nervous/ anxious: No      PHYSICAL EXAM:  /84   Pulse 101   Resp 18   Ht 5' 1" (1.549 m)   Wt 78.3 kg (172 lb 9.9 oz)   LMP 04/14/2024 (Exact Date)   SpO2 97%   BMI 32.62 kg/m²     ECOG 0  General: well appearing, in no apparent distress  HEENT: Normocephalic, EOMI, anicteric sclerae, MMM  Heart: well-perfused  Lungs: no increased wob  Breast: L breast reconstruction well-healed. No Rt breast masses  Abdomen: Soft, nontender, nondistended .  Extremities: No LE edema or joint effusion  Skin: warm, well-perfused, no rash  Neurologic: Alert and oriented x 4, normal speech and gait   Psychiatric: Conversing appropriately with providers throughout today's encounter.      Pertinent Labs & Imaging:  Reviewed all recent labs, imaging and pathology.     Assessment & Plan:  Alisha is a can 32yo woman with recently diagnosed at least Stage IIB TNBC (cT2N0) who presents today for evaluation.    She was previously conseled regarding " recommendations for her TNBC measuring 5.5cm, but she opted against chemotherapy and chose to undergo surgery upfront. Following her L mastectomy in February, she again opted to forego chemotherapy and radiation in favor of pursuing reconstruction sooner. Today she is s/p L breast reconstruction. We explained that she is outside of the optimal window to begin chemotherapy and recommended clinical monitoring, to which the patient agreed. She requested mammography screening every 3 months and asked about ultrasound screening on a similar schedule to monitor the right breast for cancer. We discussed pursuing a high-risk screening protocol of mammogram and breast MRI annually alternating every 6 months. We counseled her extensively about possible symptoms of recurrence, including that the presence of symptoms can vary widely depending on location. We also discussed hormonal birth control and advised that it might be best to avoid systemic hormone therapy if possible not for risk of TNBC recurrence, but rather de rodrigo HR+ breast cancer risk.     The patient reports she has met with radiation oncology and opted against radiation. We will continue to monitor clinically with mammogram and MRI yearly and clinic visits every 3-4 months.     #TNBC:   --S/p flap reconstruction on 4/1   --Will need yearly mammogram and breast MRI alternating every 6 months for high-risk screening protocol  --Will see patient in clinic every 3-4 months for the first 5 years after diagnosis    Donnie Jorgensen MD  [facility] PGY-2      Reviewed patients referring notes, imaging and pathology. Discussed diagnosis, staging, and treatment in detail with patient. Will see her back in 3mo or sooner should the need arise.     Route Chart for Scheduling    Med Onc Chart Routing      Follow up with physician 3 months.   Follow up with [facility]    Infusion scheduling note    Injection scheduling note    Labs None   Scheduling:  Preferred lab:  Lab  interval:     Imaging MRI   MMG scheduled in June; needs MRI in December   Pharmacy appointment No pharmacy appointment needed      Other referrals no referral to Oncology Primary Care needed -  no Massage appointment needed    No additional referrals needed                      I personally interviewed and examined this patient today with Dr. Jorgensen and agree with the assessment and plan set forth in her note. Alisha is now s/p L breast reconstruction. She previously opted out of chemotherapy or radiation. Today we discussed surveillance moving forward- will cont to see her q3-4 mo for clinical exam. Will also pursue high risk screening imaging for the Rt breast. Due for screening MMG in June and MRI breast in December. She was in agreement with the above plan.     Vannessa Barney MD     MDM includes  :    - Acute or chronic illness or injury that poses a threat to life or bodily function  - Review of prior external notes from unique source  - Independent review and explanation of 3+ results from unique tests  - Discussion of management and ordering 3+ unique tests  - Extensive discussion of treatment and management  - Prescription drug management  - Drug therapy requiring intensive monitoring for toxicity

## 2024-05-29 ENCOUNTER — TELEPHONE (OUTPATIENT)
Dept: OBSTETRICS AND GYNECOLOGY | Facility: CLINIC | Age: 34
End: 2024-05-29
Payer: COMMERCIAL

## 2024-05-29 NOTE — NURSING
Hx of LEFT TNBC, s/p left mastectomy, declined adjuvant chemoRT. Currently under surveillance with Dr. Posada and Dr. Leola Barney. Reconstruction w/ Dr. Mehran Street.    LMP 5/10/24 still having cycles  WWE UTD 24  Patient is     Continues Right Mammogram for surveillance, next 24.     Pelvic US, 24: Hx of hemorrhagic cysts, uterine fibroids, s/p myomectomy.     Inquires if she can use OCP's, desires to discuss HRT in setting of TNBC.    Denies VMS, insomnia, mood changes, low libido.     Virtual visit scheduled w/ confirmed access to see Dr. Hood on 24 at 1530, Hannah RN.

## 2024-06-04 ENCOUNTER — HOSPITAL ENCOUNTER (OUTPATIENT)
Dept: RADIOLOGY | Facility: HOSPITAL | Age: 34
Discharge: HOME | End: 2024-06-04
Attending: SURGERY
Payer: COMMERCIAL

## 2024-06-04 DIAGNOSIS — C50.212 CARCINOMA OF UPPER-INNER QUADRANT OF LEFT BREAST IN FEMALE, ESTROGEN RECEPTOR NEGATIVE: ICD-10-CM

## 2024-06-04 DIAGNOSIS — Z17.1 CARCINOMA OF UPPER-INNER QUADRANT OF LEFT BREAST IN FEMALE, ESTROGEN RECEPTOR NEGATIVE: ICD-10-CM

## 2024-06-04 PROCEDURE — 77065 DX MAMMO INCL CAD UNI: CPT | Mod: TC,RT

## 2024-06-04 PROCEDURE — 77061 BREAST TOMOSYNTHESIS UNI: CPT | Mod: TC,RT

## 2024-06-04 PROCEDURE — 77061 BREAST TOMOSYNTHESIS UNI: CPT | Mod: 26,RT,, | Performed by: RADIOLOGY

## 2024-06-04 PROCEDURE — 77065 DX MAMMO INCL CAD UNI: CPT | Mod: 26,RT,, | Performed by: RADIOLOGY

## 2024-07-23 ENCOUNTER — OFFICE VISIT (OUTPATIENT)
Dept: PLASTIC SURGERY | Facility: CLINIC | Age: 34
End: 2024-07-23
Payer: COMMERCIAL

## 2024-07-23 DIAGNOSIS — Z09 SURGERY FOLLOW-UP: Primary | ICD-10-CM

## 2024-07-23 PROCEDURE — 99024 POSTOP FOLLOW-UP VISIT: CPT | Mod: S$GLB,,, | Performed by: SURGERY

## 2024-07-23 PROCEDURE — 99999 PR PBB SHADOW E&M-EST. PATIENT-LVL II: CPT | Mod: PBBFAC,,, | Performed by: SURGERY

## 2024-07-23 NOTE — PROGRESS NOTES
Alisha Cardenas presents to [facility] for a follow up visit status post delayed left LETY flap breast reconstruction on 4/1/24.  Here today for routine follow up and second stage revision planning. Her complaints about her reconstruction include upper abdominal donor site fullness and asymmetry with her natural right breast.   She denies fever, chills, nausea, vomiting or other systemic signs of infection.       ROS - negative, other than stated above    PHYSICAL EXAMINATION  There were no vitals filed for this visit.    Gen: NAD, appears stated age  Neuro: normal without focal findings, mental status and speech normal  HEENT: NCAT, neck supple, PEERL  CV: RRR  Pulm: Breathing non-labored, chest wall movement equal bilaterally   Breast:  Left flap reconstruction is soft, nice full round breast. Nipple position is good.  Right breast grade 2 ptosis and upper pole deflation.  Abdomen: soft, nontender, no guarding. Upper abdominal and flank fullness.  Gu: genitalia not examined  Extremity:normal strength, no cyanosis or edema  Skin: Skin color, texture, turgor normal. No rashes or lesions  Psych: oriented to time, place and person      ASSESSMENT/PLAN  33 y.o. female s/p left LETY flap    Second stage revision recommendations discussed with patient including right mastopexy for symmetry, abdominal donor site lipo with fat grafting to right breast. She is agreeable to this. We also discussed tailoring the skin envelope over the flap but she elected not to do any major revisions of the flap.    All questions were answered. The patient was advised to contact the clinic with any questions or concerns prior to their next visit.   Look to schedule surgery in October and will see her back for a preop visit.    Nissa Adhikari PA-C  Plastic and Reconstructive Surgery      This includes face to face time and non-face to face time preparing to see the patient (eg, review of tests), obtaining and/or  reviewing separately obtained history, documenting clinical information in the electronic or other health record, independently interpreting results and communicating results to the patient/family/caregiver, or care coordinator.

## 2024-08-01 ENCOUNTER — OFFICE VISIT (OUTPATIENT)
Dept: OBSTETRICS AND GYNECOLOGY | Facility: CLINIC | Age: 34
End: 2024-08-01
Attending: OBSTETRICS & GYNECOLOGY
Payer: COMMERCIAL

## 2024-08-01 DIAGNOSIS — Z17.1 CARCINOMA OF UPPER-INNER QUADRANT OF LEFT BREAST IN FEMALE, ESTROGEN RECEPTOR NEGATIVE: ICD-10-CM

## 2024-08-01 DIAGNOSIS — Z30.09 ENCOUNTER FOR COUNSELING REGARDING CONTRACEPTION: Primary | ICD-10-CM

## 2024-08-01 DIAGNOSIS — C50.212 CARCINOMA OF UPPER-INNER QUADRANT OF LEFT BREAST IN FEMALE, ESTROGEN RECEPTOR NEGATIVE: ICD-10-CM

## 2024-08-01 PROCEDURE — 99213 OFFICE O/P EST LOW 20 MIN: CPT | Mod: 95,,, | Performed by: OBSTETRICS & GYNECOLOGY

## 2024-08-01 NOTE — PROGRESS NOTES
"The patient location is: her car  The chief complaint leading to consultation is: questions about contraception and balancing hormones    Visit type: audiovisual    Face to Face time with patient: 25   minutes of total time spent on the encounter, which includes face to face time and non-face to face time preparing to see the patient (eg, review of tests), Obtaining and/or reviewing separately obtained history, Documenting clinical information in the electronic or other health record, Independently interpreting results (not separately reported) and communicating results to the patient/family/caregiver, or Care coordination (not separately reported).         Each patient to whom he or she provides medical services by telemedicine is:  (1) informed of the relationship between the physician and patient and the respective role of any other health care provider with respect to management of the patient; and (2) notified that he or she may decline to receive medical services by telemedicine and may withdraw from such care at any time.    Notes:     SUBJECTIVE:   33 y.o. female   presents today for virtual visit to discuss birth control.  No LMP recorded..  She has questions about using oral contraceptive pills because she had triple negative breast cancer.  She had a left mastectomy and right breast is still in place.  She also has questions about "balancing her hormones"  She reports that occasionally she will have 2 periods a month.  She does have a history of fibroids and has had prior myomectomy.  She denies heavy bleeding.        Past Medical History:   Diagnosis Date    Asthma     reports as a child    Astigmatism of left eye     Breast cancer     Left    Complete spontaneous  2014 9:22:39 AM    [facility] - [facility]: , spontaneous, complete-No Additional Notes    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 2014 9:22:57 AM    [facility] - [facility]: , habitual " w/o pregnancy-No Additional Notes    Missed  2014 9:22:49 AM    [facility] - [facility]: , Missed, Less than 22 Weeks-No Additional Notes    Prior miscarriage with pregnancy in first trimester, antepartum     Prior miscarriage with pregnancy in first trimester, antepartum     Prior miscarriage with pregnancy in second trimester, antepartum      Past Surgical History:   Procedure Laterality Date    DILATION AND CURETTAGE OF UTERUS      INJECTION FOR SENTINEL NODE IDENTIFICATION Left 2024    Procedure: INJECTION, FOR SENTINEL NODE IDENTIFICATION;  Surgeon: Ivy Posada MD;  Location: [facility];  Service: General;  Laterality: Left;    INSERTION OF BREAST TISSUE EXPANDER Left 2024    Procedure: INSERTION, TISSUE EXPANDER, BREAST /  UNLATERAL LEFT TISSUE EXPANDER;  Surgeon: Mehran Street DO;  Location: [facility];  Service: General;  Laterality: Left;  2 HORS    MASTECTOMY Left 2024    Procedure: MASTECTOMY;  Surgeon: Ivy Posada MD;  Location: [facility];  Service: General;  Laterality: Left;  1.5 HRS    MASTECTOMY WITH SENTINEL NODE BIOPSY AND AXILLARY LYMPH NODE DISSECTION Left 2024    Procedure: MASTECTOMY, WITH SENTINEL NODE BIOPSY AND AXILLARY LYMPHADENECTOMY;  Surgeon: Ivy Posada MD;  Location: [facility];  Service: General;  Laterality: Left;    MYOMECTOMY N/A 2022    Procedure: MYOMECTOMY;  Surgeon: Renzo Montoya III, MD;  Location: [facility];  Service: OB/GYN;  Laterality: N/A;    none      RECONSTRUCTION OF BREAST WITH DEEP INFERIOR EPIGASTRIC ARTERY  (LETY) FREE FLAP Left 2024    Procedure: RECONSTRUCTION, BREAST, USING LETY FREE FLAP;  Surgeon: Mehran Street DO;  Location: [facility];  Service: Plastics;  Laterality: Left;  Delayed left LETY flap    REMOVAL OF BREAST IMPLANT Left 2024    Procedure: REMOVAL, IMPLANT, BREAST;  Surgeon: Mehran Street DO;  Location: [facility];  Service: Plastics;  Laterality: Left;   left TE removal    SENTINEL LYMPH NODE BIOPSY Left 2024    Procedure: BIOPSY, LYMPH NODE, SENTINEL;  Surgeon: Ivy Posada MD;  Location: [facility];  Service: General;  Laterality: Left;    TONSILLECTOMY       Social History     Socioeconomic History    Marital status: Single   Tobacco Use    Smoking status: Never    Smokeless tobacco: Never   Substance and Sexual Activity    Alcohol use: Yes     Comment: weekends    Drug use: No    Sexual activity: Yes     Partners: Male     Birth control/protection: OCP     Social Determinants of Health     Financial Resource Strain: Medium Risk (2024)    Overall Financial Resource Strain (CARDIA)     Difficulty of Paying Living Expenses: Somewhat hard   Food Insecurity: Food Insecurity Present (2024)    Hunger Vital Sign     Worried About Running Out of Food in the Last Year: Sometimes true     Ran Out of Food in the Last Year: Never true   Transportation Needs: No Transportation Needs (2024)    PRAPARE - Transportation     Lack of Transportation (Medical): No     Lack of Transportation (Non-Medical): No   Physical Activity: Inactive (2024)    Exercise Vital Sign     Days of Exercise per Week: 0 days     Minutes of Exercise per Session: 0 min   Stress: Stress Concern Present (2024)    [place] citizen [place] [employer] - Occupational Stress Questionnaire     Feeling of Stress : To some extent   Housing Stability: Low Risk  (2024)    Housing Stability Vital Sign     Unable to Pay for Housing in the Last Year: No     Number of Places Lived in the Last Year: 1     Unstable Housing in the Last Year: No     Family History   Problem Relation Name Age of Onset    No Known Problems Mother      No Known Problems Father      Vaginal cancer Neg Hx      Endometrial cancer Neg Hx      Cervical cancer Neg Hx       OB History    Para Term  AB Living   3       3     SAB IAB Ectopic Multiple Live Births                  # Outcome Date GA Lbr  Ruy/2nd Weight Sex Type Anes PTL Lv   3 AB            2 AB            1 AB                    Current Outpatient Medications   Medication Sig Dispense Refill    albuterol sulfate (INV ALBUTEROL) 90 mcg inhalation Inhale 2 puffs into the lungs as needed (PRN wheeze). 2 puffs inhaled PRN      cetirizine HCl (ZYRTEC ORAL) Take by mouth once daily at 6am.      ferrous sulfate (IRON ORAL) Take by mouth as needed.       No current facility-administered medications for this visit.     Allergies: Iodine and Shellfish containing products     The ASCVD Risk score (Amor DK, et al., 2019) failed to calculate for the following reasons:    The 2019 ASCVD risk score is only valid for ages 40 to 79      ROS:  Constitutional: no weight loss, weight gain, fever, fatigue  Eyes:  No vision changes, glasses/contacts  ENT/Mouth: No ulcers, sinus problems, ears ringing, headache  Cardiovascular: No inability to lie flat, chest pain, exercise intolerance, swelling, heart palpitations  Respiratory: No wheezing, coughing blood, shortness of breath, or cough  Gastrointestinal: No diarrhea, bloody stool, nausea/vomiting, constipation, gas, hemorrhoids  Genitourinary: No blood in urine, painful urination, urgency of urination, frequency of urination, incomplete emptying, incontinence, abnormal bleeding, painful periods, heavy periods, vaginal discharge, vaginal odor, painful intercourse, sexual problems, bleeding after intercourse.  Musculoskeletal: No muscle weakness  Skin/Breast: No painful breasts, nipple discharge, masses, rash, ulcers  Neurological: No passing out, seizures, numbness, headache  Endocrine: No diabetes, hypothyroid, hyperthyroid, hot flashes, hair loss, abnormal hair growth, acne  Psychiatric: No depression, crying  Hematologic: No bruises, bleeding, swollen lymph nodes, anemia.      Physical Exam  Deferred- virtual    ASSESSMENT:   1. Encounter for counseling regarding contraception        2. Carcinoma of upper-inner  quadrant of left breast in female, estrogen receptor negative  Ambulatory referral/consult to [facility]            PLAN:   Counseled her that I have reviewed her oncology notes and even though she had triple negative breast cancer the recommendation is still to avoid hormonal contraception.  She does still have a right breast and there is the possibility of an estrogen receptor positive breast cancer.  Would recommend barrier methods in the form of condoms orPhexxi or nonhormonal IUD.  Counseled her that all are safe in the context of breast cancer.   She will let me know if she is interested in IUD or prescription for Phexxi

## 2024-08-05 ENCOUNTER — TELEPHONE (OUTPATIENT)
Dept: PLASTIC SURGERY | Facility: CLINIC | Age: 34
End: 2024-08-05
Payer: COMMERCIAL

## 2024-08-05 DIAGNOSIS — C50.212 CARCINOMA OF UPPER-INNER QUADRANT OF LEFT BREAST IN FEMALE, ESTROGEN RECEPTOR NEGATIVE: Primary | ICD-10-CM

## 2024-08-05 DIAGNOSIS — Z17.1 CARCINOMA OF UPPER-INNER QUADRANT OF LEFT BREAST IN FEMALE, ESTROGEN RECEPTOR NEGATIVE: Primary | ICD-10-CM

## 2024-08-29 ENCOUNTER — PATIENT MESSAGE (OUTPATIENT)
Dept: HEMATOLOGY/ONCOLOGY | Facility: CLINIC | Age: 34
End: 2024-08-29
Payer: COMMERCIAL

## 2024-08-30 ENCOUNTER — HOSPITAL ENCOUNTER (OUTPATIENT)
Dept: RADIOLOGY | Facility: OTHER | Age: 34
Discharge: HOME | End: 2024-08-30
Attending: NURSE PRACTITIONER
Payer: COMMERCIAL

## 2024-08-30 DIAGNOSIS — Z17.1 CARCINOMA OF UPPER-INNER QUADRANT OF LEFT BREAST IN FEMALE, ESTROGEN RECEPTOR NEGATIVE: ICD-10-CM

## 2024-08-30 DIAGNOSIS — Z90.12 HISTORY OF LEFT MASTECTOMY: ICD-10-CM

## 2024-08-30 DIAGNOSIS — C50.212 CARCINOMA OF UPPER-INNER QUADRANT OF LEFT BREAST IN FEMALE, ESTROGEN RECEPTOR NEGATIVE: ICD-10-CM

## 2024-08-30 DIAGNOSIS — M79.89 LEFT ARM SWELLING: Primary | ICD-10-CM

## 2024-08-30 DIAGNOSIS — M79.89 LEFT ARM SWELLING: ICD-10-CM

## 2024-08-30 PROCEDURE — 93971 EXTREMITY STUDY: CPT | Mod: 26,LT,, | Performed by: RADIOLOGY

## 2024-08-30 PROCEDURE — 93971 EXTREMITY STUDY: CPT | Mod: TC,LT

## 2024-09-06 ENCOUNTER — OFFICE VISIT (OUTPATIENT)
Dept: HEMATOLOGY/ONCOLOGY | Facility: CLINIC | Age: 34
End: 2024-09-06
Payer: COMMERCIAL

## 2024-09-06 VITALS
OXYGEN SATURATION: 95 % | BODY MASS INDEX: 33.17 KG/M2 | DIASTOLIC BLOOD PRESSURE: 79 MMHG | WEIGHT: 175.69 LBS | HEIGHT: 61 IN | TEMPERATURE: 98 F | HEART RATE: 92 BPM | SYSTOLIC BLOOD PRESSURE: 133 MMHG

## 2024-09-06 DIAGNOSIS — Z90.12 HISTORY OF LEFT MASTECTOMY: ICD-10-CM

## 2024-09-06 DIAGNOSIS — C50.212 CARCINOMA OF UPPER-INNER QUADRANT OF LEFT BREAST IN FEMALE, ESTROGEN RECEPTOR NEGATIVE: ICD-10-CM

## 2024-09-06 DIAGNOSIS — Z17.1 CARCINOMA OF UPPER-INNER QUADRANT OF LEFT BREAST IN FEMALE, ESTROGEN RECEPTOR NEGATIVE: ICD-10-CM

## 2024-09-06 DIAGNOSIS — I89.0 LYMPHEDEMA OF LEFT ARM: Primary | ICD-10-CM

## 2024-09-06 DIAGNOSIS — Z12.39 BREAST CANCER SCREENING, HIGH RISK PATIENT: ICD-10-CM

## 2024-09-06 DIAGNOSIS — Z80.3 FAMILY HISTORY OF BREAST CANCER: ICD-10-CM

## 2024-09-06 DIAGNOSIS — Z90.12 H/O LEFT MASTECTOMY: ICD-10-CM

## 2024-09-06 PROCEDURE — 99999 PR PBB SHADOW E&M-EST. PATIENT-LVL III: CPT | Mod: PBBFAC,,, | Performed by: NURSE PRACTITIONER

## 2024-09-06 NOTE — PROGRESS NOTES
[facility]/ [facility]      Chief Complaint: TNBC      Cancer Staging   Carcinoma of upper-inner quadrant of left breast in female, estrogen receptor negative  Staging form: Breast, AJCC 8th Edition  - Clinical stage from 2024: Stage IIB (cT2, cN0, cM0, G3, ER-, AK-, HER2-) - Signed by Vannessa Barney MD on 2024      HPI:  Alisha Cardenas is a 32yo woman who presents today for follow up of breast cancer. Her oncologic history is as follows:    -Presented w palpable L breast mass that she first noticed around [facility]. Diagnostic MMG 23 reveals a 34 x 33 x 32mm in the L breast at 10oclock. Enlarged axillary LN measuring 18 x 15 x 14mm. No concerning Rt breast findings. Subsequent biopsy showing poorly differentiated invasive carcinoma, grade 3. ER negative, AK negative, Her2 0, Ki67 >90%. LN negative for metastatic carcinoma.   -MRI breast 1/10/23 revealed left breast upper inner mass measuring 4.7 cm craniocaudal x 4.6 cm AP x 4.0 cm   -s/p genetic testing that was negative for clinically significant mutation; VUS in CTNNA1  - 24 Underwent total left mastectomy. 5.5cm grade 3 IDC. Closest margin 2mm on posterior aspect. 0/17 LN.  -met with radiation oncology; decided to forego adjuvant radiation  -2024 underwent L breast reconstruction    Denies significant pmh. FH notable for a cousin dx with breast cancer at 34yo and aunt with breast cancer. She lives in [place] with her 19yo niece. Works in childcare.     Gyn History:   Menarche: 10yo  Menopause: N/A LMP 2023     : No  HRT: No    Interval History:  Patient presents to clinic alone today.   Noted swelling in left arm after a flight.   She had a LUE US 2024 which was negative for thrombus.   No other issues or concerns.       Patient Active Problem List   Diagnosis    Acute anemia    Viral syndrome    S/P abdominal myomectomy    Carcinoma  of upper-inner quadrant of left breast in female, estrogen receptor negative       Current Outpatient Medications   Medication Instructions    albuterol sulfate (INV ALBUTEROL) 90 mcg inhalation 2 puffs, Inhalation, As needed (PRN), 2 puffs inhaled PRN    cetirizine HCl (ZYRTEC ORAL) Oral, Daily    ferrous sulfate (IRON ORAL) Oral, As needed (PRN)       Review of Systems:   Review of Systems   Constitutional:         See above   All other systems reviewed and are negative.       PHYSICAL EXAM:  Physical Exam  Vitals reviewed.   Constitutional:       General: She is not in acute distress.     Appearance: Normal appearance.   HENT:      Nose: Nose normal.      Mouth/Throat:      Mouth: Mucous membranes are moist.   Eyes:      General: No scleral icterus.     Conjunctiva/sclera: Conjunctivae normal.      Pupils: Pupils are equal, round, and reactive to light.   Cardiovascular:      Rate and Rhythm: Normal rate and regular rhythm.   Pulmonary:      Effort: Pulmonary effort is normal.      Breath sounds: Normal breath sounds. No wheezing.      Comments: Breasts: left mastectomy with reconstruction. No right breast masses. No LAD  Abdominal:      General: Abdomen is flat. Bowel sounds are normal. There is no distension.      Palpations: Abdomen is soft. There is no mass.      Tenderness: There is no abdominal tenderness.   Musculoskeletal:         General: Normal range of motion.      Cervical back: Normal range of motion and neck supple.      Right lower leg: No edema.      Left lower leg: No edema.      Comments: No spinal or paraspinal tenderness  Mild left arm lymphedema   Skin:     General: Skin is warm and dry.   Neurological:      Mental Status: She is alert and oriented to person, place, and time.      Motor: No weakness.   Psychiatric:         Mood and Affect: Mood normal.     Pertinent Labs & Imaging:  Reviewed all recent labs, imaging and pathology.     Assessment & Plan:  Alisha is a can 32yo woman with  recently diagnosed at least Stage IIB TNBC (cT2N0) who presents today for evaluation.    She was previously counseled regarding recommendations for her TNBC measuring 5.5cm, but she opted against chemotherapy and chose to undergo surgery upfront. Following her L mastectomy in February, she again opted to forego chemotherapy and radiation in favor of pursuing reconstruction sooner. She is s/p L breast reconstruction. We explained that she is outside of the optimal window to begin chemotherapy and recommended clinical monitoring, to which the patient agreed.  We discussed pursuing a high-risk screening protocol of mammogram and breast MRI annually alternating every 6 months. We counseled her extensively about possible symptoms of recurrence, including that the presence of symptoms can vary widely depending on location. We also discussed hormonal birth control and advised that it might be best to avoid systemic hormone therapy if possible not for risk of TNBC recurrence, but rather de rodrigo HR+ breast cancer risk.     The patient reports she has met with radiation oncology and opted against radiation. We will continue to monitor clinically with mammogram and MRI yearly and clinic visits every 3-4 months.     #TNBC:   --S/p flap reconstruction on 4/1   --Will need yearly mammogram and breast MRI alternating every 6 months for high-risk screening protocol  --Will see patient in clinic every 3-4 months for the first 5 years after diagnosis    #Lymphedema  Refer to PT  Compression sleeve.         Route Chart for Scheduling    Med Onc Chart Routing  Urgent    Follow up with physician 3 months. with MRI breast (Dr. Barney)   Follow up with [facility]    Infusion scheduling note    Injection scheduling note    Labs    Imaging    Pharmacy appointment    Other referrals         Refer to PT       Patient reports she wished someone would have told her about PT earlier as this visit was a waste of time and she could have done PT today  instead of seeing me. Reassurance given and order placed as above. Will get her set up as soon as possible         Patient is in agreement with the proposed treatment plan. All questions were answered to the patient's satisfaction. Pt knows to call clinic for any new or worsening symptoms and if anything is needed before the next clinic visit.    Mariela Tan, MSN, APRN, FNP-C  Nurse Practitioner to Dr. Marla Conti  [facility]  [facility]  Hematology & Medical Oncology  [address] [place] [id number]  ph. [phone] ext [phone]  Fax. [phone]              30 minutes of total time spent on the encounter, which includes face to face time and non-face to face time preparing to see the patient (eg, review of tests), Obtaining and/or reviewing separately obtained history, Documenting clinical information in the electronic or other health record, Independently interpreting results (not separately reported) and communicating results to the patient/family/caregiver, or Care coordination (not separately reported).

## 2024-09-10 ENCOUNTER — CLINICAL SUPPORT (OUTPATIENT)
Dept: REHABILITATION | Facility: HOSPITAL | Age: 34
End: 2024-09-10
Payer: COMMERCIAL

## 2024-09-10 DIAGNOSIS — I97.2 POST-MASTECTOMY LYMPHEDEMA SYNDROME: ICD-10-CM

## 2024-09-10 DIAGNOSIS — Z90.12 H/O LEFT MASTECTOMY: ICD-10-CM

## 2024-09-10 DIAGNOSIS — Z17.1 CARCINOMA OF UPPER-INNER QUADRANT OF LEFT BREAST IN FEMALE, ESTROGEN RECEPTOR NEGATIVE: ICD-10-CM

## 2024-09-10 DIAGNOSIS — I89.0 LYMPHEDEMA OF LEFT ARM: ICD-10-CM

## 2024-09-10 DIAGNOSIS — C50.212 CARCINOMA OF UPPER-INNER QUADRANT OF LEFT BREAST IN FEMALE, ESTROGEN RECEPTOR NEGATIVE: ICD-10-CM

## 2024-09-10 DIAGNOSIS — Z74.09 IMPAIRED FUNCTIONAL MOBILITY AND ACTIVITY TOLERANCE: Primary | ICD-10-CM

## 2024-09-10 PROCEDURE — 97535 SELF CARE MNGMENT TRAINING: CPT

## 2024-09-10 PROCEDURE — 97162 PT EVAL MOD COMPLEX 30 MIN: CPT

## 2024-09-10 PROCEDURE — 97110 THERAPEUTIC EXERCISES: CPT

## 2024-09-10 NOTE — PATIENT INSTRUCTIONS
When to call your doctor   - if any part of your affected arm or axilla feels hot, is reddened or has increased swelling   - if you develop a temperature over 101 degrees Fahrenheit      Lymphedema - Identification and Prevention     Lymphedema - is the swelling of a body area or extremity caused by the accumulation of lymphatic fluid.  There is a risk for lymphedema with the removal of lymph nodes, trauma or radiation therapy.  Treatment of breast cancer often involves surgery: mastectomy or lumpectomy. Some of the lymph nodes in the underarm (called axillary lymph nodes) may be removed and checked to see if they contain cancer cells.     During breast surgery when axillary lymph nodes are removed (with sentinel node biopsy or axillary dissection) or are treated with radiation therapy, the lymphatic system may become impaired. This may prevent lymphatic fluid from leaving the area therefore, causing lymphedema.     Lymphatic fluid is a normal part of the circulatory system. Its function is to remove waste products and to produce cells vital to fighting infection. Swelling occurs when the vessels become restricted and the lymphatic fluid is unable to freely flow through them.  If lymphedema is left untreated, the affected limb could progressively become more swollen, which could lead to hardening of the skin, bulkiness in the limb, infection and impaired wound healing.         There are things you can do to decrease the chance of developing lymphedema.                                          [email or web address]                                                                                                                                                  The information presented is intended for general information and educational purposes. It is not intended to replace the  advice of your health care provider. Contact your health care provider if you believe you have a health  problem.                                              Scapular Retraction (Standing)    With arms at sides, squeeze shoulder blades together. Do not shrug shoulders and do not hold your breath. Hold 5 seconds. Repeat 10 times, 2-3 sets, 2 x day.  Can be completed seated or standing.      Exaggerated Breathing and Relaxation      Practice deep breathing frequently in the first few days following surgery even before you begin exercising. This exercise helps with tissue extensibility in the chest wall.  Inhale slowly and deeply through the nose and exhale through pursed lips. Concentrate on relaxing as you let the air out of your lungs. Repeat three (3) to four (4) times, remembering to breath in deeply and then relaxing. This exercise helps to ease the sensation of pulling and discomfort that may be experienced while exercising.      Ball Squeeze OR Hand pumps       Perform this exercise three (3) times a day for 1-3 minutes each time.    The ball squeeze or hand pumps helps to prevent or reduce temporary swelling that may occur in the affected arm. This exercise may be performed standing, sitting or while lying in bed. During this exercise the affected arm should be slightly bent and held upward. Support your arm with a pillow if you are uncomfortable holding it up.    a. Hold a rubber ball in your hand on the affected side and lift your arm upward.  b. Alternate squeezing and relaxing the ball.        AROM: Elbow Flexion / Extension        With involved hand(s) palm up, gently bend elbow as far as possible. Then straighten arm as far as possible. Do this seated or standing. You can do one arm at a time or both at the same time.  Repeat 10 times per set. Do 2-3 sets per session. Do 1-3 sessions per day.            Edema Wear (white fishnet sleeve with navy stripe):  Wear during the day and remove at night. Please discontinue if it causes pain, numbness, changes in skin color/temp, swelling, or skin irritation.  Machine or hand wash cold in gentle detergent (no bleach) and hang to dry.    If you get an arthritis compression glove that can be worn day and night to tolerance/preference.

## 2024-09-10 NOTE — PLAN OF CARE
[facility]  Physical Therapy Initial Evaluation    Name: Alisha Cardenas  Clinic Number: [id number]    Therapy Diagnosis:   Encounter Diagnoses   Name Primary?    Lymphedema of left arm     Carcinoma of upper-inner quadrant of left breast in female, estrogen receptor negative     H/O left mastectomy     Impaired functional mobility and activity tolerance Yes    Post-mastectomy lymphedema syndrome         Physician: Mariela Tan, NP    Physician Orders: PT Eval and Treat - Lymphedema  Medical Diagnosis from Referral: Lymphedema of left arm [I89.0], Carcinoma of upper-inner quadrant of left breast in female, estrogen receptor negative [C50.212, Z17.1], H/O left mastectomy   Evaluation Date: 9/10/2024  Authorization Period Expiration: 9/6/25  Plan of Care Expiration: 12/6/24  Progress Note Due: 10/11/24  Visit # / Visits authorized: 1/ 1   FOTO: 1/3    Precautions: Standard and cancer     Time In: 2:58 PM  Time Out: 3:53 PM  Total Appointment Time (timed & untimed codes): 55 minutes      History   History of Present Illness: Alisha is a 33 y.o. female that presents to  [facility] at the [facility] clinic s/p L breast surgery. Pt with history of left triple negative breast cancer. She is s/p L TM with TE and ALND on 2/21/24 and s/p LETY reconstruction on 4/1/24. Pt is scheduled for mastopexy to improve symmetry of right breat on 9/20/24.  Diagnosis: Lymphedema of left arm [I89.0], Carcinoma of upper-inner quadrant of left breast in female, estrogen receptor negative [C50.212, Z17.1], H/O left mastectomy   Chief complaint: pt states she went on a trip a few weeks ago, and when she got back her left arm was swollen. She contacted her MD, who ordered an US, which was negative for DVT.  Pt states she does have some pain with it, especially with picking up kids at work. Pt states she feels most swollen in her left hand.    Surgical History:  Alisha  Trish Cardenas  has a past surgical history that includes none; Tonsillectomy; Dilation and curettage of uterus; Myomectomy (N/A, 2022); Mastectomy (Left, 2024); [place] lymph node biopsy (Left, 2024); Injection for sentinel node identification (Left, 2024); Mastectomy with sentinel node biopsy and axillary lymph node dissection (Left, 2024); Insertion of breast tissue expander (Left, 2024); Removal of breast implant (Left, 2024); and Reconstruction of breast with deep inferior epigastric artery  (LETY) free flap (Left, 2024).    Chemotherapy: none  Radiation: none  Endocrine therapy: none    Past Medical History:   Diagnosis Date    Asthma     reports as a child    Astigmatism of left eye     Breast cancer     Left    Complete spontaneous  2014 9:22:39 AM    [facility] Historical - [facility]: , spontaneous, complete-No Additional Notes    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 2014 9:22:57 AM    [facility] - [facility]: , habitual w/o pregnancy-No Additional Notes    Missed  2014 9:22:49 AM    [facility]: , Missed, Less than 22 Weeks-No Additional Notes    Prior miscarriage with pregnancy in first trimester, antepartum     Prior miscarriage with pregnancy in first trimester, antepartum     Prior miscarriage with pregnancy in second trimester, antepartum           Medications:  Alisha has a current medication list which includes the following prescription(s): inv albuterol, cetirizine hcl, and ferrous sulfate.    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  Exercise routine prior to onset: pt walks in the park for an hour, 3 days/week and she does Emily on the TV if it's raining  Hand dominance: right      Patient's Goals: for swelling to go away        Subjective   Pt states: Feels swelling and tenderness in her left arm. Pt states the swelling is worse in the morning, but she may be sleeping on that side by mistake.  Pain: 6/10 on VAS currently.   Best: 1/10  Worst: 7/10   Pain location: left hand   Objective   Mental status :A+O x 3    Postural examination/scapular alignment:  no major deviations    Skin Integrity:   Scar Location: Left breast and abdoment  Appearance: healed  Signs of infection: No  Drainage: N/A  Color:N/A    Edema: Mild- stage 1 lymphedema  Location: Left forearm and hand- it is noted, skin is soft, no pitting present  (-) Stemmer sign      Axillary Web Syndrome/Cording:   Location: TBA  Degree of Cording: TBA (mild, moderate etc...)   Number of cords present: TBA    Sensation: WNL         Range of Motion: Grossly WFL and symmetrical in BUE's         Strength: to be assessed as appropriate        Baseline Measurements of BL UE's for early detection of Lymphedema:     LANDMARK RIGHT UE LEFT UE DIFFERENCE   E + 8" 38.5 cm 37.5 cm 1 cm   E + 6" 38 cm 37.5 cm 0.5 cm   E + 4" 35 cm 34.5 cm 0.5 cm   E + 2" 32 cm 31.5 cm 0.5 cm   Elbow 26 cm 25.5 cm 0.5 cm   W+ 8" 26.5 cm 26.5 cm 0 cm   W +  6" 24.5 cm 25 cm 0.5 cm   W + 4" 20 cm 21.5 cm 1.5 cm   Wrist 16 cm 17 cm 1 cm   DPC 19 cm 19.5 cm 0.5 cm   IP Thumb 6.5 cm 6.5 cm 0 cm     Functional Mobility (Bed mobility, transfers)  Mod I to independent    ADL's (Activities of Daily Living):  independent    Gait Assessment:   - Assistive device used: none  - Assistance: independent  - Distance: community distances     Patient Education Provided   - role of " therapy in multi - disciplinary team, goals for therapy  - plan of care, scheduling      Pt has no cultural, educational or language barriers to learning provided.  Treatment and Instruction of Home Exercise Program      Total Time Separate from Evaluation: 40 minutes      Alisha received therapeutic exercises to develop ROM, posture, and lymphatic motility for 8 minutes including:     Advised pt to continue her walking and Emily program    Encouraged pt to perform following exercises:  -hand pumps (with elevation), bicep curls, chicken wings, deep breathing, and scapular retractions. (10 reps each a few times/day)    Alisha received the following self-care/home management techniques for 32 minutes, including:    - Pt was educated in lymph anatomy & function, lymphedema etiology and management plans.    - Pt was provided with written risk reductions and precautions for managing lymphedema.   -Discussed components of complete decongestive therapy, timing for compression garments.    Pt was provided with size small EdemaWear for left UE. Pt was advised to wear during the day and remove at night. Pt advised to discontinue if it causes pain, numbness, changes in skin color/temp, swelling, or skin irritation. Pt was told to machine or hand wash cold in gentle detergent (no bleach) and hang to dry. Pt with good understanding of all topics.     Pt was provided with guidance for selecting an arthritis compression glove. Pt gave options of what to look for and how to measure. Pt was told she can wear day and night to tolerance.    Advised pt that her upcoming surgery will likely put a hold on lymph services and will need clearance from plastics about when we can resume.    Encouraged pt to elevate her arm t/o the day and advised to avoid sleeping on her left hand to see if the swelling reduces    Written Home Exercises Provided: yes.  Exercises were reviewed and Alisha was able to demonstrate them prior to the end of the  session.  Alisha demonstrated good  understanding of the education provided.     See EMR under Patient Instructions for exercises provided 9/10/2024.      Functional Limitations Reporting        Intake Outcome Measure for FOTO Upper Quadrant Edema Survey    Therapist reviewed FOTO scores for Alisha Cardenas on 9/10/2024.   FOTO documents entered into [employer] - see Media section.    Intake Score: 51%             Assessment   This is a 33 y.o. female referred to outpatient physical therapy and presents with a medical diagnosis of Lymphedema of left arm [I89.0], Carcinoma of upper-inner quadrant of left breast in female, estrogen receptor negative [C50.212, Z17.1], H/O left mastectomy. Pt presents with signs and symptoms of Stage 1 lymphedema in her left hand and forearm, and she reports decreased tolerance for performing work activities due to her symptoms.  Pt verbalizes understanding of all education today, but she may need review.   Pt instructed in HEP this session and was able to perform all exercises given independently. Pt instructed to follow up with therapist if any concerns arise with program established. Pt will continue to benefit from skilled physical therapy to address the impairments stated in chart below, provide patient/family education and to maximize pt's level of independence in home and community environment     Pt prognosis is Good.    Anticipated barriers to physical therapy: upcoming mastopexy     Pt's spiritual, cultural and educational needs considered and pt agreeable to plan of care and goals as stated below:       Medical Necessity is demonstrated by the following:  History  Co-morbidities and personal factors that may impact the plan of care [] LOW: no personal factors / co-morbidities  [x] MODERATE: 1-2 personal factors / co-morbidities  [] HIGH: 3+ personal factors / co-morbidities    Moderate / High Support Documentation:   Co-morbidities affecting plan of care: breast  cancer    Personal Factors:   no deficits     Examination  Body Structures and Functions, activity limitations and participation restrictions that may impact the plan of care [] LOW: addressing 1-2 elements  [x] MODERATE: 3+ elements  [] HIGH: 4+ elements (please support below)    Moderate / High Support Documentation: lymphedema, pain, decreased functional activity tolerance     Clinical Presentation [] LOW: stable  [x] MODERATE: Evolving  [] HIGH: Unstable     Decision Making/ Complexity Score: moderate       Goals: Pt agrees with goals set    Short Term Goals (6 weeks)  1. Pt will demo decrease in girth in left upper extremity by >/= 1cm to allow for improved UE symmetry, clothing choice, ROM, and UE function. (progressing, not met)  2. Patient will demonstrate 100% knowledge of lymphedema precautions and signs of infection to allow for reduced risk of lymphedema, reduced risk of infection, and/or exacerbation.  (progressing, not met)  3. Patient will perform self lymph drainage techniques to enhance lymphatic drainage and mobility.  (progressing, not met)  4. Patient will tolerate daily activities with multilayered bandaging to enhance lymphatic drainage and skin elasticity.  (progressing, not met)  5. Pt will tolerate HEP for improved strength, functional mobility, ROM, posture, and endurance. (progressing, not met)  6. Pt will demo self-bandaging technique with Mod I assist to facilitate carryover with compression between therapy session and to maximize outcomes (progressing, not met)       Long Term Goals: ( 12  weeks)  1. Patient to show decreased girth in left upper extremity by >/= 2cm to allow for improved UE symmetry, clothing choice, ROM, and UE function.  (progressing, not met)  2. Patient to shola/doff compression garment with daily compliance to support lymphatic mobility and skin elasticity.  (progressing, not met)  3. Pt to show improved postural awareness and alignment for improved functional  mobility.  (progressing, not met)  4. Pt to be independent and compliant with HEP to allow for improved ROM, reach and carry with functional endurance and strength.    (progressing, not met)    Plan   Outpatient physical therapy 2x week for 12 weeks to include the following:   Manual Therapy, Neuromuscular Re-ed, Orthotic Management and Training, Patient Education, Self Care, Therapeutic Activities, and Therapeutic Exercise. complete decongestive therapy      Plan of care Certification Period: 9/10/2024 to 12/6/24.    Therapist: Marialuisa Schuler, PT  9/10/2024

## 2024-09-11 PROBLEM — I89.0 LYMPHEDEMA OF LEFT ARM: Status: ACTIVE | Noted: 2024-09-11

## 2024-09-11 PROBLEM — I97.2 POST-MASTECTOMY LYMPHEDEMA SYNDROME: Status: ACTIVE | Noted: 2024-09-11

## 2024-09-11 PROBLEM — Z74.09 IMPAIRED FUNCTIONAL MOBILITY AND ACTIVITY TOLERANCE: Status: ACTIVE | Noted: 2024-09-11

## 2024-09-17 ENCOUNTER — OFFICE VISIT (OUTPATIENT)
Dept: PLASTIC SURGERY | Facility: CLINIC | Age: 34
End: 2024-09-17
Payer: COMMERCIAL

## 2024-09-17 DIAGNOSIS — Z71.89 ENCOUNTER TO DISCUSS BREAST RECONSTRUCTION: ICD-10-CM

## 2024-09-17 DIAGNOSIS — Z09 SURGERY FOLLOW-UP: Primary | ICD-10-CM

## 2024-09-17 PROCEDURE — 99214 OFFICE O/P EST MOD 30 MIN: CPT | Mod: S$GLB,,, | Performed by: SURGERY

## 2024-09-17 PROCEDURE — 99999 PR PBB SHADOW E&M-EST. PATIENT-LVL II: CPT | Mod: PBBFAC,,, | Performed by: SURGERY

## 2024-09-17 NOTE — PROGRESS NOTES
Plastic Surgery History & Physical    SUBJECTIVE:   Chief complaint: Preoperative visit for revision of autologous breast reconstruction    History of Present Illness:  33 y.o. female presenting to plastic surgery clinic for a preoperative visit. Patient has a history of left breast cancer s/p left nipple sparing mastectomy on 24 with bridge expander to delayed LETY flap reconstruction on 24. Her current complaints about her reconstruction include asymmetry of the contralateral breast and upper pole volume deficiency.  At the patient's last visit we discussed second stage revision including right breast mastopexy and liposuction with fat grafting to both breasts. She does not prefer to do anything major revisions to the left reconstructed breast. Since the last clinic visit there have been no significant changes in the patient's history.    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 2014 9:22:57 AM    [facility] - [facility]: , habitual w/o pregnancy-No Additional Notes    Missed  2014 9:22:49 AM    [facility] - [facility]: , Missed, Less than 22 Weeks-No Additional Notes    Prior miscarriage with pregnancy in first trimester, antepartum     Prior miscarriage with pregnancy in first trimester, antepartum     Prior miscarriage with pregnancy in second trimester, antepartum        Past Surgical History:   Procedure Laterality Date    DILATION AND CURETTAGE OF UTERUS      INJECTION FOR SENTINEL NODE IDENTIFICATION Left 2024    Procedure: INJECTION, FOR SENTINEL NODE IDENTIFICATION;  Surgeon: Ivy Posada MD;  Location: [facility];  Service: General;  Laterality: Left;    INSERTION OF BREAST  TISSUE EXPANDER Left 2/21/2024    Procedure: INSERTION, TISSUE EXPANDER, BREAST /  UNLATERAL LEFT TISSUE EXPANDER;  Surgeon: Mehran Street DO;  Location: [facility];  Service: General;  Laterality: Left;  2 HORS    MASTECTOMY Left 2/21/2024    Procedure: MASTECTOMY;  Surgeon: Ivy Posada MD;  Location: [facility];  Service: General;  Laterality: Left;  1.5 HRS    MASTECTOMY WITH SENTINEL NODE BIOPSY AND AXILLARY LYMPH NODE DISSECTION Left 2/21/2024    Procedure: MASTECTOMY, WITH SENTINEL NODE BIOPSY AND AXILLARY LYMPHADENECTOMY;  Surgeon: Ivy Posada MD;  Location: [facility];  Service: General;  Laterality: Left;    MYOMECTOMY N/A 8/5/2022    Procedure: MYOMECTOMY;  Surgeon: Renzo Montoya III, MD;  Location: [facility];  Service: OB/GYN;  Laterality: N/A;    none      RECONSTRUCTION OF BREAST WITH DEEP INFERIOR EPIGASTRIC ARTERY  (LETY) FREE FLAP Left 4/1/2024    Procedure: RECONSTRUCTION, BREAST, USING LETY FREE FLAP;  Surgeon: Mehran Street DO;  Location: [facility];  Service: Plastics;  Laterality: Left;  Delayed left LTEY flap    REMOVAL OF BREAST IMPLANT Left 4/1/2024    Procedure: REMOVAL, IMPLANT, BREAST;  Surgeon: Mehran Street DO;  Location: [facility];  Service: Plastics;  Laterality: Left;  left TE removal    SENTINEL LYMPH NODE BIOPSY Left 2/21/2024    Procedure: BIOPSY, LYMPH NODE, SENTINEL;  Surgeon: Ivy Posada MD;  Location: [facility];  Service: General;  Laterality: Left;    TONSILLECTOMY         Family History   Problem Relation Name Age of Onset    No Known Problems Mother      No Known Problems Father      Vaginal cancer Neg Hx      Endometrial cancer Neg Hx      Cervical cancer Neg Hx         Social History     Socioeconomic History    Marital status: Single   Tobacco Use    Smoking status: Never    Smokeless tobacco: Never   Substance and Sexual Activity    Alcohol use: Yes     Comment: weekends    Drug use: No    Sexual activity: Yes     Partners: Male     Birth  control/protection: OCP     Social Determinants of Health     Financial Resource Strain: Medium Risk (4/2/2024)    Overall Financial Resource Strain (CARDIA)     Difficulty of Paying Living Expenses: Somewhat hard   Food Insecurity: Food Insecurity Present (4/2/2024)    Hunger Vital Sign     Worried About Running Out of Food in the Last Year: Sometimes true     Ran Out of Food in the Last Year: Never true   Transportation Needs: No Transportation Needs (4/2/2024)    PRAPARE - Transportation     Lack of Transportation (Medical): No     Lack of Transportation (Non-Medical): No   Physical Activity: Inactive (4/2/2024)    Exercise Vital Sign     Days of Exercise per Week: 0 days     Minutes of Exercise per Session: 0 min   Stress: Stress Concern Present (4/2/2024)    [employer] - Occupational Stress Questionnaire     Feeling of Stress : To some extent   Housing Stability: Low Risk  (4/2/2024)    Housing Stability Vital Sign     Unable to Pay for Housing in the Last Year: No     Number of Places Lived in the Last Year: 1     Unstable Housing in the Last Year: No       Current Outpatient Medications   Medication Sig Dispense Refill    albuterol sulfate (INV ALBUTEROL) 90 mcg inhalation Inhale 2 puffs into the lungs as needed (PRN wheeze). 2 puffs inhaled PRN      ferrous sulfate (IRON ORAL) Take by mouth as needed. (Patient not taking: Reported on 9/17/2024)       No current facility-administered medications for this visit.       Review of patient's allergies indicates:   Allergen Reactions    Iodine Anaphylaxis    Shellfish containing products Anaphylaxis           OBJECTIVE:     There were no vitals taken for this visit.      Physical Exam:  Gen: NAD, appears stated age  Neuro: normal without focal findings, mental status and speech normal  HEENT: NCAT, neck supple, PEERL  CV: RRR  Pulm: Breathing non-labored, chest wall movement equal bilaterally   Breast: Exam deferred, reviewed medical  photography  Abdomen: soft, non-tender  Gu: not examined  Extremity:normal strength, no cyanosis or edema  Skin: Skin color, texture, turgor normal. No rashes or lesions  Psych: oriented to time, place and person          ASSESSMENT/PLAN:     Alisha Cardenas was seen preoperatively today for revision of flap reconstruction.  The plan for her is right mastopexy, liposuction of abdomen with fat grafting to both breasts.  Discussed details of surgical procedure and reviewed risks of surgery including bleeding, seroma, hematoma, infection, poor wound healing, wound breakdown, poor cosmetic outcome, need for reoperation, changes to sensation, fat necrosis. Risks of liposuction include hematoma, seroma, contour deformity, dimpling, displeasing cosmesis. Surgical consents obtained. Counseled on JAIRO drain use in the event drains are needed. Advised patient to wear post operative garment as instructed and that we typically recommend garments after liposuction be worn for 8 weeks.    Reviewed multimodal pain control regimen used in the post operative period. Prescriptions will be sent to the outpatient pharmacy the day of surgery.  The patient was given a packet including general instructions for post-operative care, instructions for the day of surgery, drain care and process to complete FMLA/Disability paperwork.  All questions were answered. The patient was given a business card with contact info and advised to contact the clinic with any questions or concerns before or after surgery.      Nissa Adhikari PA-C  Plastic and Reconstructive Surgery    This includes face to face time and non-face to face time preparing to see the patient (eg, review of tests), obtaining and/or reviewing separately obtained history, documenting clinical information in the electronic or other health record, independently interpreting results and communicating results to the patient/family/caregiver, or care coordinator.

## 2024-09-19 NOTE — PRE-PROCEDURE INSTRUCTIONS
Unable to reach pt via phone. Left message with instructions along with a request for a call back. In the message, RN also stated that pt will need to be accompanied by a responsible adult on day of surgery. The following was sent to pt portal.    Dear Alisha ,    You are scheduled for a procedure with Dr. Street on 9/20/2024. Your scheduled arrival time is 8:00am.  This arrival time is roughly 2 hours before your anticipated procedure time to allow sufficient time for pre-op.  Please wear comfortable clothes.  This procedure will take place at the [facility] at the corner of [facility] and [facility].  [facility] [place] [facility].  The address is:    [address] [place] [address].  [place] [id number]    After entering the building, you will proceed to the second floor where you can check in with registration. You should take any medications that you routinely take for blood pressure (other than those listed below), heart medications, thyroid, cholesterol, etc.     If you wear contact lenses, please wear glasses to your procedure.    Your fasting instructions are as follow:  Nothing to eat after midnight the evening before your surgery. You may drink clear liquids up until 2 hours prior to your arrival time. You MUST have a responsible adult to bring you home.      The evening before and morning of your procedure, please hold the following medications:  -Aspirin and Aspirin-containing products (Goody's powder, Excedrin)  -NSAIDs (Advil, Ibuprofen, Aleve, Diclofenac)  -Vitamins/Supplements  -Herbal remedies/Teas  -Stimulants (Adderall, Vyvanse, Adipex)  -Diabetic medication (Please bring with you day of procedure)  -Prescription creams/gels/lotions    -May take Tylenol      The evening before and morning of your procedure, take a shower using antibacterial soap (ex: Hibiclens or Dial antibacterial soap). DO NOT apply deodorant, lotion, cologne, or anything else to the  skin. Wear loose, comfortable fitting clothing. Do not wear jewelry or bring any valuables with you. If you wear dentures or contacts, please bring your case with you or leave them at home. Use and bring any inhalers that you may have.    If you have any procedure-specific questions, please call your surgeon's office. Any other questions, don't hesitate to call at [phone].    Thanks,  PHILIP Polk  Pre-Admit Testing  Anesthesia Dept [facility]

## 2024-09-20 ENCOUNTER — ANESTHESIA (OUTPATIENT)
Dept: SURGERY | Facility: HOSPITAL | Age: 34
End: 2024-09-20
Payer: COMMERCIAL

## 2024-09-20 ENCOUNTER — HOSPITAL ENCOUNTER (OUTPATIENT)
Facility: HOSPITAL | Age: 34
Discharge: HOME | End: 2024-09-20
Attending: SURGERY | Admitting: SURGERY
Payer: COMMERCIAL

## 2024-09-20 ENCOUNTER — ANESTHESIA EVENT (OUTPATIENT)
Dept: SURGERY | Facility: HOSPITAL | Age: 34
End: 2024-09-20
Payer: COMMERCIAL

## 2024-09-20 VITALS
WEIGHT: 175 LBS | BODY MASS INDEX: 33.04 KG/M2 | OXYGEN SATURATION: 97 % | SYSTOLIC BLOOD PRESSURE: 115 MMHG | HEART RATE: 87 BPM | RESPIRATION RATE: 18 BRPM | DIASTOLIC BLOOD PRESSURE: 77 MMHG | TEMPERATURE: 97 F | HEIGHT: 61 IN

## 2024-09-20 DIAGNOSIS — C50.212 CARCINOMA OF UPPER-INNER QUADRANT OF LEFT BREAST IN FEMALE, ESTROGEN RECEPTOR NEGATIVE: Primary | ICD-10-CM

## 2024-09-20 DIAGNOSIS — Z85.3 PERSONAL HISTORY OF BREAST CANCER: ICD-10-CM

## 2024-09-20 DIAGNOSIS — Z17.1 CARCINOMA OF UPPER-INNER QUADRANT OF LEFT BREAST IN FEMALE, ESTROGEN RECEPTOR NEGATIVE: Primary | ICD-10-CM

## 2024-09-20 LAB
B-HCG UR QL: NEGATIVE
CTP QC/QA: YES

## 2024-09-20 PROCEDURE — 71000016 HC POSTOP RECOV ADDL HR: Performed by: SURGERY

## 2024-09-20 PROCEDURE — 25000003 PHARM REV CODE 250: Performed by: NURSE ANESTHETIST, CERTIFIED REGISTERED

## 2024-09-20 PROCEDURE — 37000009 HC ANESTHESIA EA ADD 15 MINS: Performed by: SURGERY

## 2024-09-20 PROCEDURE — 63600175 PHARM REV CODE 636 W HCPCS: Performed by: NURSE ANESTHETIST, CERTIFIED REGISTERED

## 2024-09-20 PROCEDURE — 25000003 PHARM REV CODE 250: Performed by: SURGERY

## 2024-09-20 PROCEDURE — 37000008 HC ANESTHESIA 1ST 15 MINUTES: Performed by: SURGERY

## 2024-09-20 PROCEDURE — 63600175 PHARM REV CODE 636 W HCPCS: Performed by: SURGERY

## 2024-09-20 PROCEDURE — 88302 TISSUE EXAM BY PATHOLOGIST: CPT | Mod: 59 | Performed by: PATHOLOGY

## 2024-09-20 PROCEDURE — 81025 URINE PREGNANCY TEST: CPT | Performed by: PHYSICIAN ASSISTANT

## 2024-09-20 PROCEDURE — 19380 REVJ RECONSTRUCTED BREAST: CPT | Mod: LT,,, | Performed by: SURGERY

## 2024-09-20 PROCEDURE — 63600175 PHARM REV CODE 636 W HCPCS: Performed by: ANESTHESIOLOGY

## 2024-09-20 PROCEDURE — 19316 MASTOPEXY: CPT | Mod: 59,RT,, | Performed by: SURGERY

## 2024-09-20 PROCEDURE — 27201423 OPTIME MED/SURG SUP & DEVICES STERILE SUPPLY: Performed by: SURGERY

## 2024-09-20 PROCEDURE — 94761 N-INVAS EAR/PLS OXIMETRY MLT: CPT

## 2024-09-20 PROCEDURE — 88302 TISSUE EXAM BY PATHOLOGIST: CPT | Mod: 26,,, | Performed by: PATHOLOGY

## 2024-09-20 PROCEDURE — 15771 GRFG AUTOL FAT LIPO 50 CC/<: CPT | Mod: 51,,, | Performed by: SURGERY

## 2024-09-20 PROCEDURE — 71000015 HC POSTOP RECOV 1ST HR: Performed by: SURGERY

## 2024-09-20 PROCEDURE — 36000706: Performed by: SURGERY

## 2024-09-20 PROCEDURE — 25000003 PHARM REV CODE 250: Performed by: PHYSICIAN ASSISTANT

## 2024-09-20 PROCEDURE — 36000707: Performed by: SURGERY

## 2024-09-20 PROCEDURE — 15772 GRFG AUTOL FAT LIPO EA ADDL: CPT | Mod: ,,, | Performed by: SURGERY

## 2024-09-20 PROCEDURE — 25000003 PHARM REV CODE 250: Performed by: ANESTHESIOLOGY

## 2024-09-20 PROCEDURE — 71000033 HC RECOVERY, INTIAL HOUR: Performed by: SURGERY

## 2024-09-20 PROCEDURE — 99900035 HC TECH TIME PER 15 MIN (STAT)

## 2024-09-20 RX ORDER — EPINEPHRINE 1 MG/ML
INJECTION INTRAMUSCULAR; INTRAVENOUS; SUBCUTANEOUS
Status: DISCONTINUED | OUTPATIENT
Start: 2024-09-20 | End: 2024-09-20 | Stop reason: HOSPADM

## 2024-09-20 RX ORDER — PROCHLORPERAZINE EDISYLATE 5 MG/ML
5 INJECTION INTRAMUSCULAR; INTRAVENOUS EVERY 30 MIN PRN
Status: DISCONTINUED | OUTPATIENT
Start: 2024-09-20 | End: 2024-09-20 | Stop reason: HOSPADM

## 2024-09-20 RX ORDER — SODIUM CHLORIDE 9 MG/ML
INJECTION, SOLUTION INTRAVENOUS CONTINUOUS
OUTPATIENT
Start: 2024-09-20

## 2024-09-20 RX ORDER — LIDOCAINE HYDROCHLORIDE 10 MG/ML
1 INJECTION, SOLUTION EPIDURAL; INFILTRATION; INTRACAUDAL; PERINEURAL ONCE
Status: DISCONTINUED | OUTPATIENT
Start: 2024-09-20 | End: 2024-09-20 | Stop reason: HOSPADM

## 2024-09-20 RX ORDER — PROPOFOL 10 MG/ML
VIAL (ML) INTRAVENOUS
Status: DISCONTINUED | OUTPATIENT
Start: 2024-09-20 | End: 2024-09-20

## 2024-09-20 RX ORDER — ACETAMINOPHEN 500 MG
1000 TABLET ORAL EVERY 8 HOURS
Start: 2024-09-20 | End: 2024-09-27

## 2024-09-20 RX ORDER — MUPIROCIN 20 MG/G
OINTMENT TOPICAL
Status: DISCONTINUED | OUTPATIENT
Start: 2024-09-20 | End: 2024-09-20 | Stop reason: HOSPADM

## 2024-09-20 RX ORDER — ONDANSETRON HYDROCHLORIDE 2 MG/ML
4 INJECTION, SOLUTION INTRAVENOUS DAILY PRN
Status: DISCONTINUED | OUTPATIENT
Start: 2024-09-20 | End: 2024-09-20 | Stop reason: HOSPADM

## 2024-09-20 RX ORDER — OXYCODONE HYDROCHLORIDE 5 MG/1
5 TABLET ORAL EVERY 4 HOURS PRN
Qty: 10 TABLET | Refills: 0 | Status: SHIPPED | OUTPATIENT
Start: 2024-09-20

## 2024-09-20 RX ORDER — DEXAMETHASONE SODIUM PHOSPHATE 4 MG/ML
INJECTION, SOLUTION INTRA-ARTICULAR; INTRALESIONAL; INTRAMUSCULAR; INTRAVENOUS; SOFT TISSUE
Status: DISCONTINUED | OUTPATIENT
Start: 2024-09-20 | End: 2024-09-20

## 2024-09-20 RX ORDER — DEXMEDETOMIDINE HYDROCHLORIDE 100 UG/ML
INJECTION, SOLUTION INTRAVENOUS
Status: DISCONTINUED | OUTPATIENT
Start: 2024-09-20 | End: 2024-09-20

## 2024-09-20 RX ORDER — ONDANSETRON HYDROCHLORIDE 2 MG/ML
INJECTION, SOLUTION INTRAVENOUS
Status: DISCONTINUED | OUTPATIENT
Start: 2024-09-20 | End: 2024-09-20

## 2024-09-20 RX ORDER — IBUPROFEN 600 MG/1
600 TABLET ORAL EVERY 6 HOURS
Qty: 28 TABLET | Refills: 0 | Status: SHIPPED | OUTPATIENT
Start: 2024-09-20 | End: 2024-09-27

## 2024-09-20 RX ORDER — METHOCARBAMOL 500 MG/1
500 TABLET, FILM COATED ORAL 3 TIMES DAILY
Qty: 20 TABLET | Refills: 0 | Status: SHIPPED | OUTPATIENT
Start: 2024-09-20 | End: 2024-09-27

## 2024-09-20 RX ORDER — MIDAZOLAM HYDROCHLORIDE 1 MG/ML
INJECTION INTRAMUSCULAR; INTRAVENOUS
Status: DISCONTINUED | OUTPATIENT
Start: 2024-09-20 | End: 2024-09-20

## 2024-09-20 RX ORDER — ROCURONIUM BROMIDE 10 MG/ML
INJECTION, SOLUTION INTRAVENOUS
Status: DISCONTINUED | OUTPATIENT
Start: 2024-09-20 | End: 2024-09-20

## 2024-09-20 RX ORDER — HYDROMORPHONE HYDROCHLORIDE 2 MG/ML
INJECTION, SOLUTION INTRAMUSCULAR; INTRAVENOUS; SUBCUTANEOUS
Status: DISCONTINUED | OUTPATIENT
Start: 2024-09-20 | End: 2024-09-20

## 2024-09-20 RX ORDER — OXYCODONE AND ACETAMINOPHEN 5; 325 MG/1; MG/1
1 TABLET ORAL
Status: DISCONTINUED | OUTPATIENT
Start: 2024-09-20 | End: 2024-09-20 | Stop reason: HOSPADM

## 2024-09-20 RX ORDER — SODIUM CHLORIDE 0.9 % (FLUSH) 0.9 %
10 SYRINGE (ML) INJECTION
Status: DISCONTINUED | OUTPATIENT
Start: 2024-09-20 | End: 2024-09-20 | Stop reason: HOSPADM

## 2024-09-20 RX ORDER — HYDROMORPHONE HYDROCHLORIDE 1 MG/ML
0.2 INJECTION, SOLUTION INTRAMUSCULAR; INTRAVENOUS; SUBCUTANEOUS EVERY 5 MIN PRN
Status: DISCONTINUED | OUTPATIENT
Start: 2024-09-20 | End: 2024-09-20 | Stop reason: HOSPADM

## 2024-09-20 RX ORDER — GLUCAGON 1 MG
1 KIT INJECTION
Status: DISCONTINUED | OUTPATIENT
Start: 2024-09-20 | End: 2024-09-20 | Stop reason: HOSPADM

## 2024-09-20 RX ORDER — SODIUM CHLORIDE 9 MG/ML
INJECTION, SOLUTION INTRAVENOUS CONTINUOUS
Status: DISCONTINUED | OUTPATIENT
Start: 2024-09-20 | End: 2024-09-20 | Stop reason: HOSPADM

## 2024-09-20 RX ORDER — LIDOCAINE HYDROCHLORIDE 10 MG/ML
INJECTION, SOLUTION EPIDURAL; INFILTRATION; INTRACAUDAL; PERINEURAL
Status: DISCONTINUED | OUTPATIENT
Start: 2024-09-20 | End: 2024-09-20 | Stop reason: HOSPADM

## 2024-09-20 RX ORDER — FENTANYL CITRATE 50 UG/ML
INJECTION, SOLUTION INTRAMUSCULAR; INTRAVENOUS
Status: DISCONTINUED | OUTPATIENT
Start: 2024-09-20 | End: 2024-09-20

## 2024-09-20 RX ORDER — TRIAMCINOLONE ACETONIDE 40 MG/ML
INJECTION, SUSPENSION INTRA-ARTICULAR; INTRAMUSCULAR
Status: DISCONTINUED | OUTPATIENT
Start: 2024-09-20 | End: 2024-09-20 | Stop reason: HOSPADM

## 2024-09-20 RX ORDER — CEFAZOLIN 2 G/1
INJECTION, POWDER, FOR SOLUTION INTRAMUSCULAR; INTRAVENOUS
Status: DISCONTINUED | OUTPATIENT
Start: 2024-09-20 | End: 2024-09-20

## 2024-09-20 RX ORDER — LIDOCAINE HYDROCHLORIDE 20 MG/ML
INJECTION INTRAVENOUS
Status: DISCONTINUED | OUTPATIENT
Start: 2024-09-20 | End: 2024-09-20

## 2024-09-20 RX ORDER — FENTANYL CITRATE 50 UG/ML
25 INJECTION, SOLUTION INTRAMUSCULAR; INTRAVENOUS EVERY 5 MIN PRN
Status: DISCONTINUED | OUTPATIENT
Start: 2024-09-20 | End: 2024-09-20 | Stop reason: HOSPADM

## 2024-09-20 RX ORDER — GABAPENTIN 100 MG/1
300 CAPSULE ORAL 3 TIMES DAILY
Qty: 63 CAPSULE | Refills: 0 | Status: SHIPPED | OUTPATIENT
Start: 2024-09-20 | End: 2024-09-27

## 2024-09-20 RX ORDER — PHENYLEPHRINE HYDROCHLORIDE 10 MG/ML
INJECTION INTRAVENOUS
Status: DISCONTINUED | OUTPATIENT
Start: 2024-09-20 | End: 2024-09-20

## 2024-09-20 RX ORDER — TRANEXAMIC ACID 100 MG/ML
INJECTION, SOLUTION INTRAVENOUS
Status: DISCONTINUED | OUTPATIENT
Start: 2024-09-20 | End: 2024-09-20 | Stop reason: HOSPADM

## 2024-09-20 RX ORDER — ESMOLOL HYDROCHLORIDE 10 MG/ML
INJECTION INTRAVENOUS
Status: DISCONTINUED | OUTPATIENT
Start: 2024-09-20 | End: 2024-09-20

## 2024-09-20 RX ORDER — KETAMINE HCL IN 0.9 % NACL 50 MG/5 ML
SYRINGE (ML) INTRAVENOUS
Status: DISCONTINUED | OUTPATIENT
Start: 2024-09-20 | End: 2024-09-20

## 2024-09-20 RX ORDER — ACETAMINOPHEN 10 MG/ML
INJECTION, SOLUTION INTRAVENOUS
Status: DISCONTINUED | OUTPATIENT
Start: 2024-09-20 | End: 2024-09-20

## 2024-09-20 RX ADMIN — SODIUM CHLORIDE: 9 INJECTION, SOLUTION INTRAVENOUS at 08:09

## 2024-09-20 RX ADMIN — ONDANSETRON 4 MG: 2 INJECTION INTRAMUSCULAR; INTRAVENOUS at 11:09

## 2024-09-20 RX ADMIN — HYDROMORPHONE HYDROCHLORIDE 0.5 MG: 2 INJECTION INTRAMUSCULAR; INTRAVENOUS; SUBCUTANEOUS at 01:09

## 2024-09-20 RX ADMIN — SODIUM CHLORIDE: 0.9 INJECTION, SOLUTION INTRAVENOUS at 01:09

## 2024-09-20 RX ADMIN — SUGAMMADEX 200 MG: 100 INJECTION, SOLUTION INTRAVENOUS at 01:09

## 2024-09-20 RX ADMIN — ESMOLOL HYDROCHLORIDE 10 MG: 100 INJECTION, SOLUTION INTRAVENOUS at 12:09

## 2024-09-20 RX ADMIN — MIDAZOLAM HYDROCHLORIDE 2 MG: 2 INJECTION, SOLUTION INTRAMUSCULAR; INTRAVENOUS at 11:09

## 2024-09-20 RX ADMIN — LIDOCAINE HYDROCHLORIDE 100 MG: 20 INJECTION INTRAVENOUS at 11:09

## 2024-09-20 RX ADMIN — DEXMEDETOMIDINE HYDROCHLORIDE 8 MCG: 100 INJECTION, SOLUTION INTRAVENOUS at 12:09

## 2024-09-20 RX ADMIN — PROPOFOL 200 MG: 10 INJECTION, EMULSION INTRAVENOUS at 11:09

## 2024-09-20 RX ADMIN — FENTANYL CITRATE 25 MCG: 50 INJECTION INTRAMUSCULAR; INTRAVENOUS at 02:09

## 2024-09-20 RX ADMIN — HYDROMORPHONE HYDROCHLORIDE 0.2 MG: 1 INJECTION, SOLUTION INTRAMUSCULAR; INTRAVENOUS; SUBCUTANEOUS at 03:09

## 2024-09-20 RX ADMIN — ONDANSETRON 4 MG: 2 INJECTION INTRAMUSCULAR; INTRAVENOUS at 02:09

## 2024-09-20 RX ADMIN — MUPIROCIN: 20 OINTMENT TOPICAL at 08:09

## 2024-09-20 RX ADMIN — ESMOLOL HYDROCHLORIDE 20 MG: 100 INJECTION, SOLUTION INTRAVENOUS at 12:09

## 2024-09-20 RX ADMIN — ACETAMINOPHEN 1000 MG: 10 INJECTION INTRAVENOUS at 11:09

## 2024-09-20 RX ADMIN — DEXMEDETOMIDINE HYDROCHLORIDE 4 MCG: 100 INJECTION, SOLUTION INTRAVENOUS at 12:09

## 2024-09-20 RX ADMIN — Medication 10 MG: at 01:09

## 2024-09-20 RX ADMIN — Medication 20 MG: at 11:09

## 2024-09-20 RX ADMIN — DEXAMETHASONE SODIUM PHOSPHATE 8 MG: 4 INJECTION INTRA-ARTICULAR; INTRALESIONAL; INTRAMUSCULAR; INTRAVENOUS; SOFT TISSUE at 11:09

## 2024-09-20 RX ADMIN — ROCURONIUM BROMIDE 50 MG: 10 INJECTION, SOLUTION INTRAVENOUS at 11:09

## 2024-09-20 RX ADMIN — SODIUM CHLORIDE, SODIUM GLUCONATE, SODIUM ACETATE, POTASSIUM CHLORIDE, MAGNESIUM CHLORIDE, SODIUM PHOSPHATE, DIBASIC, AND POTASSIUM PHOSPHATE: .53; .5; .37; .037; .03; .012; .00082 INJECTION, SOLUTION INTRAVENOUS at 12:09

## 2024-09-20 RX ADMIN — SODIUM CHLORIDE: 0.9 INJECTION, SOLUTION INTRAVENOUS at 11:09

## 2024-09-20 RX ADMIN — PHENYLEPHRINE HYDROCHLORIDE 100 MCG: 10 INJECTION INTRAVENOUS at 12:09

## 2024-09-20 RX ADMIN — OXYCODONE HYDROCHLORIDE AND ACETAMINOPHEN 1 TABLET: 5; 325 TABLET ORAL at 02:09

## 2024-09-20 RX ADMIN — FENTANYL CITRATE 100 MCG: 50 INJECTION, SOLUTION INTRAMUSCULAR; INTRAVENOUS at 11:09

## 2024-09-20 RX ADMIN — Medication 10 MG: at 12:09

## 2024-09-20 RX ADMIN — FENTANYL CITRATE 25 MCG: 50 INJECTION INTRAMUSCULAR; INTRAVENOUS at 03:09

## 2024-09-20 RX ADMIN — CEFAZOLIN 2 G: 2 INJECTION, POWDER, FOR SOLUTION INTRAMUSCULAR; INTRAVENOUS at 11:09

## 2024-09-20 NOTE — ANESTHESIA PREPROCEDURE EVALUATION
09/20/2024  Alisha Cardenas is a 33 y.o., female.      Pre-op Assessment    I have reviewed the Patient Summary Reports.     I have reviewed the Nursing Notes. I have reviewed the NPO Status.   I have reviewed the Medications.     Review of Systems  Anesthesia Hx:             Denies Family Hx of Anesthesia complications.    Denies Personal Hx of Anesthesia complications.                      Physical Exam  General: Well nourished    Airway:  Mallampati: II   Mouth Opening: Normal  TM Distance: Normal    Chest/Lungs:  Normal Respiratory Rate    Heart:  Rate: Normal    Anesthesia Plan  Type of Anesthesia, risks & benefits discussed:    Anesthesia Type: Gen ETT  Intra-op Monitoring Plan: Standard ASA Monitors  Post Op Pain Control Plan: multimodal analgesia  Induction:  IV  Airway Plan: Video  Informed Consent: Informed consent signed with the Patient and all parties understand the risks and agree with anesthesia plan.  All questions answered.   ASA Score: 1  Day of Surgery Review of History & Physical: H&P Update referred to the surgeon/provider.    Ready For Surgery From Anesthesia Perspective.   .

## 2024-09-20 NOTE — ANESTHESIA POSTPROCEDURE EVALUATION
Anesthesia Post Evaluation    Patient: Alisha Cardenas    Procedure(s) Performed: Procedure(s) (LRB):  MASTOPEXY (Right)  LIPOSUCTION, WITH FAT TRANSFER (N/A)    Final Anesthesia Type: general      Patient location during evaluation: PACU  Patient participation: Yes- Able to Participate  Level of consciousness: awake and alert  Post-procedure vital signs: reviewed and stable  Pain management: adequate  Airway patency: patent    PONV status at discharge: No PONV  Anesthetic complications: no      Cardiovascular status: stable  Respiratory status: spontaneous ventilation  Hydration status: euvolemic  Follow-up not needed.          Vitals Value Taken Time   /76 09/20/24 1447   Temp 36.3 °C (97.3 °F) 09/20/24 1403   Pulse 69 09/20/24 1449   Resp 15 09/20/24 1449   SpO2 98 % 09/20/24 1449   Vitals shown include unfiled device data.      Event Time   Out of Recovery 14:33:00         Pain/Rimma Score: Pain Rating Prior to Med Admin: 9 (9/20/2024  2:38 PM)  Rimma Score: 10 (9/20/2024  2:33 PM)

## 2024-09-20 NOTE — PLAN OF CARE
Discharge instructions reviewed with patient.Pt verbalizes understanding. Questions and concerns addressed.  PIV removed with catheter intact.  Patient denies any acute pain or discomfort. Escorted to vehicle via wheelchair.

## 2024-09-20 NOTE — PLAN OF CARE
Chart reviewed. Pre-op nursing care completed per orders. Safe surgery checklist complete. Pt belongings placed in PACU locker 15. Waiting for site robert, H&P and anesthesia consent prior to procedure. Call button within reach.

## 2024-09-20 NOTE — BRIEF OP NOTE
[facility] ([facility])  Brief Operative Note     SUMMARY     Surgery Date: 9/20/2024     Surgeons and Role:     * Mehran Street, DO - Primary    Assistant: Patrice Martin MD, Fellow and Erin Posadas MD, Fellow    Pre-op Diagnosis:  Carcinoma of upper-inner quadrant of left breast in female, estrogen receptor negative [C50.212, Z17.1]    Post-op Diagnosis:  Post-Op Diagnosis Codes:     * Carcinoma of upper-inner quadrant of left breast in female, estrogen receptor negative [C50.212, Z17.1]    Procedure(s) (LRB):  MASTOPEXY (Right)  LIPOSUCTION, WITH FAT TRANSFER (N/A)    Anesthesia: General    Description of the findings of the procedure: Right mastopexy for symmetry, lateral chest wall skin excision, liposuction of abdomen and flanks, fat grafting to bilateral breasts    Findings/Key Components: See operative report    Estimated Blood Loss: * No values recorded between 9/20/2024 12:03 PM and 9/20/2024  1:46 PM *         Specimens:   Specimen (24h ago, onward)       Start     Ordered    09/20/24 1318  Specimen to Pathology, Surgery Other  Once        Comments: Pre-op Diagnosis: Carcinoma of upper-inner quadrant of left breast in female, estrogen receptor negative [C50.212, Z17.1]Procedure(s):MASTOPEXYLIPOSUCTION, WITH FAT TRANSFER Number of specimens: 2Name of specimens: 1. RIGHT BREAST TISSUE WITH SKIN          2. LEFT BREAST TISSUE WITH SKIN     References:    Click here for ordering Quick Tip   Question Answer Comment   Procedure Type: Other    Specimen Class: Routine/Screening    Release to patient Immediate        09/20/24 1337                    Implants: * No implants in log *    Complications: None    Discharge Note    SUMMARY     Admit Date: 9/20/2024    Discharge Date and Time:  09/20/2024 1:47 PM    Hospital Course: Patient was placed in observation status for the above procedure.  See above.  Postoperatively was discharged home from the PACU once criteria was met.    Final  Diagnosis: Post-Op Diagnosis Codes:     * Carcinoma of upper-inner quadrant of left breast in female, estrogen receptor negative [C50.212, Z17.1]    Disposition: Home or Self Care    Follow Up/Patient Instructions:     Medications:  Reconciled Home Medications:      Medication List        ASK your doctor about these medications      INV albuterol 90 mcg inhalation  Inhale 2 puffs into the lungs as needed (PRN wheeze). 2 puffs inhaled PRN     IRON ORAL  Take by mouth as needed.            No discharge procedures on file.

## 2024-09-20 NOTE — TRANSFER OF CARE
"Anesthesia Transfer of Care Note    Patient: Alisha Cardenas    Procedure(s) Performed: Procedure(s) (LRB):  MASTOPEXY (Right)  LIPOSUCTION, WITH FAT TRANSFER (N/A)    Patient location: PACU    Anesthesia Type: general    Transport from OR: Transported from OR on 6-10 L/min O2 by face mask with adequate spontaneous ventilation    Post pain: adequate analgesia    Post assessment: no apparent anesthetic complications and tolerated procedure well    Post vital signs: stable    Level of consciousness: alert and awake    Nausea/Vomiting: no nausea/vomiting    Complications: none    Transfer of care protocol was followed      Last vitals: Visit Vitals  /73 (BP Location: Right arm, Patient Position: Lying)   Pulse 76   Temp 37.1 °C (98.8 °F) (Temporal)   Resp 16   Ht 5' 1" (1.549 m)   Wt 79.4 kg (175 lb)   SpO2 99%   Breastfeeding No   BMI 33.07 kg/m²     "

## 2024-09-20 NOTE — H&P
.Plastic and Reconstructive Surgery   H&P    Date:   2024    History of Present Illness:    33 y.o. female who presents for Second stage revision of breasts. Previous hx of Left breast Ca s/p bridge expander to delayed unilateral Left LETY 24. Plans today for Right mastopexy, liposcution to b/l breasts      The Patient denies any new medical diagnosis, hospitalizations, illness, or sickness recently since last clinic appointment.  There is no change in H&P since last office visit. Will proceed with planned procedure.      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    Past Surgical History:    has a past surgical history that includes none; Tonsillectomy; Dilation and curettage of uterus; Myomectomy (N/A, 2022); Mastectomy (Left, 2024); [place] lymph node biopsy (Left, 2024); Injection for sentinel node identification (Left, 2024); Mastectomy with sentinel node biopsy and axillary lymph node dissection (Left, 2024); Insertion of breast tissue expander (Left, 2024); Removal of breast implant (Left, 2024); and Reconstruction of breast with deep inferior epigastric artery  (LETY) free flap (Left, 2024).    Social History:  Social History     Tobacco Use    Smoking status: Never    Smokeless tobacco: Never   Substance Use Topics    Alcohol use: Yes     Comment: weekends     Social History     Substance and Sexual Activity   Drug Use No       Family History:  Family History   Problem Relation Name Age of Onset    No Known Problems Mother      No Known  "Problems Father      Vaginal cancer Neg Hx      Endometrial cancer Neg Hx      Cervical cancer Neg Hx         Allergies:  Review of patient's allergies indicates:   Allergen Reactions    Iodine Anaphylaxis    Shellfish containing products Anaphylaxis       Home Medications:  Scheduled Meds:   LIDOcaine (PF) 10 mg/ml (1%)  1 mL Intradermal Once     Continuous Infusions:   0.9% NaCl   Intravenous Continuous 50 mL/hr at 24 08 New Bag at 24 0822     PRN Meds:.  Current Facility-Administered Medications:     ceFAZolin (Ancef) IV (PEDS and ADULTS), 2 g, Intravenous, On Call Procedure    mupirocin, , Nasal, On Call Procedure    sodium chloride 0.9%, 10 mL, Intravenous, PRN      Review of Systems:  Negative except for what is noted in HPI    Physical Exam:  VITAL SIGNS:   Vitals:    24 0816 24   BP: 115/73    BP Location: Right arm    Patient Position: Lying    Pulse: 76    Resp: 16    Temp: 98.8 °F (37.1 °C)    TempSrc: Temporal    SpO2: 100% 99%   Weight: 79.4 kg (175 lb)    Height: 5' 1" (1.549 m)      TMAX: Temp (24hrs), Av.8 °F (37.1 °C), Min:98.8 °F (37.1 °C), Max:98.8 °F (37.1 °C)      General: Alert; No acute distress  Cardiovascular: Regular rate   Respiratory: Normal respiratory effort. Chest rise symmetric.   Abdomen: Soft, nontender, nondistended  Extremity: Moves all extremities equally.  Neurologic: No focal deficit. Speech normal  Breast exam deferred         Diagnostic Data:  No results found for this or any previous visit (from the past 336 hour(s)).  No results found for this or any previous visit (from the past 336 hour(s)).  Lab Results   Component Value Date    ALBUMIN 3.7 2024     No results found for: "CRP"  No results found for: "INR", "PROTIME"  No results found for: "PTT"    Microbiology Results (last 7 days)       ** No results found for the last 168 hours. **            Assessment:  33 y.o.female w/ previous left bridge expander to unilateral LETY 24 " now here for 2nd stage revision    Plan:  Plan for Rt mastopexy, fat grafting to b/l breast in OR  Consent obtained    Discussed with patient and/or family the risks and benefits of surgical intervention.  Conservative measures and alternative options were discussed and  the patient and/or family would like to proceed with surgery.      We have discussed risks, which include but are not limited to bleeding, hematoma, seroma, infection, damage to nearby structures, blood clots in the legs that can travel to the lungs (pulmonary embolism). Pulmonary embolism can cause shortness of breath, chest pain, and even shock. Other risks include urinary tract infection, nausea and vomiting (usually related to pain medication), chronic pain, nerve damage, blood vessel injury, scarring and infection, which can require re-operation. Furthermore, the risks of anesthesia include potential heart, lung, kidney, and liver damage.  Informed consent was obtained.  The patient and or family understands and would like to proceed with surgery.    Erin Posadas MD  Plastic and Reconstructive Surgery Fellow

## 2024-09-20 NOTE — ANESTHESIA PROCEDURE NOTES
Intubation    Date/Time: 9/20/2024 11:42 AM    Performed by: Megan Chung CRNA  Authorized by: Leonel Harding MD    Intubation:     Induction:  Intravenous    Intubated:  Postinduction    Mask Ventilation:  Easy mask    Attempts:  1    Attempted By:  CRNA    Method of Intubation:  Video laryngoscopy    Blade:  Ellis 3    Laryngeal View Grade: Grade I - full view of cords      Difficult Airway Encountered?: No      Complications:  None    Airway Device:  Oral endotracheal tube    Airway Device Size:  7.0    Style/Cuff Inflation:  Cuffed (inflated to minimal occlusive pressure)    Tube secured:  21    Secured at:  The lips    Placement Verified By:  Capnometry    Complicating Factors:  None    Findings Post-Intubation:  BS equal bilateral and atraumatic/condition of teeth unchanged

## 2024-09-20 NOTE — DISCHARGE INSTRUCTIONS
Plastic Surgery Discharge Instructions  Keyshawn Street DO    Wound Care  1. Shower daily beginning 2 days after surgery  2. Okay to let warm soapy water run over the wound at that time  3. Do not submerge wounds in the bath  4. Leave any skin glue or mesh tape in place    Drain Care  If you have drains, strip tubing and record output when drain bulb becomes half full, or at least twice daily  Record output for each drain and bring to our follow up appointment    Diet  Regular Diet    Activity  Light activity only - no lifting greater than 10 lbs  Avoid strenuous activity that would cause you to get hot or sweaty    Medications  You have been given a prescription for narcotic pain medication, anti-inflammatory pain, muscle relaxer, and nerve pain  Unless otherwise contraindicated by your doctor, take 2 extra strength Tylenol and 600mg of ibuprofen every 8 hours  Please take medications as prescribed     What to call for:  1. Sustained fever > 101.0  2. Changes in color, sensation, temperature or pain at surgical site  3. Redness and/or drainage from the surgical site  4. Any reaction to prescribed medications  5. Continuous bloody drainage from surgical drains  6. Wound vac malfunction  7. Questions related to the procedure    Follow-up  1. Please call [phone] to schedule or confirm your follow up visit at the [facility], [address]  2. Call with any questions or concerns.  2. After hours call [phone] - ask to speak with the Plastic Surgery fellow on call

## 2024-09-24 ENCOUNTER — OFFICE VISIT (OUTPATIENT)
Dept: PLASTIC SURGERY | Facility: CLINIC | Age: 34
End: 2024-09-24
Payer: COMMERCIAL

## 2024-09-24 VITALS — SYSTOLIC BLOOD PRESSURE: 108 MMHG | HEART RATE: 73 BPM | DIASTOLIC BLOOD PRESSURE: 76 MMHG

## 2024-09-24 DIAGNOSIS — Z09 SURGERY FOLLOW-UP: Primary | ICD-10-CM

## 2024-09-24 LAB
FINAL PATHOLOGIC DIAGNOSIS: NORMAL
GROSS: NORMAL
Lab: NORMAL

## 2024-09-24 PROCEDURE — 99024 POSTOP FOLLOW-UP VISIT: CPT | Mod: S$GLB,,, | Performed by: SURGERY

## 2024-09-24 PROCEDURE — 99999 PR PBB SHADOW E&M-EST. PATIENT-LVL III: CPT | Mod: PBBFAC,,, | Performed by: SURGERY

## 2024-09-24 NOTE — PROGRESS NOTES
Alisha Cardenas presents to Plastic Surgery Clinic for a follow up visit status post Right mastopexy for symmetry, lateral chest wall skin excision, liposuction of abdomen and flanks, fat grafting to bilateral breasts on 9/20/24. Patient is doing well today with no issues. She is sore from the liposuction, as expected.    PHYSICAL EXAMINATION  Right breast incision(s) well approximated with no issues. Areolar sutures in place. Left lateral chest wall incision CDI with prineo tape in place.  Lipo sites of abdomen with nylon sutures in place.  Light bruising as expected.      ASSESSMENT/PLAN  Alisha Cardenas is s/p second stage revision with right breast mastopexy for symmetry  - Areolar steri strips and sutures removed. Discussed appearance of breast post operatively is swollen with nipple in lower position than final result and this will continue to change as the breast settles over the next several months.  - Follow up 2 weeks for prineo tape removal      All questions were answered. The patient was advised to contact the clinic with any questions or concerns prior to their next visit.       Nissa Adhikari PA-C  Plastic and Reconstructive Surgery

## 2024-09-26 ENCOUNTER — OFFICE VISIT (OUTPATIENT)
Dept: HEMATOLOGY/ONCOLOGY | Facility: CLINIC | Age: 34
End: 2024-09-26
Payer: COMMERCIAL

## 2024-09-26 VITALS
HEIGHT: 61 IN | RESPIRATION RATE: 18 BRPM | OXYGEN SATURATION: 100 % | BODY MASS INDEX: 33.04 KG/M2 | HEART RATE: 82 BPM | SYSTOLIC BLOOD PRESSURE: 119 MMHG | DIASTOLIC BLOOD PRESSURE: 79 MMHG | TEMPERATURE: 98 F | WEIGHT: 175 LBS

## 2024-09-26 DIAGNOSIS — I89.0 LYMPHEDEMA OF LEFT ARM: ICD-10-CM

## 2024-09-26 DIAGNOSIS — C50.212 CARCINOMA OF UPPER-INNER QUADRANT OF LEFT BREAST IN FEMALE, ESTROGEN RECEPTOR NEGATIVE: Primary | ICD-10-CM

## 2024-09-26 DIAGNOSIS — Z17.1 CARCINOMA OF UPPER-INNER QUADRANT OF LEFT BREAST IN FEMALE, ESTROGEN RECEPTOR NEGATIVE: Primary | ICD-10-CM

## 2024-09-26 PROCEDURE — G2211 COMPLEX E/M VISIT ADD ON: HCPCS | Mod: S$GLB,,, | Performed by: STUDENT IN AN ORGANIZED HEALTH CARE EDUCATION/TRAINING PROGRAM

## 2024-09-26 PROCEDURE — 99999 PR PBB SHADOW E&M-EST. PATIENT-LVL III: CPT | Mod: PBBFAC,,, | Performed by: STUDENT IN AN ORGANIZED HEALTH CARE EDUCATION/TRAINING PROGRAM

## 2024-09-26 PROCEDURE — 99214 OFFICE O/P EST MOD 30 MIN: CPT | Mod: S$GLB,,, | Performed by: STUDENT IN AN ORGANIZED HEALTH CARE EDUCATION/TRAINING PROGRAM

## 2024-09-26 NOTE — PROGRESS NOTES
[facility]/ [facility]      Chief Complaint: TNBC      Cancer Staging   Carcinoma of upper-inner quadrant of left breast in female, estrogen receptor negative  Staging form: Breast, AJCC 8th Edition  - Clinical stage from 2024: Stage IIB (cT2, cN0, cM0, G3, ER-, CA-, HER2-) - Signed by Vannessa Barney MD on 2024      HPI:  Alisha Cardenas is a 32yo woman who presents today for evaluation of newly diagnosed breast cancer. Her oncologic history is as follows:    -Presented w palpable L breast mass that she first noticed around [facility]. Diagnostic MMG 23 reveals a 34 x 33 x 32mm in the L breast at 10oclock. Enlarged axillary LN measuring 18 x 15 x 14mm. No concerning Rt breast findings. Subsequent biopsy showing poorly differentiated invasive carcinoma, grade 3. ER negative, CA negative, Her2 0, Ki67 >90%. LN negative for metastatic carcinoma.   -MRI breast 1/10/23 revealed left breast upper inner mass measuring 4.7 cm craniocaudal x 4.6 cm AP x 4.0 cm   -s/p genetic testing that was negative for clinically significant mutation; VUS in CTNNA1  - 24 Underwent total left mastectomy. 5.5cm grade 3 IDC. Closest margin 2mm on posterior aspect. 0/17 LN.  -met with radiation oncology; decided to forego adjuvant radiation  -2024 underwent L breast reconstruction  -s/p Rt breast mastoplexy for symmetry    Denies significant pmh. FH notable for a cousin dx with breast cancer at 34yo and aunt with breast cancer. She lives in [place] with her 21yo niece. Works in childcare.     Gyn History:   Menarche: 8yo  Menopause: N/A LMP 2023     : No  HRT: No    Interval History:  Ms. Cardenas returns today for follow up. She recently underwent Rt breast mastoplexy for symmetry. Notes that she is healing well and denies breast changes. She has noticed recent LUE lymphedema; first noticed this after a recent flight. Has an  "appt w physical therapy coming up. No new concerns today otherwise.         Patient Active Problem List   Diagnosis    Acute anemia    Viral syndrome    S/P abdominal myomectomy    Carcinoma of upper-inner quadrant of left breast in female, estrogen receptor negative    Lymphedema of left arm    Impaired functional mobility and activity tolerance    Post-mastectomy lymphedema syndrome       Current Outpatient Medications   Medication Instructions    acetaminophen (TYLENOL) 1,000 mg, Oral, Every 8 hours    albuterol sulfate (INV ALBUTEROL) 90 mcg inhalation 2 puffs, Inhalation, As needed (PRN), 2 puffs inhaled PRN    ferrous sulfate (IRON ORAL) Oral, As needed (PRN)    gabapentin (NEURONTIN) 300 mg, Oral, 3 times daily    ibuprofen (ADVIL,MOTRIN) 600 mg, Oral, Every 6 hours    methocarbamoL (ROBAXIN) 500 mg, Oral, 3 times daily    oxyCODONE (ROXICODONE) 5 mg, Oral, Every 4 hours PRN       Review of Systems:   Answers submitted by the patient for this visit:  Review of Systems Questionnaire (Submitted on 9/23/2024)  appetite change : No  unexpected weight change: No  mouth sores: No  visual disturbance: No  cough: No  shortness of breath: No  chest pain: No  abdominal pain: No  diarrhea: No  frequency: No  back pain: No  rash: No  headaches: No  adenopathy: Yes  nervous/ anxious: Yes      PHYSICAL EXAM:  /79   Pulse 82   Temp 98 °F (36.7 °C) (Oral)   Resp 18   Ht 5' 1" (1.549 m)   Wt 79.4 kg (175 lb)   SpO2 100%   BMI 33.07 kg/m²     ECOG 0  General: well appearing, in no apparent distress  HEENT: Normocephalic, EOMI, anicteric sclerae, MMM  Heart: well-perfused  Lungs: no increased wob  Breast: deferred today (previously: L breast reconstruction well-healed. No Rt breast masses)  Abdomen: Soft, nontender, nondistended .  Extremities: No LE edema or joint effusion  Skin: warm, well-perfused, no rash  Neurologic: Alert and oriented x 4, normal speech and gait   Psychiatric: Conversing appropriately with " providers throughout today's encounter.      Pertinent Labs & Imaging:  Reviewed all recent labs, imaging and pathology.     Assessment & Plan:  Alisha is a can 32yo woman with recently diagnosed at least Stage IIB TNBC (cT2N0) who presents today for evaluation.    She was previously conseled regarding recommendations for her TNBC measuring 5.5cm, but she opted against chemotherapy and chose to undergo surgery upfront. Following her L mastectomy in February, she again opted to forego chemotherapy and radiation in favor of pursuing reconstruction sooner. Today she is s/p L breast reconstruction. We explained that she is outside of the optimal window to begin chemotherapy and recommended clinical monitoring, to which the patient agreed. She requested mammography screening every 3 months and asked about ultrasound screening on a similar schedule to monitor the right breast for cancer. We discussed pursuing a high-risk screening protocol of mammogram and breast MRI annually alternating every 6 months. We counseled her extensively about possible symptoms of recurrence, including that the presence of symptoms can vary widely depending on location.     The patient reports she has met with radiation oncology and opted against radiation. We will continue to monitor clinically with mammogram and MRI yearly (Rt breast) and clinic visits every 3-4 months.     #TNBC:   --S/p flap reconstruction of the L breast on 4/1; more recently s/p Rt breast mastoplexy  --Will need yearly mammogram and breast MRI alternating every 6 months for high-risk screening protocol (MRI scheduled 12/2024, MMG in 6/2025  --surveillance q3-4mo for the first 5 years and annually thereafter     #L arm lymphedema:  --referral previously placed for PT eval, set up for this 10/4    All questions were answered to her apparent satisfaction. Will see her back in 3 months or sooner should the need arise.        Route Chart for Scheduling    Med Onc Chart  Routing      Follow up with physician . Scheduled   Follow up with [facility]    Infusion scheduling note    Injection scheduling note    Labs    Imaging    Pharmacy appointment    Other referrals                           Vannessa Barney MD     MDM includes  :    - Acute or chronic illness or injury that poses a threat to life or bodily function  - Review of prior external notes from unique source  - Independent review and explanation of 3+ results from unique tests  - Discussion of management and ordering 3+ unique tests  - Extensive discussion of treatment and management  - Prescription drug management  - Drug therapy requiring intensive monitoring for toxicity

## 2024-09-30 ENCOUNTER — TELEPHONE (OUTPATIENT)
Dept: PLASTIC SURGERY | Facility: CLINIC | Age: 34
End: 2024-09-30
Payer: COMMERCIAL

## 2024-09-30 ENCOUNTER — PATIENT MESSAGE (OUTPATIENT)
Dept: PLASTIC SURGERY | Facility: CLINIC | Age: 34
End: 2024-09-30
Payer: COMMERCIAL

## 2024-10-04 ENCOUNTER — CLINICAL SUPPORT (OUTPATIENT)
Dept: REHABILITATION | Facility: HOSPITAL | Age: 34
End: 2024-10-04
Payer: COMMERCIAL

## 2024-10-04 DIAGNOSIS — I97.2 POST-MASTECTOMY LYMPHEDEMA SYNDROME: ICD-10-CM

## 2024-10-04 DIAGNOSIS — I89.0 LYMPHEDEMA OF LEFT ARM: Primary | ICD-10-CM

## 2024-10-04 DIAGNOSIS — Z74.09 IMPAIRED FUNCTIONAL MOBILITY AND ACTIVITY TOLERANCE: ICD-10-CM

## 2024-10-04 DIAGNOSIS — Z09 SURGERY FOLLOW-UP: Primary | ICD-10-CM

## 2024-10-04 DIAGNOSIS — M25.612 DECREASED ROM OF LEFT SHOULDER: ICD-10-CM

## 2024-10-04 PROCEDURE — 97535 SELF CARE MNGMENT TRAINING: CPT

## 2024-10-04 PROCEDURE — 97110 THERAPEUTIC EXERCISES: CPT

## 2024-10-04 PROCEDURE — 97164 PT RE-EVAL EST PLAN CARE: CPT

## 2024-10-04 NOTE — PATIENT INSTRUCTIONS
Start with hands on your forehead. As your mobility improves, you can move your hands behind your head as pictured.

## 2024-10-04 NOTE — PLAN OF CARE
[facility]  Physical Therapy Re-Evaluation    Name: Alisha Cardenas  Clinic Number: [id number]    Therapy Diagnosis:   Encounter Diagnoses   Name Primary?    Lymphedema of left arm Yes    Impaired functional mobility and activity tolerance     Post-mastectomy lymphedema syndrome     Decreased ROM of left shoulder         Physician: Mariela Tan NP    Physician Orders: PT Eval and Treat - Lymphedema  Medical Diagnosis from Referral: Lymphedema of left arm [I89.0], Carcinoma of upper-inner quadrant of left breast in female, estrogen receptor negative [C50.212, Z17.1], H/O left mastectomy   Evaluation Date: 10/4/2024  Authorization Period Expiration: 9/6/25  Plan of Care Expiration: 12/6/24  Progress Note Due: 11/1/24  Visit # / Visits authorized: 1/20 (plus evaluation)   FOTO: 1/3    Precautions: Standard and cancer Can't Lift anything over 10lbs for 1 more month (11/20/24)    Time In: 12:37 PM  Time Out: 1:35 PM  Total Appointment Time (timed & untimed codes): 58 minutes      History   History of Present Illness: Alisha is a 33 y.o. female that presents to  [facility] at the [facility] clinic s/p L breast surgery. Pt with history of left triple negative breast cancer. She is s/p L TM with TE and ALND on 2/21/24 and s/p LETY reconstruction on 4/1/24. Pt is now s/p mastopexy to improve symmetry of right breat on 9/20/24. She did not have drains. She has 10# lifting limit for 1 more month.    Diagnosis: Lymphedema of left arm [I89.0], Carcinoma of upper-inner quadrant of left breast in female, estrogen receptor negative [C50.212, Z17.1], H/O left mastectomy   Chief complaint: pt states she still has swelling intermittently in her left UE. She does find the EdemaWear helps with swelling, but she doesn't like wearing it during the day- she wears to sleep at times    Surgical History:  Alisha Cardenas  has a past surgical history that includes  none; Tonsillectomy; Dilation and curettage of uterus; Myomectomy (N/A, 2022); Mastectomy (Left, 2024); Macy lymph node biopsy (Left, 2024); Injection for sentinel node identification (Left, 2024); Mastectomy with sentinel node biopsy and axillary lymph node dissection (Left, 2024); Insertion of breast tissue expander (Left, 2024); Removal of breast implant (Left, 2024); Reconstruction of breast with deep inferior epigastric artery  (LETY) free flap (Left, 2024); Mastopexy (Right, 2024); and Liposuction w/ fat injection (N/A, 2024).    Chemotherapy: none  Radiation: none  Endocrine therapy: none    Past Medical History:   Diagnosis Date    Asthma     reports as a child    Astigmatism of left eye     Breast cancer     Left    Complete spontaneous  2014 9:22:39 AM    [facility] - [facility]: , spontaneous, complete-No Additional Notes    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 2014 9:22:57 AM    [facility] - [facility]: , habitual w/o pregnancy-No Additional Notes    Missed  2014 9:22:49 AM    [facility] - [facility]: , Missed, Less than 22 Weeks-No Additional Notes    Prior miscarriage with pregnancy in first trimester, antepartum     Prior miscarriage with pregnancy in first trimester, antepartum     Prior miscarriage with pregnancy in second trimester, antepartum           Medications:  Alisha has a current medication list which includes the following prescription(s): inv albuterol, ferrous sulfate, gabapentin, and oxycodone.    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 Intermittent swelling  in her LUE. Decreased left shoulder ROM since surgery.  Exercise routine prior to onset: pt was walking in the park for an hour, 3 days/week and she does Emily on the TV if it's raining. Wants to try walking again this weekend.  Hand dominance: right      Patient's Goals: for swelling to go away        Subjective   Pt states: Intermittent pain in her left arm- tenderness when swollen  Pain: 0/10 on VAS currently.   Best: 1/10  Worst: 7/10   Pain location: left hand   Objective   Mental status :A+O x 3    Postural examination/scapular alignment:  no major deviations    Skin Integrity:   Scar Location: Left breast and abdoment  Appearance: healed  Signs of infection: No  Drainage: N/A  Color:N/A    Edema: Mild- stage 1 lymphedema  Location: Left forearm and hand- it is noted, skin is soft, no pitting present  (-) Stemmer sign      Axillary Web Syndrome/Cording:   Location: TBA  Degree of Cording: TBA (mild, moderate etc...)   Number of cords present: TBA    Sensation: WNL         Range of Motion:   Shoulder Range of Motion:   Active /Passive ROM Right Left   Flexion 145 110   Abduction 178 90   Extension 58 65   IR T9 T9   ER T1 C7          Strength: to be assessed as appropriate    Baseline Measurements of BL UE's for early detection of Lymphedema:     LANDMARK RIGHT UE LEFT UE DIFF   E + 8" 38 cm 37.5 cm 0.5 cm   E + 6" 37 cm 36 cm 1 cm   E + 4" 34 cm 33.5 cm 0.5 cm   E + 2" 31 cm 32 cm 1 cm   Elbow 26 cm 25 cm 1 cm   W+ 8" 26 cm 25.5 cm 0.5 cm   W +  6" 23 cm 24.5 cm 1.5 cm   W + 4" 19.5 cm 20 cm 0.5 cm   Wrist 15.5 cm 16.5 cm 1 cm   DPC 18.5 cm 18.5 cm 0.5 cm   IP Thumb 6 cm 6 cm 0 cm     Mild swelling noted in her left hand- harder to visualize the veins and tendons    Functional Mobility (Bed mobility, transfers)  Mod I to " independent    ADL's (Activities of Daily Living):  independent    Gait Assessment:   - Assistive device used: none  - Assistance: independent  - Distance: community distances     Patient Education Provided   - role of therapy in multi - disciplinary team, goals for therapy  - plan of care, scheduling      Pt has no cultural, educational or language barriers to learning provided.  Treatment and Instruction of Home Exercise Program      Total Time Separate from Evaluation: 40 minutes      Alisha received therapeutic exercises to develop ROM, posture, and lymphatic motility for 25minutes including:     Pulleys, 2 min each of flexion and scaption  Scapular retractions x 10 reps  Butterflies  x 10 reps  LTR with hands on forehead, 10 reps B  Attempted shoulder flexion with wand, but pt reports discomfort  Shoulder flexion wall slide, 10 reps  Shoulder ABD wall slide x 10 reps    Alisha received the following self-care/home management techniques for 15 minutes, including:    - Reviewed components of complete decongestive therapy, timing for getting a compression garment, and encouraged increase wear time during the day for Edema Wear. Reviewed Edema Wear guidelines.    Written Home Exercises Provided: yes.  Exercises were reviewed and Alisha was able to demonstrate them prior to the end of the session.  lAisha demonstrated good  understanding of the education provided.     See EMR under Patient Instructions for exercises provided 9/10/2024 and 10/4/24            Assessment   This is a 33 y.o. female referred to outpatient physical therapy and presents with a medical diagnosis of Lymphedema of left arm [I89.0], Carcinoma of upper-inner quadrant of left breast in female, estrogen receptor negative [C50.212, Z17.1], H/O left mastectomy. Pt returns after recent mastopexy. She has been cleared by plastics to resume PT. Pt presents with signs and symptoms of Stage 1 lymphedema in her left hand and forearm, and she has  decreased shoulder ROM, which impacts her functional mobility.  Pt verbalizes understanding of all education today, but she may need review. Adding ROM goals to Plan of Care, but otherwise, her existing plan of care remains appropriate.     Pt instructed in HEP this session and was able to perform all exercises given independently. Pt instructed to follow up with therapist if any concerns arise with program established. Pt will continue to benefit from skilled physical therapy to address the impairments stated in chart below, provide patient/family education and to maximize pt's level of independence in home and community environment     Pt prognosis is Good.    Anticipated barriers to physical therapy: upcoming mastopexy     Pt's spiritual, cultural and educational needs considered and pt agreeable to plan of care and goals as stated below:       Medical Necessity is demonstrated by the following:  History  Co-morbidities and personal factors that may impact the plan of care [] LOW: no personal factors / co-morbidities  [x] MODERATE: 1-2 personal factors / co-morbidities  [] HIGH: 3+ personal factors / co-morbidities    Moderate / High Support Documentation:   Co-morbidities affecting plan of care: breast cancer    Personal Factors:   no deficits     Examination  Body Structures and Functions, activity limitations and participation restrictions that may impact the plan of care [] LOW: addressing 1-2 elements  [x] MODERATE: 3+ elements  [] HIGH: 4+ elements (please support below)    Moderate / High Support Documentation: lymphedema, pain, decreased functional activity tolerance     Clinical Presentation [] LOW: stable  [x] MODERATE: Evolving  [] HIGH: Unstable     Decision Making/ Complexity Score: moderate       Goals: Pt agrees with goals set    Short Term Goals (6 weeks)  1. Pt will demo decrease in girth in left upper extremity by >/= 1cm to allow for improved UE symmetry, clothing choice, ROM, and UE function.  (progressing, not met)  2. Patient will demonstrate 100% knowledge of lymphedema precautions and signs of infection to allow for reduced risk of lymphedema, reduced risk of infection, and/or exacerbation.  (progressing, not met)  3. Patient will perform self lymph drainage techniques to enhance lymphatic drainage and mobility.  (progressing, not met)  4. Patient will tolerate daily activities with multilayered bandaging to enhance lymphatic drainage and skin elasticity.  (progressing, not met)  5. Pt will tolerate HEP for improved strength, functional mobility, ROM, posture, and endurance. (progressing, not met)  6. Pt will demo self-bandaging technique with Mod I assist to facilitate carryover with compression between therapy session and to maximize outcomes (progressing, not met)  7. Pt will increase AROM/PROM in shoulder abduction ROM to >/=130 degrees on left to improve functional reach, carry, push, pull pain free. (progressing, not met)  8. Pt will increase AROM/PROM in shoulder flexion to >/=140 degrees on left to improve functional reach, carry, push, pull pain free.(progressing, not met)       Long Term Goals: ( 12  weeks)  1. Patient to show decreased girth in left upper extremity by >/= 2cm to allow for improved UE symmetry, clothing choice, ROM, and UE function.  (progressing, not met)  2. Patient to shola/doff compression garment with daily compliance to support lymphatic mobility and skin elasticity.  (progressing, not met)  3. Pt to show improved postural awareness and alignment for improved functional mobility.  (progressing, not met)  4. Pt to be independent and compliant with HEP to allow for improved ROM, reach and carry with functional endurance and strength.    (progressing, not met)  5. Pt will increase AROM/PROM in shoulder abduction ROM to >/=170 degrees on left to improve functional reach, carry, push, pull pain free. (progressing, not met)  6. Pt will increase AROM/PROM in shoulder flexion  to >/=170 degrees bilaterally to improve functional reach, carry, push, pull pain free.(progressing, not met)    Plan   Outpatient physical therapy 2x week for 12 weeks to include the following:   Manual Therapy, Neuromuscular Re-ed, Orthotic Management and Training, Patient Education, Self Care, Therapeutic Activities, and Therapeutic Exercise. complete decongestive therapy      Plan of care Certification Period: 10/4/2024 to 12/6/24.    Therapist: Marialuisa Schuler, PT  10/4/2024

## 2024-10-08 ENCOUNTER — OFFICE VISIT (OUTPATIENT)
Dept: PLASTIC SURGERY | Facility: CLINIC | Age: 34
End: 2024-10-08
Payer: COMMERCIAL

## 2024-10-08 VITALS
HEIGHT: 61 IN | SYSTOLIC BLOOD PRESSURE: 107 MMHG | HEART RATE: 81 BPM | DIASTOLIC BLOOD PRESSURE: 74 MMHG | BODY MASS INDEX: 33.07 KG/M2

## 2024-10-08 DIAGNOSIS — Z09 SURGERY FOLLOW-UP: Primary | ICD-10-CM

## 2024-10-08 PROBLEM — M25.612 DECREASED ROM OF LEFT SHOULDER: Status: ACTIVE | Noted: 2024-10-08

## 2024-10-08 PROCEDURE — 99999 PR PBB SHADOW E&M-EST. PATIENT-LVL III: CPT | Mod: PBBFAC,,, | Performed by: PHYSICIAN ASSISTANT

## 2024-10-08 PROCEDURE — 99024 POSTOP FOLLOW-UP VISIT: CPT | Mod: S$GLB,,, | Performed by: PHYSICIAN ASSISTANT

## 2024-10-08 NOTE — PROGRESS NOTES
Alisha Cardenas presents to Plastic Surgery Clinic for a follow up visit status post Right mastopexy for symmetry, lateral chest wall skin excision, liposuction of abdomen and flanks, fat grafting to bilateral breasts on 9/20/24. Here today for routine follow up. She removed the prineo tape since her last visit as it was coming loose. She is applying aquaphor ointment to her incisions.     PHYSICAL EXAMINATION  Right breast incision(s) well healing, good symmetry. The left upper pole with a little more fullness than the right. Nipple position is good.  Abdominal donor site improved in contour  Resolution of bruising        ASSESSMENT/PLAN  Alisha Cardenas is s/p second stage revision with right breast mastopexy for symmetry  - She is healing well with no concerns  - Counseled on scar care  - Follow up 3 months, will get post op photos then        All questions were answered. The patient was advised to contact the clinic with any questions or concerns prior to their next visit.         Nissa Adhikari PA-C  Plastic and Reconstructive Surgery

## 2024-10-16 ENCOUNTER — CLINICAL SUPPORT (OUTPATIENT)
Dept: REHABILITATION | Facility: HOSPITAL | Age: 34
End: 2024-10-16
Payer: COMMERCIAL

## 2024-10-16 DIAGNOSIS — I89.0 LYMPHEDEMA OF LEFT ARM: Primary | ICD-10-CM

## 2024-10-16 DIAGNOSIS — M25.612 DECREASED ROM OF LEFT SHOULDER: ICD-10-CM

## 2024-10-16 DIAGNOSIS — I97.2 POST-MASTECTOMY LYMPHEDEMA SYNDROME: ICD-10-CM

## 2024-10-16 DIAGNOSIS — Z09 SURGERY FOLLOW-UP: ICD-10-CM

## 2024-10-16 DIAGNOSIS — Z74.09 IMPAIRED FUNCTIONAL MOBILITY AND ACTIVITY TOLERANCE: ICD-10-CM

## 2024-10-16 PROCEDURE — 97535 SELF CARE MNGMENT TRAINING: CPT

## 2024-10-16 PROCEDURE — 97110 THERAPEUTIC EXERCISES: CPT

## 2024-10-16 PROCEDURE — 97140 MANUAL THERAPY 1/> REGIONS: CPT

## 2024-10-16 NOTE — PROGRESS NOTES
Physical Therapy Daily Treatment Note       Date: 10/16/2024   Name: Ailsha Cardenas  Clinic Number: [id number]    Therapy Diagnosis:   Encounter Diagnoses   Name Primary?    Surgery follow-up     Lymphedema of left arm Yes    Impaired functional mobility and activity tolerance     Post-mastectomy lymphedema syndrome     Decreased ROM of left shoulder      Physician: Mariela Tan, NP    Physician Orders: PT Eval and Treat - Lymphedema  Medical Diagnosis from Referral: Lymphedema of left arm [I89.0], Carcinoma of upper-inner quadrant of left breast in female, estrogen receptor negative [C50.212, Z17.1], H/O left mastectomy   Evaluation Date: 10/4/2024  Authorization Period Expiration: 12/31/24  Plan of Care Expiration: 12/6/24  Progress Note Due: 11/1/24  Visit # / Visits authorized: 2/20 (plus evaluation)   FOTO: 1/3     Precautions: Standard and cancer Can't Lift anything over 10lbs for 1 more month (11/20/24)      Time In: 3:45 PM  Time Out: 4:40 PM  Total Appointment Time (timed & untimed codes): 55 minutes      Subjective     Pt reports: pt states swelling is about the same. She has been using the Edema Wear sometimes, but not a lot.  Pt has not ordered an arthritis compression glove.   She was partially compliant with home exercise program.  Response to previous treatment: no adverse reaction  Pain Scale: Alisha rates pain on a scale of 0/10 on VAS.   Pain location: N/A     Fatigue: none stated  Functional change: unsure  Treatment:    Chemotherapy: none  Radiation: none  Endocrine therapy: none  Surgery date: Pt with history of left triple negative breast cancer. She is s/p L TM with TE and ALND on 2/21/24 and s/p LETY reconstruction on 4/1/24. Pt is now s/p mastopexy to improve symmetry of right breat on 9/20/24.       Objective     Objective Measures updated at progress report unless specified.       LANDMARK RIGHT UE LEFT UE DIFF LUE Diff   Date  "Eval Eval Eval 10/16/24 10/16/24   E + 8" 38 cm 37.5 cm 0.5 cm 36.5 cm -1   E + 6" 37 cm 36 cm 1 cm 36 cm No chg   E + 4" 34 cm 33.5 cm 0.5 cm 33 cm -0.5   E + 2" 31 cm 32 cm 1 cm 31 cm -1   Elbow 26 cm 25 cm 1 cm 24.5 cm -0.5   W+ 8" 26 cm 25.5 cm 0.5 cm 25.5 cm  No chg   W +  6" 23 cm 24.5 cm 1.5 cm 24.5 cm No chg   W + 4" 19.5 cm 20 cm 0.5 cm 20.5 cm +0.5   Wrist 15.5 cm 16.5 cm 1 cm 16 cm -0.5   DPC 18.5 cm 18.5 cm 0.5 cm 18.5 cm No chg   IP Thumb 6 cm 6 cm 0 cm 6.5 cm +0.5        Treatment     Alisha received therapeutic exercises to develop ROM for 8 minutes including:     Pulleys, 2 min each of flexion and scaption  Shoulder flexion at wall  Shoulder ABD at [facility] received the following manual therapy techniques: Manual Lymphatic Drainage were applied to the: trunk and LUE for 32 minutes.     Measurements taken above    Supine:  Short Neck   Shoulder Collectors  Diaphragmatic breathing with effleurage x 3  Clear healthy Lymph Nodes: Right Axillary   Open anastomoses: Anterior Axillo-Axillary (AAA)- 3 steps  Clear healthy lymph nodes: left Inguinal (involved side)  Open anastomoses: Axillo-Inguinal (AI)- 3 steps  Rework: AAA       Activate bulk flow:   Effleurage  Hand washing  Lateral arm                   Rework AAA, axillary lymph nodes, inguinal nodes, and lateral arm  Diaphragmatic breathing with effleurage x 3    Final effleurage      Pt participates in self-care/home management for 15 minutes:      Based on measurements above, recommending sigvaris secure armsleeve, 20-30mmHg, Size M1, Color Cocoa    Size Small Sigvaris secure glove, 20-30mmHg,    Discussed process for getting compression garments    Home Exercise Program and Patient Education   Education provided re:  - role of PT in multi - disciplinary team, goals for PT  - progress towards goals   - role of MLD, process for getting compression garments    Written Home Exercises Provided: Patient instructed to cont prior HEP.  Exercises " were reviewed and Alisha was able to demonstrate them prior to the end of the session.  Alisha demonstrated good  understanding of the education provided.     See EMR under Patient Instructions for exercises provided prior visit.    Pt has no cultural, educational or language barriers to learning provided.    Assessment     Patient tolerated session well. Session focused on making recommendation for compression garments. Initiated Manual lymph drainage, which she tolerated without issue in clinic. Will gauge response at next session and progress as tolerated.    Pt prognosis is Good. Pt will continue to benefit from skilled outpatient physical therapy to address the deficits listed in the problem list chart on initial evaluation, provide pt/family education and to maximize pt's level of independence in the home and community environment.     Anticipated barriers to physical therapy: intermittent use of compression    Goals as follows:  Short Term Goals (6 weeks)  1. Pt will demo decrease in girth in left upper extremity by >/= 1cm to allow for improved UE symmetry, clothing choice, ROM, and UE function. (progressing, not met)  2. Patient will demonstrate 100% knowledge of lymphedema precautions and signs of infection to allow for reduced risk of lymphedema, reduced risk of infection, and/or exacerbation.  (progressing, not met)  3. Patient will perform self lymph drainage techniques to enhance lymphatic drainage and mobility.  (progressing, not met)  4. Patient will tolerate daily activities with multilayered bandaging to enhance lymphatic drainage and skin elasticity.  (progressing, not met)  5. Pt will tolerate HEP for improved strength, functional mobility, ROM, posture, and endurance. (progressing, not met)  6. Pt will demo self-bandaging technique with Mod I assist to facilitate carryover with compression between therapy session and to maximize outcomes (progressing, not met)  7. Pt will increase AROM/PROM in  shoulder abduction ROM to >/=130 degrees on left to improve functional reach, carry, push, pull pain free. (progressing, not met)  8. Pt will increase AROM/PROM in shoulder flexion to >/=140 degrees on left to improve functional reach, carry, push, pull pain free.(progressing, not met)        Long Term Goals: ( 12  weeks)  1. Patient to show decreased girth in left upper extremity by >/= 2cm to allow for improved UE symmetry, clothing choice, ROM, and UE function.  (progressing, not met)  2. Patient to shola/doff compression garment with daily compliance to support lymphatic mobility and skin elasticity.  (progressing, not met)  3. Pt to show improved postural awareness and alignment for improved functional mobility.  (progressing, not met)  4. Pt to be independent and compliant with HEP to allow for improved ROM, reach and carry with functional endurance and strength.    (progressing, not met)  5. Pt will increase AROM/PROM in shoulder abduction ROM to >/=170 degrees on left to improve functional reach, carry, push, pull pain free. (progressing, not met)  6. Pt will increase AROM/PROM in shoulder flexion to >/=170 degrees bilaterally to improve functional reach, carry, push, pull pain free.(progressing, not met)     Plan   Outpatient physical therapy 2x week for 12 weeks to include the following:   Manual Therapy, Neuromuscular Re-ed, Orthotic Management and Training, Patient Education, Self Care, Therapeutic Activities, and Therapeutic Exercise. complete decongestive therapy        Plan of care Certification Period: 10/4/2024 to 12/6/24    Therapist: Marialuisa Schuler, PT  10/16/2024

## 2024-10-21 ENCOUNTER — CLINICAL SUPPORT (OUTPATIENT)
Dept: REHABILITATION | Facility: HOSPITAL | Age: 34
End: 2024-10-21
Payer: COMMERCIAL

## 2024-10-21 DIAGNOSIS — I97.2 POST-MASTECTOMY LYMPHEDEMA SYNDROME: ICD-10-CM

## 2024-10-21 DIAGNOSIS — Z74.09 IMPAIRED FUNCTIONAL MOBILITY AND ACTIVITY TOLERANCE: ICD-10-CM

## 2024-10-21 DIAGNOSIS — M25.612 DECREASED ROM OF LEFT SHOULDER: ICD-10-CM

## 2024-10-21 DIAGNOSIS — I89.0 LYMPHEDEMA OF LEFT ARM: Primary | ICD-10-CM

## 2024-10-21 PROCEDURE — 97535 SELF CARE MNGMENT TRAINING: CPT

## 2024-10-21 PROCEDURE — 97140 MANUAL THERAPY 1/> REGIONS: CPT

## 2024-10-21 NOTE — PROGRESS NOTES
Physical Therapy Daily Treatment Note       Date: 10/21/2024   Name: Alisha Cardenas  Clinic Number: [id number]    Therapy Diagnosis:   Encounter Diagnoses   Name Primary?    Lymphedema of left arm Yes    Impaired functional mobility and activity tolerance     Post-mastectomy lymphedema syndrome     Decreased ROM of left shoulder      Physician: Mariela Tan NP    Physician Orders: PT Eval and Treat - Lymphedema  Medical Diagnosis from Referral: Lymphedema of left arm [I89.0], Carcinoma of upper-inner quadrant of left breast in female, estrogen receptor negative [C50.212, Z17.1], H/O left mastectomy   Evaluation Date: 10/4/2024  Authorization Period Expiration: 12/31/24  Plan of Care Expiration: 12/6/24  Progress Note Due: 11/1/24  Visit # / Visits authorized: 3/20 (plus evaluation)   FOTO: 1/3    Precautions: Standard and cancer Can't Lift anything over 10lbs for 1 more month (11/20/24)      Time In: 1:42 PM  Time Out: 2:40 PM  Total Appointment Time (timed & untimed codes): 58 minutes      Subjective     Pt reports: Feels swollen today. States it's been more swollen since this weekend when she was at a homecoming game. She ordered a glove, but it hasn't arrived yet. She has been wearing her Edema Wear off and on.   She was partially compliant with home exercise program.  Response to previous treatment: no adverse reaction  Pain Scale: Alisha rates pain on a scale of 0/10 on VAS.   Pain location: N/A     Fatigue: none stated  Functional change: unsure  Treatment:    Chemotherapy: none  Radiation: none  Endocrine therapy: none  Surgery date: Pt with history of left triple negative breast cancer. She is s/p L TM with TE and ALND on 2/21/24 and s/p LETY reconstruction on 4/1/24. Pt is now s/p mastopexy to improve symmetry of right breat on 9/20/24.       Objective     Objective Measures updated at progress report unless specified.       LANDMARK RIGHT UE  "LEFT UE DIFF LUE Diff LUE  Diff   Date Eval Eval Eval 10/16/24 10/16/24 10/21/24 10/21/24   E + 8" 38 cm 37.5 cm 0.5 cm 36.5 cm -1 36.5 cm No chg   E + 6" 37 cm 36 cm 1 cm 36 cm No chg 36 cm No chg   E + 4" 34 cm 33.5 cm 0.5 cm 33 cm -0.5 33.5 cm +0.5   E + 2" 31 cm 32 cm 1 cm 31 cm -1 30 cm -1   Elbow 26 cm 25 cm 1 cm 24.5 cm -0.5 24.5 cm No chg   W+ 8" 26 cm 25.5 cm 0.5 cm 25.5 cm  No chg 25 cm -0.5   W +  6" 23 cm 24.5 cm 1.5 cm 24.5 cm No chg 24.5 cm No chg   W + 4" 19.5 cm 20 cm 0.5 cm 20.5 cm +0.5 20 cm -0.5   Wrist 15.5 cm 16.5 cm 1 cm 16 cm -0.5 16.5 cm +0.5   DPC 18.5 cm 18.5 cm 0.5 cm 18.5 cm No chg 19 cm +0.5   IP Thumb 6 cm 6 cm 0 cm 6.5 cm +0.5 6 cm -0.5        Treatment       Alisha received the following manual therapy techniques: Manual Lymphatic Drainage were applied to the: trunk and LUE for 40 minutes.     Measurements taken above    Supine:  Short Neck   Shoulder Collectors  Diaphragmatic breathing with effleurage x 3  Clear healthy Lymph Nodes: Right Axillary   Open anastomoses: Anterior Axillo-Axillary (AAA)- 3 steps  Clear healthy lymph nodes: left Inguinal (involved side)  Open anastomoses: Axillo-Inguinal (AI)- 3 steps  Rework: AAA and shoulder collectors  Open Posterior Axillo-Axillary (GERDA) anastomoses- 3 steps -not performed today     Activate bulk flow:   Effleurage  Hand washing  Lateral arm  Rework: AI, shoulder collectors             Rework AAA, axillary lymph nodes, and lateral arm  Vaso-vasorum  Special Technique at upper arm  Elbow- 3 hand placements  Forearm- Dorsal and ventral side  Hand- dorsum   Rework Arm  Rework: AAA, Axillary lymph nodes, AI, inguinal lymph nodes  Diaphragmatic breathing with effleurage x 3    Final effleurage    PT encouraged pt to don Edema Wear at home/work    Pt participates in self-care/home management for 13 minutes:    Reviewed process for getting compression garments    Taught first few steps of self MLD- pt gianna's partial understanding, but states " she needs more of a visual for learning. Pt provided with handout with steps and one with photos to reference        Home Exercise Program and Patient Education   Education provided re:  - role of PT in multi - disciplinary team, goals for PT  - progress towards goals   - Self MLD    Written Home Exercises Provided: yes.  Exercises were reviewed and Alisha was able to demonstrate them prior to the end of the session.  Alisha demonstrated good  understanding of the education provided.     See EMR under Media for exercises provided  10/21/24 .    Pt has no cultural, educational or language barriers to learning provided.    Assessment     Pt has stage 1 lymphedema of left upper extremity secondary to breast cancer and is in the maintenance (phase 2) stage of treatment. A compression garment is required to maintain limb girth and prevent progression of lymphedema to prevent infections, wounds, pain, and orthopedic issues.  Pt with some minor increases in her hand today. She tolerated manual lymph drainage without issue in clinic. Initiated self MLD today- pt demo's understanding, but she may need review with additional visuals. Will gauge response at next session and progress as tolerated.    Pt prognosis is Good. Pt will continue to benefit from skilled outpatient physical therapy to address the deficits listed in the problem list chart on initial evaluation, provide pt/family education and to maximize pt's level of independence in the home and community environment.     Anticipated barriers to physical therapy:  intermittent use of compression     Goals as follows:  Short Term Goals (6 weeks)  1. Pt will demo decrease in girth in left upper extremity by >/= 1cm to allow for improved UE symmetry, clothing choice, ROM, and UE function. (progressing, not met)  2. Patient will demonstrate 100% knowledge of lymphedema precautions and signs of infection to allow for reduced risk of lymphedema, reduced risk of infection,  and/or exacerbation.  (progressing, not met)  3. Patient will perform self lymph drainage techniques to enhance lymphatic drainage and mobility.  (progressing, not met)  4. Patient will tolerate daily activities with multilayered bandaging to enhance lymphatic drainage and skin elasticity.  (progressing, not met)  5. Pt will tolerate HEP for improved strength, functional mobility, ROM, posture, and endurance. (progressing, not met)  6. Pt will demo self-bandaging technique with Mod I assist to facilitate carryover with compression between therapy session and to maximize outcomes (progressing, not met)  7. Pt will increase AROM/PROM in shoulder abduction ROM to >/=130 degrees on left to improve functional reach, carry, push, pull pain free. (progressing, not met)  8. Pt will increase AROM/PROM in shoulder flexion to >/=140 degrees on left to improve functional reach, carry, push, pull pain free.(progressing, not met)        Long Term Goals: ( 12  weeks)  1. Patient to show decreased girth in left upper extremity by >/= 2cm to allow for improved UE symmetry, clothing choice, ROM, and UE function.  (progressing, not met)  2. Patient to shola/doff compression garment with daily compliance to support lymphatic mobility and skin elasticity.  (progressing, not met)  3. Pt to show improved postural awareness and alignment for improved functional mobility.  (progressing, not met)  4. Pt to be independent and compliant with HEP to allow for improved ROM, reach and carry with functional endurance and strength.    (progressing, not met)  5. Pt will increase AROM/PROM in shoulder abduction ROM to >/=170 degrees on left to improve functional reach, carry, push, pull pain free. (progressing, not met)  6. Pt will increase AROM/PROM in shoulder flexion to >/=170 degrees bilaterally to improve functional reach, carry, push, pull pain free.(progressing, not met)     Plan   Outpatient physical therapy 2x week for 12 weeks to include the  following:   Manual Therapy, Neuromuscular Re-ed, Orthotic Management and Training, Patient Education, Self Care, Therapeutic Activities, and Therapeutic Exercise. complete decongestive therapy        Plan of care Certification Period: 10/4/2024 to 12/6/24    Therapist: Marialuisa Schuler, PT  10/21/2024

## 2024-10-22 DIAGNOSIS — I89.0 LYMPHEDEMA: Primary | ICD-10-CM

## 2024-10-30 ENCOUNTER — CLINICAL SUPPORT (OUTPATIENT)
Dept: REHABILITATION | Facility: HOSPITAL | Age: 34
End: 2024-10-30
Payer: COMMERCIAL

## 2024-10-30 DIAGNOSIS — M25.612 DECREASED ROM OF LEFT SHOULDER: ICD-10-CM

## 2024-10-30 DIAGNOSIS — I89.0 LYMPHEDEMA OF LEFT ARM: Primary | ICD-10-CM

## 2024-10-30 DIAGNOSIS — Z74.09 IMPAIRED FUNCTIONAL MOBILITY AND ACTIVITY TOLERANCE: ICD-10-CM

## 2024-10-30 DIAGNOSIS — I97.2 POST-MASTECTOMY LYMPHEDEMA SYNDROME: ICD-10-CM

## 2024-10-30 PROCEDURE — 97535 SELF CARE MNGMENT TRAINING: CPT

## 2024-10-30 PROCEDURE — 97140 MANUAL THERAPY 1/> REGIONS: CPT

## 2024-11-05 ENCOUNTER — PATIENT MESSAGE (OUTPATIENT)
Dept: REHABILITATION | Facility: HOSPITAL | Age: 34
End: 2024-11-05
Payer: COMMERCIAL

## 2024-12-05 ENCOUNTER — HOSPITAL ENCOUNTER (OUTPATIENT)
Dept: RADIOLOGY | Facility: HOSPITAL | Age: 34
Discharge: HOME | End: 2024-12-05
Attending: STUDENT IN AN ORGANIZED HEALTH CARE EDUCATION/TRAINING PROGRAM
Payer: COMMERCIAL

## 2024-12-05 DIAGNOSIS — Z17.1 CARCINOMA OF UPPER-INNER QUADRANT OF LEFT BREAST IN FEMALE, ESTROGEN RECEPTOR NEGATIVE: ICD-10-CM

## 2024-12-05 DIAGNOSIS — C50.212 CARCINOMA OF UPPER-INNER QUADRANT OF LEFT BREAST IN FEMALE, ESTROGEN RECEPTOR NEGATIVE: ICD-10-CM

## 2024-12-05 PROCEDURE — 77049 MRI BREAST C-+ W/CAD BI: CPT | Mod: TC

## 2024-12-05 PROCEDURE — 25500020 PHARM REV CODE 255: Performed by: STUDENT IN AN ORGANIZED HEALTH CARE EDUCATION/TRAINING PROGRAM

## 2024-12-05 PROCEDURE — A9577 INJ MULTIHANCE: HCPCS | Performed by: STUDENT IN AN ORGANIZED HEALTH CARE EDUCATION/TRAINING PROGRAM

## 2024-12-05 PROCEDURE — 77049 MRI BREAST C-+ W/CAD BI: CPT | Mod: 26,,, | Performed by: RADIOLOGY

## 2024-12-05 RX ADMIN — GADOBENATE DIMEGLUMINE 17 ML: 529 INJECTION, SOLUTION INTRAVENOUS at 03:12

## 2024-12-09 ENCOUNTER — TELEPHONE (OUTPATIENT)
Dept: RADIOLOGY | Facility: HOSPITAL | Age: 34
End: 2024-12-09
Payer: COMMERCIAL

## 2024-12-09 ENCOUNTER — HOSPITAL ENCOUNTER (OUTPATIENT)
Dept: RADIOLOGY | Facility: HOSPITAL | Age: 34
Discharge: HOME | End: 2024-12-09
Attending: STUDENT IN AN ORGANIZED HEALTH CARE EDUCATION/TRAINING PROGRAM
Payer: COMMERCIAL

## 2024-12-09 DIAGNOSIS — R92.8 ABNORMAL FINDING ON BREAST IMAGING: ICD-10-CM

## 2024-12-09 PROCEDURE — 76642 ULTRASOUND BREAST LIMITED: CPT | Mod: TC,LT

## 2024-12-09 PROCEDURE — 76642 ULTRASOUND BREAST LIMITED: CPT | Mod: 26,LT,, | Performed by: RADIOLOGY

## 2024-12-09 PROCEDURE — 77065 DX MAMMO INCL CAD UNI: CPT | Mod: TC,LT

## 2024-12-09 PROCEDURE — 77061 BREAST TOMOSYNTHESIS UNI: CPT | Mod: 26,LT,, | Performed by: RADIOLOGY

## 2024-12-09 PROCEDURE — 77065 DX MAMMO INCL CAD UNI: CPT | Mod: 26,LT,, | Performed by: RADIOLOGY

## 2024-12-09 NOTE — TELEPHONE ENCOUNTER
----- Message from Cecy sent at 12/9/2024 10:47 AM CST -----  Regarding: Missed Call  Contact: [phone]  Type:  Patient Returning Call    Who Called:The pt   Who Left Message for Patient:Danny Jain LPN  Does the patient know what this is regarding?:Scheduling appt   Would the patient rather a call back or a response via [facility]? Call back   Best Call Back Number: [phone]  Additional Information: Thank you.

## 2025-01-06 DIAGNOSIS — Z17.1 CARCINOMA OF UPPER-INNER QUADRANT OF LEFT BREAST IN FEMALE, ESTROGEN RECEPTOR NEGATIVE: Primary | ICD-10-CM

## 2025-01-06 DIAGNOSIS — C50.212 CARCINOMA OF UPPER-INNER QUADRANT OF LEFT BREAST IN FEMALE, ESTROGEN RECEPTOR NEGATIVE: Primary | ICD-10-CM

## 2025-01-07 ENCOUNTER — OFFICE VISIT (OUTPATIENT)
Dept: PLASTIC SURGERY | Facility: CLINIC | Age: 35
End: 2025-01-07
Payer: COMMERCIAL

## 2025-01-07 VITALS
HEIGHT: 61 IN | SYSTOLIC BLOOD PRESSURE: 113 MMHG | DIASTOLIC BLOOD PRESSURE: 78 MMHG | BODY MASS INDEX: 32.1 KG/M2 | WEIGHT: 170 LBS | HEART RATE: 84 BPM

## 2025-01-07 DIAGNOSIS — Z17.1 CARCINOMA OF UPPER-INNER QUADRANT OF LEFT BREAST IN FEMALE, ESTROGEN RECEPTOR NEGATIVE: Primary | ICD-10-CM

## 2025-01-07 DIAGNOSIS — C50.212 CARCINOMA OF UPPER-INNER QUADRANT OF LEFT BREAST IN FEMALE, ESTROGEN RECEPTOR NEGATIVE: Primary | ICD-10-CM

## 2025-01-07 PROCEDURE — 99213 OFFICE O/P EST LOW 20 MIN: CPT | Mod: S$GLB,,, | Performed by: SURGERY

## 2025-01-07 PROCEDURE — 99999 PR PBB SHADOW E&M-EST. PATIENT-LVL III: CPT | Mod: PBBFAC,,, | Performed by: SURGERY

## 2025-01-07 NOTE — PROGRESS NOTES
Alisha Cardenas presents [facility] for a follow up visit status post left nipple sparing mastectomy with tissue expander placement 02/21/2024, delayed left LETY flap for 01/20/2024, and finally right mastopexy for symmetry with the left breast revision and fat grafting on 09/20/2024.  Overall she is happy with her appearance and improved symmetry.  She has noticed that the right areola has gotten a little bit enlarged.  She feels like she has the same size and closed.  She does have a little bit of widened thickened scar which she has a history of as well  She also discovered a small stitch underneath her right breast.   female denies fever, chills, nausea, vomiting or other systemic signs of infection.       ROS - negative, other than stated above    PHYSICAL EXAMINATION  Vitals:    01/07/25 0951   BP: 113/78   Pulse: 84       Gen: NAD, appears stated age  Neuro: normal without focal findings, mental status and speech normal  HEENT: NCAT, neck supple, PEERL  CV: RRR  Pulm: Breathing non-labored, chest wall movement equal bilaterally   Breast:  Left flap is soft.  She has a natural appearing breast.  No palpable areas of fat necrosis.  The left upper pole slightly more full than the right.  The right vertical mastopexy incision was well healed.  Areolas slightly enlarged.    She did have a single nylon suture laterally under the right breast which was removed  Abdomen: soft, nontender, no guarding  Donor site and umbilical incision well healed, slight upper abdominal fullness above the incision  Gu: genitalia not examined  Extremity:normal strength, no cyanosis or edema  Skin:  She does have some hypertrophic scars.  She has a previous staple keloid scar at the base of the neck.  This scars of the right breasts are healing well.  She does have hypertrophic fat grafting scar medially on the right.  Additionally the left lateral area of skin excision has a thickened mounded up scar  Psych: oriented  to time, place and person, mood and affect are within normal limits      ASSESSMENT/PLAN  34 y.o. female s/p left breast reconstruction with deep flap and mastopexy for symmetry    She is well healed.  She has a nice result and reasonable symmetry.    Spit a most of the time with a discussing scar care.  She has been putting a bike wheel on it.  She has not been doing any scar massage.  I recommended scar massage twice a day to all the areas of the hypertrophic scar.  I demonstrated to her how that was done.  Also discussed silicone taping to help with the scar care.  It is able to show her several products including the epidermis re usable silicone strips.  I recommend that she place those daily as well.  Discussed the potential for Kenalog injections if needed, though the can tend to widened out and lighten the color of the scar.  That may be an option of the scars do not improve with silicone tape and massage.    I will see her back in 3 months    All questions were answered. The patient was advised to contact the clinic with any questions or concerns prior to their next visit.       Keyshawn Street, DO  Plastic and Reconstructive Surgery      I spent a total of 20 minutes on the day of the visit.  This includes face to face time and non-face to face time preparing to see the patient (eg, review of tests), obtaining and/or reviewing separately obtained history, documenting clinical information in the electronic or other health record, independently interpreting results and communicating results to the patient/family/caregiver, or care coordinator.

## 2025-01-14 ENCOUNTER — OFFICE VISIT (OUTPATIENT)
Dept: HEMATOLOGY/ONCOLOGY | Facility: CLINIC | Age: 35
End: 2025-01-14
Payer: COMMERCIAL

## 2025-01-14 VITALS
HEART RATE: 91 BPM | TEMPERATURE: 98 F | SYSTOLIC BLOOD PRESSURE: 115 MMHG | BODY MASS INDEX: 33.28 KG/M2 | OXYGEN SATURATION: 100 % | DIASTOLIC BLOOD PRESSURE: 79 MMHG | HEIGHT: 61 IN | WEIGHT: 176.25 LBS | RESPIRATION RATE: 20 BRPM

## 2025-01-14 DIAGNOSIS — Z17.1 CARCINOMA OF UPPER-INNER QUADRANT OF LEFT BREAST IN FEMALE, ESTROGEN RECEPTOR NEGATIVE: Primary | ICD-10-CM

## 2025-01-14 DIAGNOSIS — I89.0 LYMPHEDEMA OF LEFT ARM: ICD-10-CM

## 2025-01-14 DIAGNOSIS — C50.212 CARCINOMA OF UPPER-INNER QUADRANT OF LEFT BREAST IN FEMALE, ESTROGEN RECEPTOR NEGATIVE: Primary | ICD-10-CM

## 2025-01-14 PROCEDURE — 99999 PR PBB SHADOW E&M-EST. PATIENT-LVL III: CPT | Mod: PBBFAC,,, | Performed by: STUDENT IN AN ORGANIZED HEALTH CARE EDUCATION/TRAINING PROGRAM

## 2025-01-14 PROCEDURE — G2211 COMPLEX E/M VISIT ADD ON: HCPCS | Mod: S$GLB,,, | Performed by: STUDENT IN AN ORGANIZED HEALTH CARE EDUCATION/TRAINING PROGRAM

## 2025-01-14 PROCEDURE — 99214 OFFICE O/P EST MOD 30 MIN: CPT | Mod: S$GLB,,, | Performed by: STUDENT IN AN ORGANIZED HEALTH CARE EDUCATION/TRAINING PROGRAM

## 2025-01-14 NOTE — PROGRESS NOTES
[facility]/ [facility]      Chief Complaint: TNBC      Cancer Staging   Carcinoma of upper-inner quadrant of left breast in female, estrogen receptor negative  Staging form: Breast, AJCC 8th Edition  - Clinical stage from 2024: Stage IIB (cT2, cN0, cM0, G3, ER-, NE-, HER2-) - Signed by Vannessa Barney MD on 2024      HPI:  Alisha Cardenas is a 34yo woman who presents today for evaluation of newly diagnosed breast cancer. Her oncologic history is as follows:    -Presented w palpable L breast mass that she first noticed around [facility]. Diagnostic MMG 23 reveals a 34 x 33 x 32mm in the L breast at 10oclock. Enlarged axillary LN measuring 18 x 15 x 14mm. No concerning Rt breast findings. Subsequent biopsy showing poorly differentiated invasive carcinoma, grade 3. ER negative, NE negative, Her2 0, Ki67 >90%. LN negative for metastatic carcinoma.   -MRI breast 1/10/23 revealed left breast upper inner mass measuring 4.7 cm craniocaudal x 4.6 cm AP x 4.0 cm   -s/p genetic testing that was negative for clinically significant mutation; VUS in CTNNA1  - 24 Underwent total left mastectomy. 5.5cm grade 3 IDC. Closest margin 2mm on posterior aspect. 0/17 LN.  -met with radiation oncology; decided to forego adjuvant radiation  -2024 underwent L breast reconstruction  -s/p Rt breast mastoplexy for symmetry    Denies significant pmh. FH notable for a cousin dx with breast cancer at 34yo and aunt with breast cancer. She lives in [place] with her 21yo niece. Works in childcare.     Gyn History:   Menarche: 8yo  Menopause: N/A LMP 2023     : No  HRT: No    Interval History:  Ms. Cardenas returns today for follow up. Doing well since her last visit. Was relieved that her breast imaging in December showed only fat necrosis. No other breast changes or concerns. She has maintained a good appetite and energy level.  "Denies new concerns today.        Patient Active Problem List   Diagnosis    Acute anemia    Viral syndrome    S/P abdominal myomectomy    Carcinoma of upper-inner quadrant of left breast in female, estrogen receptor negative    Lymphedema of left arm    Impaired functional mobility and activity tolerance    Post-mastectomy lymphedema syndrome    Decreased ROM of left shoulder       Current Outpatient Medications   Medication Instructions    albuterol sulfate (INV ALBUTEROL) 90 mcg inhalation 2 puffs, As needed (PRN)    ferrous sulfate (IRON ORAL) As needed (PRN)    gabapentin (NEURONTIN) 300 mg, Oral, 3 times daily    oxyCODONE (ROXICODONE) 5 mg, Oral, Every 4 hours PRN       Review of Systems:   Answers submitted by the patient for this visit:  Review of Systems Questionnaire (Submitted on 1/11/2025)  appetite change : No  unexpected weight change: No  mouth sores: No  visual disturbance: No  cough: No  shortness of breath: No  chest pain: No  abdominal pain: No  diarrhea: No  frequency: No  back pain: No  rash: No  headaches: No  adenopathy: No  nervous/ anxious: Yes      PHYSICAL EXAM:  /79 (BP Location: Right arm, Patient Position: Sitting)   Pulse 91   Temp 98.4 °F (36.9 °C) (Oral)   Resp 20   Ht 5' 1" (1.549 m)   Wt 79.9 kg (176 lb 4.1 oz)   SpO2 100%   BMI 33.30 kg/m²     ECOG 0  General: well appearing, in no apparent distress  HEENT: Normocephalic, EOMI, anicteric sclerae, MMM  Heart: RRR, well-perfused  Lungs: CTA bilaterally, no increased wob  Breast: s/p L breast mastectomy w reconstruction and Rt breast reduction with well-healed incisions, no appreciable masses or LAD  Abdomen: Soft, nontender, nondistended .  Extremities: No LE edema or joint effusion  Skin: warm, well-perfused, no rash  Neurologic: Alert and oriented x 4, normal speech and gait   Psychiatric: Conversing appropriately with providers throughout today's encounter.      Pertinent Labs & Imaging:  Reviewed all recent labs, " imaging and pathology.       Assessment & Plan:  Alisha is a can 35yo woman with recently diagnosed at least Stage IIB TNBC (cT2N0) who presents today for evaluation.    She was previously conseled regarding recommendations for her TNBC measuring 5.5cm, but she opted against chemotherapy and chose to undergo surgery upfront. Following her L mastectomy in February, she again opted to forego chemotherapy and radiation in favor of pursuing reconstruction sooner. She is now s/p L breast reconstruction w well-healed incisions. Previously discussed pursuing a high-risk screening protocol of mammogram and breast MRI annually alternating every 6 months. We counseled her extensively about possible symptoms of recurrence, including that the presence of symptoms can vary widely depending on location.     The patient reports she has met with radiation oncology and opted against radiation. We will continue to monitor clinically with mammogram and MRI yearly (Rt breast) and clinic visits every 3-4 months.     #TNBC:   --S/p flap reconstruction of the L breast on 4/1; more recently s/p Rt breast mastoplexy  --Will need yearly mammogram and breast MRI alternating every 6 months for high-risk screening protocol (MRI 12/2025, MMG in 6/2025  --surveillance q3-4mo for the first 5 years and annually thereafter     #L arm lymphedema:  --referral previously placed for PT eval       All questions were answered to her apparent satisfaction. Will see her back in 4 months or sooner should the need arise.        Route Chart for Scheduling    Med Onc Chart Routing      Follow up with physician 4 months.   Follow up with [facility]    Infusion scheduling note    Injection scheduling note    Labs    Imaging Mammogram   Rt breast mammo due in 6/2025   Pharmacy appointment    Other referrals                           Vannessa Barney MD     Total time of this visit, including time spent face to face with patient and/or via video/audio, and also in  preparing for today's visit for MDM and documentation. (Medical Decision Making, including consideration of possible diagnoses, management options, complex medical record review, review of diagnostic tests and information, consideration and discussion of significant complications based on comorbidities, and discussion with providers involved with the care of the patient) 30 minutes. Greater than 50% was spent face to face with the patient counseling and coordinating care.

## 2025-04-30 ENCOUNTER — OFFICE VISIT (OUTPATIENT)
Dept: OBSTETRICS AND GYNECOLOGY | Facility: CLINIC | Age: 35
End: 2025-04-30
Payer: COMMERCIAL

## 2025-04-30 VITALS
WEIGHT: 178.13 LBS | SYSTOLIC BLOOD PRESSURE: 112 MMHG | HEART RATE: 92 BPM | DIASTOLIC BLOOD PRESSURE: 75 MMHG | BODY MASS INDEX: 33.63 KG/M2 | HEIGHT: 61 IN

## 2025-04-30 DIAGNOSIS — Z76.89 ESTABLISHING CARE WITH NEW DOCTOR, ENCOUNTER FOR: ICD-10-CM

## 2025-04-30 DIAGNOSIS — Z01.419 WELL WOMAN EXAM WITH ROUTINE GYNECOLOGICAL EXAM: Primary | ICD-10-CM

## 2025-04-30 DIAGNOSIS — N92.0 INTERMENSTRUAL SPOTTING: ICD-10-CM

## 2025-04-30 DIAGNOSIS — Z11.3 SCREENING FOR STDS (SEXUALLY TRANSMITTED DISEASES): ICD-10-CM

## 2025-04-30 DIAGNOSIS — R35.0 URINARY FREQUENCY: ICD-10-CM

## 2025-04-30 DIAGNOSIS — Z12.4 SCREENING FOR CERVICAL CANCER: ICD-10-CM

## 2025-04-30 PROCEDURE — 81515 NFCT DS BV&VAGINITIS DNA ALG: CPT | Performed by: OBSTETRICS & GYNECOLOGY

## 2025-04-30 PROCEDURE — 87491 CHLMYD TRACH DNA AMP PROBE: CPT | Performed by: OBSTETRICS & GYNECOLOGY

## 2025-04-30 PROCEDURE — 99999 PR PBB SHADOW E&M-EST. PATIENT-LVL III: CPT | Mod: PBBFAC,,, | Performed by: OBSTETRICS & GYNECOLOGY

## 2025-04-30 PROCEDURE — 99395 PREV VISIT EST AGE 18-39: CPT | Mod: S$GLB,,, | Performed by: OBSTETRICS & GYNECOLOGY

## 2025-04-30 PROCEDURE — 87088 URINE BACTERIA CULTURE: CPT | Performed by: OBSTETRICS & GYNECOLOGY

## 2025-04-30 NOTE — PROGRESS NOTES
"Subjective     Patient ID: Alisha Cardenas is a 34 y.o. female.    Chief Complaint:  Annual Exam and STD CHECK      History of Present Illness  HPI  34 y.o.  presents for annual exam.  Overall doing well. Dx with breast cancer in Dec 2023, s/p left mastectomy.  Stopped birth control after diagnosis.  Cycles have been regular, monthly, until in March, she noticed spotting around 3/16, lasted 4 days, happened after having sex, then normal cycle on 3/27.  Resumed normal cycle in April as well.  No other complaints today.  She is sexually active, not actively trying to conceive at the moment, but does desire future fertility.  Also reports urinary frequency, no dysuria or incontinence.  No other complaints today.  H/o myomectomy in the past.  Previously heavy cycles, but much better after surgery.     GYN & OB History  Patient's last menstrual period was 2025.   Date of Last Pap: 2023    OB History    Para Term  AB Living   3    3    SAB IAB Ectopic Multiple Live Births             # Outcome Date GA Lbr Ruy/2nd Weight Sex Type Anes PTL Lv   3 AB            2 AB            1 AB                Review of Systems  Review of Systems   Constitutional:  Negative for chills and fever.   Respiratory:  Positive for shortness of breath (h/o asthma).    Cardiovascular:  Negative for chest pain.   Gastrointestinal:  Negative for abdominal pain, constipation and diarrhea.   Genitourinary:  Positive for frequency and menstrual problem (spotting). Negative for dyspareunia, dysuria, pelvic pain and vaginal discharge.   Musculoskeletal:  Negative for myalgias.   Neurological:  Negative for headaches.          Objective   Physical Exam    /75 (BP Location: Right arm, Patient Position: Sitting)   Pulse 92   Ht 5' 1" (1.549 m)   Wt 80.8 kg (178 lb 2.1 oz)   LMP 2025   BMI 33.66 kg/m²     Gen: NAD  Resp: Normal respiratory effort  Breast: Symmetric, nontender.  No masses.  No skin changes.  " No nipple discharge.   Abd: soft, NT  Pelvic: Normal-appearing external female genitalia.  No CMT.  Uterus small, mobile, nontender.  No adnexal masses or tenderness.    Ext: normal ROM  Psych: appropriate affect  Neuro: grossly intact         Assessment and Plan     Alisha was seen today for annual exam and std check.    Diagnoses and all orders for this visit:    Well woman exam with routine gynecological exam    Screening for STDs (sexually transmitted diseases)  -     C. trachomatis/N. gonorrhoeae by AMP DNA  -     HIV 1/2 Ag/Ab (4th Gen); Future  -     Treponema Pallidium Antibodies IgG, IgM; Future    Screening for cervical cancer  -     Liquid-Based Pap Smear, Screening    Intermenstrual spotting  -     Vaginosis Screen by DNA Probe  -     ESTRADIOL; Future  -     ANTIMULLERIAN HORMONE ([facility]); Future  -     TESTOSTERONE; Future  -     FOLLICLE STIMULATING HORMONE; Future  -     PROLACTIN; Future  -     US Pelvis Comp with Transvag NON-OB (xpd; Future    Urinary frequency  -     Urine Culture High Risk          Plan:  Routine annual exam with pap smear and breast exam today.  STD screening.  Discussed spotting - will get labs and US, but reassuring that she had normal April cycle.    Discussed non-hormonal birth control options if she does desire contraception.   Counseling done, precautions given, all questions answered.  RTC 1 year for annual, or prn.

## 2025-05-02 LAB
BACTERIA UR CULT: ABNORMAL
BACTERIA UR CULT: ABNORMAL
BACTERIAL VAGINOSIS DNA (OHS): DETECTED
C TRACH DNA SPEC QL NAA+PROBE: NOT DETECTED
CANDIDA GLABRATA/KRUSEI DNA (OHS): NOT DETECTED
CANDIDA SPECIES DNA (OHS): NOT DETECTED
CTGC SOURCE (OHS) ORD-325: NORMAL
N GONORRHOEA DNA UR QL NAA+PROBE: NOT DETECTED
TRICHOMONAS VAGINALIS DNA (OHS): NOT DETECTED

## 2025-05-05 ENCOUNTER — RESULTS FOLLOW-UP (OUTPATIENT)
Dept: OBSTETRICS AND GYNECOLOGY | Facility: CLINIC | Age: 35
End: 2025-05-05

## 2025-05-05 RX ORDER — METRONIDAZOLE 500 MG/1
500 TABLET ORAL EVERY 12 HOURS
Qty: 14 TABLET | Refills: 0 | Status: SHIPPED | OUTPATIENT
Start: 2025-05-05 | End: 2025-05-12

## 2025-05-05 RX ORDER — AMOXICILLIN 875 MG/1
875 TABLET, COATED ORAL EVERY 12 HOURS
Qty: 14 TABLET | Refills: 0 | Status: SHIPPED | OUTPATIENT
Start: 2025-05-05 | End: 2025-05-12

## 2025-05-07 ENCOUNTER — OFFICE VISIT (OUTPATIENT)
Dept: HEMATOLOGY/ONCOLOGY | Facility: CLINIC | Age: 35
End: 2025-05-07
Payer: COMMERCIAL

## 2025-05-07 VITALS
DIASTOLIC BLOOD PRESSURE: 81 MMHG | SYSTOLIC BLOOD PRESSURE: 118 MMHG | BODY MASS INDEX: 33.96 KG/M2 | WEIGHT: 179.88 LBS | TEMPERATURE: 98 F | OXYGEN SATURATION: 97 % | HEART RATE: 77 BPM | HEIGHT: 61 IN

## 2025-05-07 DIAGNOSIS — C50.212 CARCINOMA OF UPPER-INNER QUADRANT OF LEFT BREAST IN FEMALE, ESTROGEN RECEPTOR NEGATIVE: Primary | ICD-10-CM

## 2025-05-07 DIAGNOSIS — Z17.1 CARCINOMA OF UPPER-INNER QUADRANT OF LEFT BREAST IN FEMALE, ESTROGEN RECEPTOR NEGATIVE: Primary | ICD-10-CM

## 2025-05-07 PROCEDURE — 99999 PR PBB SHADOW E&M-EST. PATIENT-LVL III: CPT | Mod: PBBFAC,,, | Performed by: STUDENT IN AN ORGANIZED HEALTH CARE EDUCATION/TRAINING PROGRAM

## 2025-05-07 PROCEDURE — G2211 COMPLEX E/M VISIT ADD ON: HCPCS | Mod: S$GLB,,, | Performed by: STUDENT IN AN ORGANIZED HEALTH CARE EDUCATION/TRAINING PROGRAM

## 2025-05-07 PROCEDURE — 99214 OFFICE O/P EST MOD 30 MIN: CPT | Mod: S$GLB,,, | Performed by: STUDENT IN AN ORGANIZED HEALTH CARE EDUCATION/TRAINING PROGRAM

## 2025-05-07 NOTE — PROGRESS NOTES
[facility]/ [facility]      Chief Complaint: TNBC      Cancer Staging   Carcinoma of upper-inner quadrant of left breast in female, estrogen receptor negative  Staging form: Breast, AJCC 8th Edition  - Clinical stage from 2024: Stage IIB (cT2, cN0, cM0, G3, ER-, IL-, HER2-) - Signed by Vannessa Barney MD on 2024      HPI:  Alihsa Cardenas is a 32yo woman who presents today for evaluation of newly diagnosed breast cancer. Her oncologic history is as follows:    -Presented w palpable L breast mass that she first noticed around [facility]. Diagnostic MMG 23 reveals a 34 x 33 x 32mm in the L breast at 10oclock. Enlarged axillary LN measuring 18 x 15 x 14mm. No concerning Rt breast findings. Subsequent biopsy showing poorly differentiated invasive carcinoma, grade 3. ER negative, IL negative, Her2 0, Ki67 >90%. LN negative for metastatic carcinoma.   -MRI breast 1/10/23 revealed left breast upper inner mass measuring 4.7 cm craniocaudal x 4.6 cm AP x 4.0 cm   -s/p genetic testing that was negative for clinically significant mutation; VUS in CTNNA1  - 24 Underwent total left mastectomy. 5.5cm grade 3 IDC. Closest margin 2mm on posterior aspect. 0/17 LN.  -met with radiation oncology; decided to forego adjuvant radiation  -2024 underwent L breast reconstruction  -s/p Rt breast mastoplexy for symmetry    Denies significant pmh. FH notable for a cousin dx with breast cancer at 34yo and aunt with breast cancer. She lives in [place] with her 19yo niece. Works in childcare.     Gyn History:   Menarche: 8yo  Menopause: N/A LMP 2023     : No  HRT: No    Interval History:  Ms. Cardenas returns today for follow up. Doing fairly well since her last visit. Notes some concern about likely benign findings on breast imaging (MRI demonstrated post-op change and indeterminate enhancement noted in LOQ of the L breast;  "follow up MMG/US demonstrated area of fat necrosis in the retroareolar region w 6mo follow up recommended.) She maintained a good appetite and energy level. Following with PT for lymphedema. No new concerns today otherwise.         Patient Active Problem List   Diagnosis    Acute anemia    Viral syndrome    S/P abdominal myomectomy    Carcinoma of upper-inner quadrant of left breast in female, estrogen receptor negative    Lymphedema of left arm    Impaired functional mobility and activity tolerance    Post-mastectomy lymphedema syndrome    Decreased ROM of left shoulder       Current Outpatient Medications   Medication Instructions    albuterol sulfate (INV ALBUTEROL) 90 mcg inhalation 2 puffs, As needed (PRN)    amoxicillin (AMOXIL) 875 mg, Oral, Every 12 hours    ferrous sulfate (IRON ORAL) As needed (PRN)    metroNIDAZOLE (FLAGYL) 500 mg, Oral, Every 12 hours       Review of Systems:   Answers submitted by the patient for this visit:  Review of Systems Questionnaire (Submitted on 4/30/2025)  appetite change : No  unexpected weight change: No  mouth sores: No  visual disturbance: No  cough: No  shortness of breath: No  chest pain: No  abdominal pain: No  diarrhea: No  frequency: Yes  back pain: No  rash: No  headaches: No  adenopathy: No  nervous/ anxious: No        PHYSICAL EXAM:  /81 (BP Location: Right arm, Patient Position: Sitting)   Pulse 77   Temp 98.3 °F (36.8 °C) (Oral)   Ht 5' 1" (1.549 m)   Wt 81.6 kg (179 lb 14.3 oz)   LMP 04/24/2025   SpO2 97%   BMI 33.99 kg/m²     ECOG 0  General: well appearing, in no apparent distress  HEENT: Normocephalic, EOMI, anicteric sclerae, MMM  Heart: well-perfused  Lungs: no increased wob  Breast: s/p L breast mastectomy w reconstruction and Rt breast reduction with well-healed incisions, no appreciable masses or LAD  Abdomen: Soft, nontender, nondistended .  Extremities: No LE edema or joint effusion  Skin: warm, well-perfused, no rash  Neurologic: Alert " and oriented x 4, normal speech and gait   Psychiatric: Conversing appropriately with providers throughout today's encounter.      Pertinent Labs & Imaging:  Reviewed all recent labs, imaging and pathology.       Assessment & Plan:  Alisha is a can 33yo woman with recently diagnosed at least Stage IIB TNBC (cT2N0) who presents today for evaluation.    She was previously conseled regarding recommendations for her TNBC measuring 5.5cm, but she opted against chemotherapy and chose to undergo surgery upfront. Following her L mastectomy in February, she again opted to forego chemotherapy and radiation in favor of pursuing reconstruction sooner. She is now s/p L breast reconstruction w well-healed incisions. Previously discussed pursuing a high-risk screening protocol of mammogram and breast MRI annually alternating every 6 months. We counseled her extensively about possible symptoms of recurrence, including that the presence of symptoms can vary widely depending on location.     The patient reports she has met with radiation oncology and opted against radiation. We will continue to monitor w clinical surveillance. Proceeding with high risk imaging schedule, but pt would prefer to avoid MRI again if possible.         #TNBC:   --S/p flap reconstruction of the L breast on 4/1; more recently s/p Rt breast mastoplexy  --Previously discussed high risk screening protocol; pt requests to avoid future MRI today. Will get bilateral diagnostic MMG in June given likely benign findings in the L breast noted in December w closer interval imaging recommended. Moving forward will plan for screening Rt breast MMG annually.   --surveillance q3-4mo for the first 5 years and annually thereafter     #L arm lymphedema: stable   --referral previously placed for PT eval       All questions were answered to her apparent satisfaction. Will see her back in 4 months or sooner should the need arise.        Route Chart for Scheduling    Med Onc  Chart Routing      Follow up with physician    Follow up with [facility] 4 months.   Infusion scheduling note    Injection scheduling note    Labs    Imaging Mammogram   bilateral diagnostic MMG due in 6/2025   Pharmacy appointment    Other referrals                       Total time of this visit, including time spent face to face with patient and/or via video/audio, and also in preparing for today's visit for MDM and documentation. (Medical Decision Making, including consideration of possible diagnoses, management options, complex medical record review, review of diagnostic tests and information, consideration and discussion of significant complications based on comorbidities, and discussion with providers involved with the care of the patient) 30 minutes. Greater than 50% was spent face to face with the patient counseling and coordinating care.        Vannessa Barney MD     Total time of this visit, including time spent face to face with patient and/or via video/audio, and also in preparing for today's visit for MDM and documentation. (Medical Decision Making, including consideration of possible diagnoses, management options, complex medical record review, review of diagnostic tests and information, consideration and discussion of significant complications based on comorbidities, and discussion with providers involved with the care of the patient) 30 minutes. Greater than 50% was spent face to face with the patient counseling and coordinating care.

## 2025-05-08 ENCOUNTER — TELEPHONE (OUTPATIENT)
Dept: RADIOLOGY | Facility: HOSPITAL | Age: 35
End: 2025-05-08
Payer: COMMERCIAL

## 2025-05-09 ENCOUNTER — TELEPHONE (OUTPATIENT)
Dept: OBSTETRICS AND GYNECOLOGY | Facility: CLINIC | Age: 35
End: 2025-05-09
Payer: COMMERCIAL

## 2025-06-04 ENCOUNTER — HOSPITAL ENCOUNTER (OUTPATIENT)
Dept: RADIOLOGY | Facility: HOSPITAL | Age: 35
Discharge: HOME | End: 2025-06-04
Attending: STUDENT IN AN ORGANIZED HEALTH CARE EDUCATION/TRAINING PROGRAM
Payer: COMMERCIAL

## 2025-06-04 DIAGNOSIS — Z17.1 CARCINOMA OF UPPER-INNER QUADRANT OF LEFT BREAST IN FEMALE, ESTROGEN RECEPTOR NEGATIVE: ICD-10-CM

## 2025-06-04 DIAGNOSIS — C50.212 CARCINOMA OF UPPER-INNER QUADRANT OF LEFT BREAST IN FEMALE, ESTROGEN RECEPTOR NEGATIVE: ICD-10-CM

## 2025-06-04 PROCEDURE — 76642 ULTRASOUND BREAST LIMITED: CPT | Mod: TC,LT

## 2025-06-04 PROCEDURE — 76642 ULTRASOUND BREAST LIMITED: CPT | Mod: 26,LT,, | Performed by: RADIOLOGY

## 2025-06-04 PROCEDURE — 77065 DX MAMMO INCL CAD UNI: CPT | Mod: 26,RT,, | Performed by: RADIOLOGY

## 2025-06-04 PROCEDURE — 77061 BREAST TOMOSYNTHESIS UNI: CPT | Mod: TC,RT

## 2025-06-04 PROCEDURE — 77061 BREAST TOMOSYNTHESIS UNI: CPT | Mod: 26,RT,, | Performed by: RADIOLOGY

## 2025-06-11 ENCOUNTER — OFFICE VISIT (OUTPATIENT)
Dept: PULMONOLOGY | Facility: CLINIC | Age: 35
End: 2025-06-11
Payer: COMMERCIAL

## 2025-06-11 VITALS
SYSTOLIC BLOOD PRESSURE: 138 MMHG | HEART RATE: 98 BPM | DIASTOLIC BLOOD PRESSURE: 84 MMHG | WEIGHT: 177.69 LBS | BODY MASS INDEX: 33.55 KG/M2 | HEIGHT: 61 IN | OXYGEN SATURATION: 94 %

## 2025-06-11 DIAGNOSIS — Z76.89 ESTABLISHING CARE WITH NEW DOCTOR, ENCOUNTER FOR: ICD-10-CM

## 2025-06-11 DIAGNOSIS — J45.20 MILD INTERMITTENT ASTHMA WITHOUT COMPLICATION: Primary | ICD-10-CM

## 2025-06-11 PROCEDURE — 99204 OFFICE O/P NEW MOD 45 MIN: CPT | Mod: S$GLB,,, | Performed by: INTERNAL MEDICINE

## 2025-06-11 PROCEDURE — G2211 COMPLEX E/M VISIT ADD ON: HCPCS | Mod: S$GLB,,, | Performed by: INTERNAL MEDICINE

## 2025-06-11 PROCEDURE — 99999 PR PBB SHADOW E&M-EST. PATIENT-LVL III: CPT | Mod: PBBFAC,,, | Performed by: INTERNAL MEDICINE

## 2025-06-11 NOTE — PROGRESS NOTES
Subjective:       Patient ID: Alisha Cardenas is a 34 y.o. female.    Chief Complaint: Asthma    35 yo female with h/o childhood asthma diagnosed at age 6 with persistence through adulthood, breast cancer s/p surgery who presents to establish care for asthma. She has been using a Breyna inhaler as a rescue. She is using it about 2/week typically after known triggers: heat, working out, smoke, allergies. She has never been hospitalized for asthma. No recent exacerbations or steroid use.     Asthma  Her past medical history is significant for asthma.   History of Present Illness             Review of Systems      Past Medical History[1]     Family History   Problem Relation Name Age of Onset    No Known Problems Mother      No Known Problems Father      Vaginal cancer Neg Hx      Endometrial cancer Neg Hx      Cervical cancer Neg Hx        If not mentioned in HPI, Family history is reviewed and not contributory    Social History[2]     Objective:        Vitals:    06/11/25 1503   BP: 138/84   Pulse: 98     Wt Readings from Last 3 Encounters:   06/11/25 80.6 kg (177 lb 11.1 oz)   05/07/25 81.6 kg (179 lb 14.3 oz)   04/30/25 80.8 kg (178 lb 2.1 oz)     Temp Readings from Last 3 Encounters:   05/07/25 98.3 °F (36.8 °C) (Oral)   01/14/25 98.4 °F (36.9 °C) (Oral)   09/26/24 98 °F (36.7 °C) (Oral)     BP Readings from Last 3 Encounters:   06/11/25 138/84   05/07/25 118/81   04/30/25 112/75     Pulse Readings from Last 3 Encounters:   06/11/25 98   05/07/25 77   04/30/25 92       Physical Exam   Constitutional: She is oriented to person, place, and time. She appears well-developed and well-nourished.   HENT:   Head: Normocephalic.   Mouth/Throat: Oropharynx is clear and moist.   Cardiovascular: Normal rate, regular rhythm and normal heart sounds.   Pulmonary/Chest: Normal expansion, symmetric chest wall expansion, effort normal and breath sounds normal. She has no wheezes.   Abdominal: Soft. She exhibits no distension.    Musculoskeletal:         General: No edema. Normal range of motion.      Cervical back: Neck supple.   Lymphadenopathy:     She has no cervical adenopathy.   Neurological: She is alert and oriented to person, place, and time. Gait normal.   Skin: Skin is warm and dry. No rash noted.   Psychiatric: She has a normal mood and affect. Her behavior is normal. Judgment and thought content normal.   Vitals reviewed.    CBC  Lab Results   Component Value Date    WBC 11.98 04/02/2024    HGB 9.2 (L) 04/02/2024    HCT 29.7 (L) 04/02/2024    MCV 80 (L) 04/02/2024     04/02/2024         CMP  Sodium   Date Value Ref Range Status   01/12/2024 139 136 - 145 mmol/L Final     Potassium   Date Value Ref Range Status   01/12/2024 3.9 3.5 - 5.1 mmol/L Final     Chloride   Date Value Ref Range Status   01/12/2024 105 95 - 110 mmol/L Final     CO2   Date Value Ref Range Status   01/12/2024 24 23 - 29 mmol/L Final     Glucose   Date Value Ref Range Status   01/12/2024 96 70 - 110 mg/dL Final     BUN   Date Value Ref Range Status   01/12/2024 16 6 - 20 mg/dL Final     Creatinine   Date Value Ref Range Status   01/12/2024 0.8 0.5 - 1.4 mg/dL Final     Calcium   Date Value Ref Range Status   01/12/2024 9.3 8.7 - 10.5 mg/dL Final     Total Protein   Date Value Ref Range Status   01/12/2024 7.8 6.0 - 8.4 g/dL Final     Albumin   Date Value Ref Range Status   01/12/2024 3.7 3.5 - 5.2 g/dL Final     Total Bilirubin   Date Value Ref Range Status   01/12/2024 0.3 0.1 - 1.0 mg/dL Final     Comment:     For infants and newborns, interpretation of results should be based  on gestational age, weight and in agreement with clinical  observations.    Premature Infant recommended reference ranges:  Up to 24 hours.............<8.0 mg/dL  Up to 48 hours............<12.0 mg/dL  3-5 days..................<15.0 mg/dL  6-29 days.................<15.0 mg/dL       Alkaline Phosphatase   Date Value Ref Range Status   01/12/2024 61 55 - 135 U/L Final  "    AST   Date Value Ref Range Status   01/12/2024 15 10 - 40 U/L Final     ALT   Date Value Ref Range Status   01/12/2024 15 10 - 44 U/L Final     Anion Gap   Date Value Ref Range Status   01/12/2024 10 8 - 16 mmol/L Final     eGFR   Date Value Ref Range Status   01/12/2024 >60.0 >60 mL/min/1.73 m^2 Final       ABG  No results found for: "PH", "PO2", "PCO2"        Personal Diagnostic Review  I have personally reviewed the following data and added my own interpretation as below:  OB note reviewed. Oncology note reviewed      6/11/2025     3:03 PM 5/7/2025    11:07 AM 4/30/2025     3:42 PM 1/14/2025     1:08 PM 1/7/2025     9:51 AM 10/8/2024    10:30 AM 9/26/2024     2:58 PM   Pulmonary Function Tests   SpO2 94 % 97 %  100 %   100 %   Height 5' 1" (1.549 m) 5' 1" (1.549 m) 5' 1" (1.549 m) 5' 1" (1.549 m) 5' 1" (1.549 m) 5' 1" (1.549 m) 5' 1" (1.549 m)   Weight 80.6 kg (177 lb 11.1 oz) 81.6 kg (179 lb 14.3 oz) 80.8 kg (178 lb 2.1 oz) 79.9 kg (176 lb 4.1 oz) 77.1 kg (170 lb)  79.4 kg (175 lb)   BMI (Calculated) 33.6 34 33.7 33.3 32.1  33.1         Assessment:       1. Mild intermittent asthma without complication    2. Establishing care with new doctor, encounter for        Encounter Medications[3]  1. Establishing care with new doctor, encounter for  - Ambulatory referral/consult to Pulmonology    2. Mild intermittent asthma without complication  - albuterol-budesonide (AIRSUPRA) 90-80 mcg/actuation; Inhale 2 puffs into the lungs every 4 (four) hours as needed (Wheezing, cough).  Dispense: 10.7 g; Refill: 11    Plan:     Problem List Items Addressed This Visit          Pulmonary    Mild intermittent asthma without complication - Primary    Current Assessment & Plan   Significant chronic condition to be followed longitudinally with following long term treatment plan.    Has been maintained on Breyna for guidelines based treatment. Discussed that this medication is not FDA approved as rescue therapy even though commonly " used in [place] for this. Discussed switching to Airsupra per guidelines and FDA approved medication.   -discussed alternative FDA approved medication is albuterol but not following current guidelines.  -discussed asthma trajectory. Discussed asthma in pregnancy  -went over inhaler technique         Relevant Medications    albuterol-budesonide (AIRSUPRA) 90-80 mcg/actuation     Other Visit Diagnoses         Establishing care with new doctor, encounter for                Assessment & Plan               Please note Overview Notes are historic documentation. Please review A/P for current updates.  No follow-ups on file.      Future Appointments   Date Time Provider Department Center   9/10/2025  9:30 AM Shakira Ham NP [facility] [id number] Luis Miguel Sands MD           [1]   Past Medical History:  Diagnosis Date    Asthma     reports as a child    Astigmatism of left eye     Breast cancer     Left    Complete spontaneous  2014 9:22:39 AM    [facility] - [facility]: , spontaneous, complete-No Additional Notes    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 2014 9:22:57 AM    [facility] - [facility]: , habitual w/o pregnancy-No Additional Notes    Missed  2014 9:22:49 AM    [facility] - [facility]: , Missed, Less than 22 Weeks-No Additional Notes    Prior miscarriage with pregnancy in first trimester, antepartum     Prior miscarriage with pregnancy in first trimester, antepartum     Prior miscarriage with pregnancy in second trimester, antepartum    [2]   Social History  Tobacco Use    Smoking status: Never    Smokeless tobacco: Never   Substance Use Topics    Alcohol use: Yes     Comment: weekends    Drug use: No   [3]   Outpatient Encounter Medications as of 2025   Medication Sig Dispense Refill    albuterol sulfate (INV ALBUTEROL) 90 mcg inhalation Inhale 2 puffs into the lungs as needed (PRN wheeze). 2 puffs  inhaled PRN      ferrous sulfate (IRON ORAL) Take by mouth as needed.      albuterol-budesonide (AIRSUPRA) 90-80 mcg/actuation Inhale 2 puffs into the lungs every 4 (four) hours as needed (Wheezing, cough). 10.7 g 11     No facility-administered encounter medications on file as of 6/11/2025.      20

## 2025-06-11 NOTE — ASSESSMENT & PLAN NOTE
Significant chronic condition to be followed longitudinally with following long term treatment plan.    Has been maintained on Breyna for guidelines based treatment. Discussed that this medication is not FDA approved as rescue therapy even though commonly used in [place] for this. Discussed switching to Airsupra per guidelines and FDA approved medication.   -discussed alternative FDA approved medication is albuterol but not following current guidelines.  -discussed asthma trajectory. Discussed asthma in pregnancy  -went over inhaler technique

## (undated) DEVICE — SUT VICRYL PLUS 4-0 PS2 27

## (undated) DEVICE — SUT MONOCRYL 3-0 PS-2 UND

## (undated) DEVICE — NDL ECLIPSE SAFETY 23G 1.5IN

## (undated) DEVICE — GLOVE SENSICARE PI ALOE 6.5

## (undated) DEVICE — DRAIN CHANNEL ROUND 15FR

## (undated) DEVICE — BLANKET HYPER ADULT 24X60IN

## (undated) DEVICE — ADHESIVE DERMABOND ADVANCED

## (undated) DEVICE — HOLDER DRAIN POUCH PINK

## (undated) DEVICE — APPLICATOR CHLORAPREP ORN 26ML

## (undated) DEVICE — SUT PROLENE 0 CT1 30IN BLUE

## (undated) DEVICE — CONTAINER SPEC OR STRL 4.5OZ

## (undated) DEVICE — BLADE SURG CARBON STEEL SZ11

## (undated) DEVICE — SOL PVP-I SCRUB 7.5% 4OZ

## (undated) DEVICE — SUT 0 VLOC GR GS-21

## (undated) DEVICE — BANDAGE ROLL COTTN 4.5INX4.1YD

## (undated) DEVICE — POSITIONER IV ARMBOARD FOAM

## (undated) DEVICE — DRESSING XEROFORM NONADH 1X8IN

## (undated) DEVICE — SUT MONOCYRL 4-0 PS2 UND

## (undated) DEVICE — TOWEL OR XRAY BLUE 17X26IN

## (undated) DEVICE — CATH IV CATHLON W/HUB18GAX

## (undated) DEVICE — SUT VICRYL 4-0 27 RB-1

## (undated) DEVICE — STAPLER SKIN ROTATING HEAD

## (undated) DEVICE — SUT 5/0 18IN PLAIN GUT D/A

## (undated) DEVICE — SUT PDS II 2-0 CT1

## (undated) DEVICE — SPONGE LAP 18X18 PREWASHED

## (undated) DEVICE — NDL HYPO REG 25G X 1 1/2

## (undated) DEVICE — SOL IRRI STRL WATER 1000ML

## (undated) DEVICE — MARKER FN REG DUAL UTIL RULER

## (undated) DEVICE — SYS CLSR DERMABOND PRINEO 22CM

## (undated) DEVICE — DRAPE STERI INSTRUMENT 1018

## (undated) DEVICE — GOWN POLY REINF SET SLV XL

## (undated) DEVICE — SUT VICRYL 2-0 36 CT-1

## (undated) DEVICE — BANDAGE GAUZE COT STRL 4.5X4.1

## (undated) DEVICE — GLOVE SENSICARE PI GRN 7

## (undated) DEVICE — SUT STRATAFIX PGAPCL 3 FS-1

## (undated) DEVICE — ELECTRODE REM PLYHSV RETURN 9

## (undated) DEVICE — NDL SAFETY 22G X 1.5 ECLIPSE

## (undated) DEVICE — DRAPE OPMI STERILE

## (undated) DEVICE — SUT 3-0 ETHILON 18 FS-1

## (undated) DEVICE — SYR 10CC LUER LOCK

## (undated) DEVICE — GLOVE BIOGEL SKINSENSE PI 7.0

## (undated) DEVICE — SUT MCRYL PLUS 4-0 PS2 27IN

## (undated) DEVICE — SUT VICRYL PLUS 2-0 CT1 18

## (undated) DEVICE — TUBING INFILTRATION STRL DISP

## (undated) DEVICE — SUT VICRYL+ 2-0 UR6 27 VIOL

## (undated) DEVICE — DRAPE THREE-QTR REINF 53X77IN

## (undated) DEVICE — ELECTRODE BLADE INSULATED 1 IN

## (undated) DEVICE — GLOVE BIOGEL SKINSENSE PI 6.5

## (undated) DEVICE — APPLIER CLIP LIAGCLIP 9.375IN

## (undated) DEVICE — Device

## (undated) DEVICE — SOL NORMAL USPCA 0.9%

## (undated) DEVICE — SUT 5/0 18IN PLIABILIZED ET

## (undated) DEVICE — PROBE FLOW DOPPLER

## (undated) DEVICE — GOWN SMART IMP BREATHABLE XXLG

## (undated) DEVICE — TRAY DRY SKIN SCRUB PREP

## (undated) DEVICE — SYS REVOLVE FAT PROCESSING

## (undated) DEVICE — SYR ONLY LUER LOCK 20CC

## (undated) DEVICE — CLIP DOUBLE MICRO.

## (undated) DEVICE — DRESSING ABSRBNT ISLAND 3.6X8

## (undated) DEVICE — EVACUATOR WOUND BULB 100CC

## (undated) DEVICE — SUT STRATAFIX PGAPCL 4 FS-2

## (undated) DEVICE — SUT 2/0 30IN SILK BLK BRAI

## (undated) DEVICE — BLADE PEAK PLASMA

## (undated) DEVICE — NDL SPINAL 20GX3.5 HUB

## (undated) DEVICE — SPONGE BULKEE II ABSRB 6X6.75

## (undated) DEVICE — PAD ABDOMINAL STERILE 8X10IN

## (undated) DEVICE — BRA POST SURGICAL WHT 44-46IN

## (undated) DEVICE — MARKER SKIN STND TIP BLUE BARR

## (undated) DEVICE — SUT 0 VICRYL PLUS CT-1 27IN

## (undated) DEVICE — CONTAINER SPECIMEN OR STER 4OZ

## (undated) DEVICE — TOWEL OR DISP STRL BLUE 4/PK

## (undated) DEVICE — PENCIL ELECTROSURG HOLST W/BLD

## (undated) DEVICE — CLAMP SINGLE MICRO.

## (undated) DEVICE — POSITIONER HEEL FOAM CONVOLTD

## (undated) DEVICE — GOWN NONREINF SET-IN SLV XL

## (undated) DEVICE — APPLIER LIGACLIP SM 9.38IN

## (undated) DEVICE — TIP YANKAUERS BULB NO VENT

## (undated) DEVICE — BANDAGE BULKEE II 2.25INX3YD

## (undated) DEVICE — TOWEL OR NONABSORB ADH 17X26

## (undated) DEVICE — SUT STRATAFIX PGAPCL 3 FS-2

## (undated) DEVICE — ELECTRODE BLD EXT INSUL 1

## (undated) DEVICE — SUT STRATAFIX PDS 2 CT-1 14IN

## (undated) DEVICE — SUT VICRYL 3-0 27 SH

## (undated) DEVICE — GLOVE SENSICARE PI ALOE 7

## (undated) DEVICE — APPLIER LIGACLIP MED 11IN

## (undated) DEVICE — SOL BETADINE 5%

## (undated) DEVICE — DRESSING CHG FOAM 4MM 1IN

## (undated) DEVICE — BOVIE SUCTION

## (undated) DEVICE — COVER MAYO STAND REINFRCD 30

## (undated) DEVICE — DRESSING TEGADERM CHG 3.5X4.5

## (undated) DEVICE — GLOVE SENSICARE PI SURG 7.5

## (undated) DEVICE — GOWN AERO CHROME W/ TOWEL XL

## (undated) DEVICE — SPONGE PATTY SURGICAL .5X3IN

## (undated) DEVICE — SOL VASHE INSTILLATION WND 16

## (undated) DEVICE — KIT URINE METER 350ML STAT LOC

## (undated) DEVICE — TUBE PAL ASPIR CONN STRL 12FT

## (undated) DEVICE — BLADE SURG #15 CARBON STEEL

## (undated) DEVICE — STOCKINETTE TUBULAR 2PL 6 X 4

## (undated) DEVICE — BANDAGE CONFORM STRTCH 2X5 ST

## (undated) DEVICE — GOWN ECLIPSE REINF LVL4 TWL LG

## (undated) DEVICE — SYS CLSR DERMABOND PRINEO 42CM

## (undated) DEVICE — SUT 2/0 27IN PDS II VIO MO

## (undated) DEVICE — POSITIONER HEAD DONUT 9IN FOAM

## (undated) DEVICE — CLOSURE SKIN STERI STRIP 1/2X4

## (undated) DEVICE — DRESSING TRANS 4X10 TEGADERM

## (undated) DEVICE — CLIPPER BLADE MOD 4406 (CAREF)

## (undated) DEVICE — GEL AQUASONIC 100 STERILE20GM

## (undated) DEVICE — CABLE EXT DOPPLER FLOW PROBE

## (undated) DEVICE — SYR 50ML CATH TIP

## (undated) DEVICE — SUT VICRYL PLUS 0 CT1 36IN

## (undated) DEVICE — WAVEGUIDE BRITEFIELD DISP

## (undated) DEVICE — SEE MEDLINE ITEM 152529

## (undated) DEVICE — SUT 9/0 5IN ETHILON BLK MON

## (undated) DEVICE — GLOVE SENSICARE PI GRN 6.5

## (undated) DEVICE — TRAY CATH 1-LYR URIMTR 16FR

## (undated) DEVICE — UNDERGLOVES BIOGEL PI SZ 7 LF

## (undated) DEVICE — DRAPE CORETEMP FLD WRM 56X62IN

## (undated) DEVICE — BLADE SURG CARBON STEEL #10

## (undated) DEVICE — MANIFOLD 4 PORT

## (undated) DEVICE — SUT ETHILON 2-0 FS 18IN BLK

## (undated) DEVICE — DRAIN PENROSE XRAY 18 X 3/4 ST

## (undated) DEVICE — PAD ABD 8X10 STERILE

## (undated) DEVICE — SPEARS EYE 10/PK

## (undated) DEVICE — SUT PROLENE 4-0 MONO 18IN